# Patient Record
Sex: MALE | Race: WHITE | NOT HISPANIC OR LATINO | Employment: OTHER | URBAN - METROPOLITAN AREA
[De-identification: names, ages, dates, MRNs, and addresses within clinical notes are randomized per-mention and may not be internally consistent; named-entity substitution may affect disease eponyms.]

---

## 2017-01-01 ENCOUNTER — ALLSCRIPTS OFFICE VISIT (OUTPATIENT)
Dept: OTHER | Facility: OTHER | Age: 57
End: 2017-01-01

## 2017-01-01 ENCOUNTER — APPOINTMENT (INPATIENT)
Dept: NON INVASIVE DIAGNOSTICS | Facility: HOSPITAL | Age: 57
DRG: 246 | End: 2017-01-01
Payer: MEDICARE

## 2017-01-01 ENCOUNTER — ANESTHESIA EVENT (OUTPATIENT)
Dept: PERIOP | Facility: HOSPITAL | Age: 57
End: 2017-01-01
Payer: MEDICARE

## 2017-01-01 ENCOUNTER — HOSPITAL ENCOUNTER (OUTPATIENT)
Dept: SLEEP CENTER | Facility: CLINIC | Age: 57
Discharge: HOME | End: 2017-06-22
Payer: MEDICARE

## 2017-01-01 ENCOUNTER — HOSPITAL ENCOUNTER (OUTPATIENT)
Dept: NON INVASIVE DIAGNOSTICS | Facility: HOSPITAL | Age: 57
Discharge: HOME | End: 2017-01-20
Payer: MEDICARE

## 2017-01-01 ENCOUNTER — GENERIC CONVERSION - ENCOUNTER (OUTPATIENT)
Dept: OTHER | Facility: OTHER | Age: 57
End: 2017-01-01

## 2017-01-01 ENCOUNTER — APPOINTMENT (OUTPATIENT)
Dept: WOUND CARE | Facility: HOSPITAL | Age: 57
End: 2017-01-01
Payer: MEDICARE

## 2017-01-01 ENCOUNTER — HOSPITAL ENCOUNTER (OUTPATIENT)
Dept: NON INVASIVE DIAGNOSTICS | Facility: HOSPITAL | Age: 57
Discharge: HOME | End: 2017-03-07
Attending: INTERNAL MEDICINE | Admitting: INTERNAL MEDICINE
Payer: MEDICARE

## 2017-01-01 ENCOUNTER — ANESTHESIA (OUTPATIENT)
Dept: PERIOP | Facility: HOSPITAL | Age: 57
End: 2017-01-01
Payer: MEDICARE

## 2017-01-01 ENCOUNTER — HOSPITAL ENCOUNTER (EMERGENCY)
Facility: HOSPITAL | Age: 57
Discharge: HOME | End: 2017-03-15
Attending: EMERGENCY MEDICINE | Admitting: EMERGENCY MEDICINE
Payer: MEDICARE

## 2017-01-01 ENCOUNTER — APPOINTMENT (EMERGENCY)
Dept: RADIOLOGY | Facility: HOSPITAL | Age: 57
DRG: 246 | End: 2017-01-01
Payer: MEDICARE

## 2017-01-01 ENCOUNTER — APPOINTMENT (INPATIENT)
Dept: RADIOLOGY | Facility: HOSPITAL | Age: 57
DRG: 283 | End: 2017-01-01
Attending: INTERNAL MEDICINE
Payer: MEDICARE

## 2017-01-01 ENCOUNTER — APPOINTMENT (INPATIENT)
Dept: DIALYSIS | Facility: HOSPITAL | Age: 57
DRG: 283 | End: 2017-01-01
Attending: INTERNAL MEDICINE
Payer: MEDICARE

## 2017-01-01 ENCOUNTER — HOSPITAL ENCOUNTER (INPATIENT)
Facility: HOSPITAL | Age: 57
LOS: 8 days | DRG: 283 | End: 2017-08-04
Attending: EMERGENCY MEDICINE | Admitting: FAMILY MEDICINE
Payer: MEDICARE

## 2017-01-01 ENCOUNTER — APPOINTMENT (EMERGENCY)
Dept: RADIOLOGY | Facility: HOSPITAL | Age: 57
DRG: 283 | End: 2017-01-01
Payer: MEDICARE

## 2017-01-01 ENCOUNTER — HOSPITAL ENCOUNTER (OUTPATIENT)
Dept: NON INVASIVE DIAGNOSTICS | Facility: CLINIC | Age: 57
Discharge: HOME | End: 2017-02-14
Payer: MEDICARE

## 2017-01-01 ENCOUNTER — APPOINTMENT (INPATIENT)
Dept: RADIOLOGY | Facility: HOSPITAL | Age: 57
DRG: 283 | End: 2017-01-01
Payer: MEDICARE

## 2017-01-01 ENCOUNTER — HOSPITAL ENCOUNTER (OUTPATIENT)
Facility: HOSPITAL | Age: 57
Setting detail: OUTPATIENT SURGERY
Discharge: HOME | End: 2017-01-17
Attending: SPECIALIST | Admitting: SPECIALIST
Payer: MEDICARE

## 2017-01-01 ENCOUNTER — APPOINTMENT (INPATIENT)
Dept: RADIOLOGY | Facility: HOSPITAL | Age: 57
DRG: 246 | End: 2017-01-01
Payer: MEDICARE

## 2017-01-01 ENCOUNTER — APPOINTMENT (OUTPATIENT)
Dept: NON INVASIVE DIAGNOSTICS | Facility: HOSPITAL | Age: 57
End: 2017-01-01
Payer: MEDICARE

## 2017-01-01 ENCOUNTER — APPOINTMENT (INPATIENT)
Dept: RADIOLOGY | Facility: HOSPITAL | Age: 57
DRG: 553 | End: 2017-01-01
Payer: MEDICARE

## 2017-01-01 ENCOUNTER — HOSPITAL ENCOUNTER (OUTPATIENT)
Dept: RADIOLOGY | Facility: HOSPITAL | Age: 57
Discharge: HOME | End: 2017-03-15
Attending: INTERNAL MEDICINE
Payer: MEDICARE

## 2017-01-01 ENCOUNTER — HOSPITAL ENCOUNTER (OUTPATIENT)
Dept: RADIOLOGY | Facility: HOSPITAL | Age: 57
Discharge: HOME | End: 2017-01-20
Payer: MEDICARE

## 2017-01-01 ENCOUNTER — APPOINTMENT (INPATIENT)
Dept: NON INVASIVE DIAGNOSTICS | Facility: HOSPITAL | Age: 57
DRG: 283 | End: 2017-01-01
Payer: MEDICARE

## 2017-01-01 ENCOUNTER — HOSPITAL ENCOUNTER (INPATIENT)
Facility: HOSPITAL | Age: 57
LOS: 3 days | Discharge: HOME | DRG: 553 | End: 2017-05-10
Attending: EMERGENCY MEDICINE | Admitting: HOSPITALIST
Payer: MEDICARE

## 2017-01-01 ENCOUNTER — APPOINTMENT (INPATIENT)
Dept: NON INVASIVE DIAGNOSTICS | Facility: HOSPITAL | Age: 57
DRG: 553 | End: 2017-01-01
Payer: MEDICARE

## 2017-01-01 ENCOUNTER — APPOINTMENT (INPATIENT)
Dept: DIALYSIS | Facility: HOSPITAL | Age: 57
DRG: 283 | End: 2017-01-01
Payer: MEDICARE

## 2017-01-01 ENCOUNTER — HOSPITAL ENCOUNTER (INPATIENT)
Facility: HOSPITAL | Age: 57
LOS: 8 days | Discharge: HOME | DRG: 246 | End: 2017-07-07
Attending: EMERGENCY MEDICINE | Admitting: INTERNAL MEDICINE
Payer: MEDICARE

## 2017-01-01 ENCOUNTER — TRANSCRIBE ORDERS (OUTPATIENT)
Dept: ADMINISTRATIVE | Facility: HOSPITAL | Age: 57
End: 2017-01-01

## 2017-01-01 ENCOUNTER — APPOINTMENT (EMERGENCY)
Dept: RADIOLOGY | Facility: HOSPITAL | Age: 57
DRG: 553 | End: 2017-01-01
Payer: MEDICARE

## 2017-01-01 ENCOUNTER — TELEPHONE (OUTPATIENT)
Dept: INPATIENT UNIT | Facility: HOSPITAL | Age: 57
End: 2017-01-01

## 2017-01-01 VITALS
WEIGHT: 315 LBS | DIASTOLIC BLOOD PRESSURE: 72 MMHG | SYSTOLIC BLOOD PRESSURE: 113 MMHG | HEIGHT: 73 IN | BODY MASS INDEX: 41.75 KG/M2 | OXYGEN SATURATION: 79 % | TEMPERATURE: 97.6 F | RESPIRATION RATE: 8 BRPM

## 2017-01-01 VITALS
TEMPERATURE: 97.3 F | DIASTOLIC BLOOD PRESSURE: 70 MMHG | OXYGEN SATURATION: 98 % | SYSTOLIC BLOOD PRESSURE: 114 MMHG | HEIGHT: 73 IN | BODY MASS INDEX: 41.75 KG/M2 | HEART RATE: 62 BPM | WEIGHT: 315 LBS | RESPIRATION RATE: 18 BRPM

## 2017-01-01 VITALS
HEART RATE: 68 BPM | SYSTOLIC BLOOD PRESSURE: 150 MMHG | OXYGEN SATURATION: 95 % | HEIGHT: 73 IN | TEMPERATURE: 98.5 F | BODY MASS INDEX: 41.75 KG/M2 | RESPIRATION RATE: 18 BRPM | WEIGHT: 315 LBS | DIASTOLIC BLOOD PRESSURE: 69 MMHG

## 2017-01-01 VITALS
HEART RATE: 53 BPM | OXYGEN SATURATION: 100 % | SYSTOLIC BLOOD PRESSURE: 155 MMHG | HEIGHT: 73 IN | WEIGHT: 315 LBS | DIASTOLIC BLOOD PRESSURE: 72 MMHG | TEMPERATURE: 96.1 F | BODY MASS INDEX: 41.75 KG/M2 | RESPIRATION RATE: 16 BRPM

## 2017-01-01 VITALS
SYSTOLIC BLOOD PRESSURE: 136 MMHG | RESPIRATION RATE: 19 BRPM | DIASTOLIC BLOOD PRESSURE: 78 MMHG | TEMPERATURE: 97.9 F | HEIGHT: 73 IN | HEART RATE: 52 BPM | WEIGHT: 315 LBS | OXYGEN SATURATION: 93 % | BODY MASS INDEX: 41.75 KG/M2

## 2017-01-01 VITALS
DIASTOLIC BLOOD PRESSURE: 49 MMHG | OXYGEN SATURATION: 95 % | HEIGHT: 73 IN | HEART RATE: 60 BPM | SYSTOLIC BLOOD PRESSURE: 111 MMHG | RESPIRATION RATE: 18 BRPM | TEMPERATURE: 97.8 F | BODY MASS INDEX: 41.75 KG/M2 | WEIGHT: 315 LBS

## 2017-01-01 DIAGNOSIS — K59.04 CHRONIC IDIOPATHIC CONSTIPATION: ICD-10-CM

## 2017-01-01 DIAGNOSIS — Z99.2 ESRD ON HEMODIALYSIS (HCC): ICD-10-CM

## 2017-01-01 DIAGNOSIS — L97.509 CHRONIC FOOT ULCER (HCC): ICD-10-CM

## 2017-01-01 DIAGNOSIS — Z99.2 ESRD ON DIALYSIS (HCC): ICD-10-CM

## 2017-01-01 DIAGNOSIS — I50.40 COMBINED SYSTOLIC AND DIASTOLIC HEART FAILURE (HCC): Chronic | ICD-10-CM

## 2017-01-01 DIAGNOSIS — K56.600 PARTIAL SMALL BOWEL OBSTRUCTION (HCC): ICD-10-CM

## 2017-01-01 DIAGNOSIS — R42 LIGHTHEADEDNESS: ICD-10-CM

## 2017-01-01 DIAGNOSIS — R55 POSTURAL DIZZINESS WITH NEAR SYNCOPE: ICD-10-CM

## 2017-01-01 DIAGNOSIS — I25.10 CAD (CORONARY ARTERY DISEASE): ICD-10-CM

## 2017-01-01 DIAGNOSIS — N18.6 END-STAGE RENAL DISEASE ON PERITONEAL DIALYSIS (HCC): Chronic | ICD-10-CM

## 2017-01-01 DIAGNOSIS — N18.6 END STAGE RENAL DISEASE (HCC): ICD-10-CM

## 2017-01-01 DIAGNOSIS — R79.82 CRP ELEVATED: ICD-10-CM

## 2017-01-01 DIAGNOSIS — Z95.820 STATUS POST ANGIOPLASTY WITH STENT: ICD-10-CM

## 2017-01-01 DIAGNOSIS — R13.10 DYSPHAGIA: ICD-10-CM

## 2017-01-01 DIAGNOSIS — I42.9 CARDIOMYOPATHY (HCC): ICD-10-CM

## 2017-01-01 DIAGNOSIS — E03.9 HYPOTHYROIDISM: ICD-10-CM

## 2017-01-01 DIAGNOSIS — I77.0 AV FISTULA (HCC): ICD-10-CM

## 2017-01-01 DIAGNOSIS — R55 SYNCOPE: ICD-10-CM

## 2017-01-01 DIAGNOSIS — R20.2 PARESTHESIAS: ICD-10-CM

## 2017-01-01 DIAGNOSIS — N18.6 END STAGE RENAL DISEASE (HCC): Primary | ICD-10-CM

## 2017-01-01 DIAGNOSIS — Z99.2 ESRD ON PERITONEAL DIALYSIS (HCC): Primary | Chronic | ICD-10-CM

## 2017-01-01 DIAGNOSIS — N18.6 ESRD ON DIALYSIS (HCC): ICD-10-CM

## 2017-01-01 DIAGNOSIS — N18.6 ESRD (END STAGE RENAL DISEASE) ON DIALYSIS (HCC): Primary | Chronic | ICD-10-CM

## 2017-01-01 DIAGNOSIS — K85.90 PANCREATITIS: Primary | ICD-10-CM

## 2017-01-01 DIAGNOSIS — R10.13 EPIGASTRIC PAIN: ICD-10-CM

## 2017-01-01 DIAGNOSIS — I95.1 ORTHOSTATIC HYPOTENSION: Primary | ICD-10-CM

## 2017-01-01 DIAGNOSIS — N18.6 ESRD ON PERITONEAL DIALYSIS (HCC): Primary | Chronic | ICD-10-CM

## 2017-01-01 DIAGNOSIS — K59.00 CONSTIPATION, UNSPECIFIED CONSTIPATION TYPE: ICD-10-CM

## 2017-01-01 DIAGNOSIS — E87.6 HYPOKALEMIA: ICD-10-CM

## 2017-01-01 DIAGNOSIS — R77.8 ELEVATED TROPONIN: ICD-10-CM

## 2017-01-01 DIAGNOSIS — R42 POSTURAL DIZZINESS WITH NEAR SYNCOPE: ICD-10-CM

## 2017-01-01 DIAGNOSIS — Z76.82 KIDNEY TRANSPLANT CANDIDATE: ICD-10-CM

## 2017-01-01 DIAGNOSIS — Z99.2 END-STAGE RENAL DISEASE ON PERITONEAL DIALYSIS (HCC): Chronic | ICD-10-CM

## 2017-01-01 DIAGNOSIS — M25.50 POLYARTHRALGIA: Primary | ICD-10-CM

## 2017-01-01 DIAGNOSIS — N18.6 ESRD ON HEMODIALYSIS (HCC): ICD-10-CM

## 2017-01-01 DIAGNOSIS — E87.1 HYPONATREMIA: ICD-10-CM

## 2017-01-01 DIAGNOSIS — I10 HTN (HYPERTENSION): ICD-10-CM

## 2017-01-01 DIAGNOSIS — I50.42 CHRONIC COMBINED SYSTOLIC AND DIASTOLIC HEART FAILURE (HCC): ICD-10-CM

## 2017-01-01 DIAGNOSIS — I25.5 ISCHEMIC CARDIOMYOPATHY: ICD-10-CM

## 2017-01-01 DIAGNOSIS — I95.9 HYPOTENSION: ICD-10-CM

## 2017-01-01 DIAGNOSIS — Z99.2 ESRD (END STAGE RENAL DISEASE) ON DIALYSIS (HCC): Primary | Chronic | ICD-10-CM

## 2017-01-01 DIAGNOSIS — R07.9 CHEST PAIN: ICD-10-CM

## 2017-01-01 DIAGNOSIS — I50.32 CHRONIC DIASTOLIC HEART FAILURE (HCC): ICD-10-CM

## 2017-01-01 DIAGNOSIS — I95.1 ORTHOSTATIC HYPOTENSION: ICD-10-CM

## 2017-01-01 DIAGNOSIS — R70.0 ESR RAISED: ICD-10-CM

## 2017-01-01 DIAGNOSIS — Z99.2 ESRD (END STAGE RENAL DISEASE) ON DIALYSIS (HCC): Chronic | ICD-10-CM

## 2017-01-01 DIAGNOSIS — I21.4 NSTEMI (NON-ST ELEVATED MYOCARDIAL INFARCTION) (HCC): Primary | ICD-10-CM

## 2017-01-01 DIAGNOSIS — I25.10 ATHEROSCLEROTIC HEART DISEASE OF NATIVE CORONARY ARTERY WITHOUT ANGINA PECTORIS: ICD-10-CM

## 2017-01-01 DIAGNOSIS — I77.0 A-V FISTULA (HCC): ICD-10-CM

## 2017-01-01 DIAGNOSIS — N18.6 ESRD (END STAGE RENAL DISEASE) ON DIALYSIS (HCC): Chronic | ICD-10-CM

## 2017-01-01 DIAGNOSIS — R00.1 BRADYCARDIA: ICD-10-CM

## 2017-01-01 DIAGNOSIS — R79.89 ELEVATED TSH: ICD-10-CM

## 2017-01-01 DIAGNOSIS — Z46.59 ENCOUNTER FOR NASOGASTRIC (NG) TUBE PLACEMENT: ICD-10-CM

## 2017-01-01 DIAGNOSIS — L89.609: ICD-10-CM

## 2017-01-01 DIAGNOSIS — L02.415 CUTANEOUS ABSCESS OF RIGHT LOWER EXTREMITY: ICD-10-CM

## 2017-01-01 DIAGNOSIS — K59.09 OTHER CONSTIPATION: ICD-10-CM

## 2017-01-01 LAB
A/G RATIO (HISTORICAL): 1.3 (ref 1.2–2.2)
A/G RATIO (HISTORICAL): 2.7 (ref 1.2–2.2)
ABO GROUP BLD: NORMAL
ALBUMIN SERPL BCP-MCNC: 2.1 G/DL (ref 3.5–5)
ALBUMIN SERPL BCP-MCNC: 2.1 G/DL (ref 3.5–5)
ALBUMIN SERPL BCP-MCNC: 2.2 G/DL (ref 3.5–5)
ALBUMIN SERPL BCP-MCNC: 2.7 G/DL (ref 3.5–5)
ALBUMIN SERPL BCP-MCNC: 2.7 G/DL (ref 3.5–5)
ALBUMIN SERPL BCP-MCNC: 3.3 G/DL (ref 3.5–5)
ALBUMIN SERPL BCP-MCNC: 3.5 G/DL (ref 3.5–5.5)
ALBUMIN SERPL BCP-MCNC: 4.1 G/DL (ref 3.5–5.5)
ALP SERPL-CCNC: 118 IU/L (ref 39–117)
ALP SERPL-CCNC: 127 U/L (ref 46–116)
ALP SERPL-CCNC: 129 U/L (ref 46–116)
ALP SERPL-CCNC: 145 U/L (ref 46–116)
ALP SERPL-CCNC: 157 U/L (ref 46–116)
ALP SERPL-CCNC: 165 IU/L (ref 39–117)
ALP SERPL-CCNC: 173 U/L (ref 46–116)
ALP SERPL-CCNC: 86 U/L (ref 46–116)
ALT SERPL W P-5'-P-CCNC: 10 U/L (ref 12–78)
ALT SERPL W P-5'-P-CCNC: 10 U/L (ref 12–78)
ALT SERPL W P-5'-P-CCNC: 12 U/L (ref 12–78)
ALT SERPL W P-5'-P-CCNC: 13 U/L (ref 12–78)
ALT SERPL W P-5'-P-CCNC: 18 IU/L (ref 0–44)
ALT SERPL W P-5'-P-CCNC: 26 IU/L (ref 0–44)
ALT SERPL W P-5'-P-CCNC: 41 U/L (ref 12–78)
ALT SERPL W P-5'-P-CCNC: 45 U/L (ref 12–78)
AMMONIA PLAS-SCNC: 23 UMOL/L (ref 11–35)
ANION GAP SERPL CALCULATED.3IONS-SCNC: 10 MMOL/L (ref 4–13)
ANION GAP SERPL CALCULATED.3IONS-SCNC: 10 MMOL/L (ref 4–13)
ANION GAP SERPL CALCULATED.3IONS-SCNC: 11 MMOL/L (ref 4–13)
ANION GAP SERPL CALCULATED.3IONS-SCNC: 13 MMOL/L (ref 4–13)
ANION GAP SERPL CALCULATED.3IONS-SCNC: 14 MMOL/L (ref 4–13)
ANION GAP SERPL CALCULATED.3IONS-SCNC: 15 MMOL/L (ref 4–13)
ANION GAP SERPL CALCULATED.3IONS-SCNC: 16 MMOL/L (ref 4–13)
ANION GAP SERPL CALCULATED.3IONS-SCNC: 17 MMOL/L (ref 4–13)
ANION GAP SERPL CALCULATED.3IONS-SCNC: 18 MMOL/L (ref 4–13)
ANION GAP SERPL CALCULATED.3IONS-SCNC: 26 MMOL/L (ref 4–13)
ANION GAP SERPL CALCULATED.3IONS-SCNC: 5 MMOL/L (ref 4–13)
ANION GAP SERPL CALCULATED.3IONS-SCNC: 6 MMOL/L (ref 4–13)
ANION GAP SERPL CALCULATED.3IONS-SCNC: 6 MMOL/L (ref 4–13)
ANION GAP SERPL CALCULATED.3IONS-SCNC: 7 MMOL/L (ref 4–13)
ANION GAP SERPL CALCULATED.3IONS-SCNC: 8 MMOL/L (ref 4–13)
ANION GAP SERPL CALCULATED.3IONS-SCNC: 9 MMOL/L (ref 4–13)
ANION GAP SERPL CALCULATED.3IONS-SCNC: 9 MMOL/L (ref 4–13)
ANISOCYTOSIS BLD QL SMEAR: PRESENT
ANTINUCLEAR ANTIBODIES, IFA (HISTORICAL): NEGATIVE
APTT PPP: 110 SECONDS (ref 23–35)
APTT PPP: 27 SECONDS (ref 23–35)
APTT PPP: 28 SECONDS (ref 23–35)
APTT PPP: 32 SECONDS (ref 23–35)
APTT PPP: 32 SECONDS (ref 23–35)
APTT PPP: 33 SECONDS (ref 23–35)
APTT PPP: 35 SECONDS (ref 23–35)
APTT PPP: 39 SECONDS (ref 23–35)
APTT PPP: 41 SECONDS (ref 23–35)
APTT PPP: 41 SECONDS (ref 23–35)
APTT PPP: 45 SECONDS (ref 23–35)
APTT PPP: 45 SECONDS (ref 23–35)
APTT PPP: 58 SECONDS (ref 23–35)
APTT PPP: 82 SECONDS (ref 23–35)
AST SERPL W P-5'-P-CCNC: 10 IU/L (ref 0–40)
AST SERPL W P-5'-P-CCNC: 108 U/L (ref 5–45)
AST SERPL W P-5'-P-CCNC: 12 U/L (ref 5–45)
AST SERPL W P-5'-P-CCNC: 19 U/L (ref 5–45)
AST SERPL W P-5'-P-CCNC: 21 U/L (ref 5–45)
AST SERPL W P-5'-P-CCNC: 24 U/L (ref 5–45)
AST SERPL W P-5'-P-CCNC: 26 IU/L (ref 0–40)
AST SERPL W P-5'-P-CCNC: 38 U/L (ref 5–45)
ATRIAL RATE: 220 BPM
ATRIAL RATE: 241 BPM
ATRIAL RATE: 38 BPM
ATRIAL RATE: 40 BPM
ATRIAL RATE: 54 BPM
ATRIAL RATE: 54 BPM
ATRIAL RATE: 59 BPM
ATRIAL RATE: 64 BPM
ATRIAL RATE: 71 BPM
ATRIAL RATE: 71 BPM
ATRIAL RATE: 73 BPM
ATRIAL RATE: 76 BPM
B BURGDOR IGG SER IA-ACNC: 0.09
B BURGDOR IGM SER IA-ACNC: 0.19
BACTERIA SPEC ANAEROBE CULT: NORMAL
BACTERIA SPEC BFLD CULT: NO GROWTH
BACTERIA WND AEROBE CULT: NORMAL
BASE EX.OXY STD BLDV CALC-SCNC: 66.8 % (ref 60–80)
BASE EX.OXY STD BLDV CALC-SCNC: 68.1 % (ref 60–80)
BASE EX.OXY STD BLDV CALC-SCNC: 70 % (ref 60–80)
BASE EXCESS BLDA CALC-SCNC: 1 MMOL/L (ref -2–3)
BASE EXCESS BLDA CALC-SCNC: 18 MMOL/L (ref -2–3)
BASE EXCESS BLDV CALC-SCNC: -1.8 MMOL/L
BASE EXCESS BLDV CALC-SCNC: -3.6 MMOL/L
BASE EXCESS BLDV CALC-SCNC: 0.5 MMOL/L
BASOPHILS # BLD AUTO: 0 %
BASOPHILS # BLD AUTO: 0 THOUSANDS/ΜL (ref 0–0.1)
BASOPHILS # BLD AUTO: 0 X10E3/UL (ref 0–0.2)
BASOPHILS # BLD AUTO: 0.03 THOUSANDS/ΜL (ref 0–0.1)
BASOPHILS # BLD AUTO: 0.04 THOUSANDS/ΜL (ref 0–0.1)
BASOPHILS # BLD AUTO: 0.05 THOUSANDS/ΜL (ref 0–0.1)
BASOPHILS # BLD AUTO: 0.05 THOUSANDS/ΜL (ref 0–0.1)
BASOPHILS # BLD AUTO: 0.07 THOUSANDS/ΜL (ref 0–0.1)
BASOPHILS # BLD AUTO: 0.08 THOUSANDS/ΜL (ref 0–0.1)
BASOPHILS # BLD AUTO: 0.1 THOUSANDS/ΜL (ref 0–0.1)
BASOPHILS # BLD AUTO: 0.1 X10E3/UL (ref 0–0.2)
BASOPHILS # BLD AUTO: 1 %
BASOPHILS # BLD MANUAL: 0 THOUSAND/UL (ref 0–0.1)
BASOPHILS # BLD MANUAL: 0 THOUSAND/UL (ref 0–0.1)
BASOPHILS NFR BLD AUTO: 0 % (ref 0–1)
BASOPHILS NFR BLD AUTO: 1 % (ref 0–1)
BASOPHILS NFR BLD AUTO: 2 % (ref 0–1)
BASOPHILS NFR MAR MANUAL: 0 % (ref 0–1)
BASOPHILS NFR MAR MANUAL: 0 % (ref 0–1)
BILIRUB SERPL-MCNC: 0.2 MG/DL (ref 0–1.2)
BILIRUB SERPL-MCNC: 0.3 MG/DL (ref 0–1.2)
BILIRUB SERPL-MCNC: 0.4 MG/DL (ref 0.2–1)
BILIRUB SERPL-MCNC: 0.57 MG/DL (ref 0.2–1)
BILIRUB SERPL-MCNC: 0.57 MG/DL (ref 0.2–1)
BILIRUB SERPL-MCNC: 0.58 MG/DL (ref 0.2–1)
BILIRUB SERPL-MCNC: 0.71 MG/DL (ref 0.2–1)
BILIRUB SERPL-MCNC: 0.88 MG/DL (ref 0.2–1)
BLD GP AB SCN SERPL QL: NEGATIVE
BUN SERPL-MCNC: 100 MG/DL (ref 5–25)
BUN SERPL-MCNC: 101 MG/DL (ref 6–24)
BUN SERPL-MCNC: 103 MG/DL (ref 5–25)
BUN SERPL-MCNC: 112 MG/DL (ref 5–25)
BUN SERPL-MCNC: 115 MG/DL (ref 5–25)
BUN SERPL-MCNC: 116 MG/DL (ref 5–25)
BUN SERPL-MCNC: 20 MG/DL (ref 5–25)
BUN SERPL-MCNC: 21 MG/DL (ref 5–25)
BUN SERPL-MCNC: 25 MG/DL (ref 5–25)
BUN SERPL-MCNC: 26 MG/DL (ref 5–25)
BUN SERPL-MCNC: 27 MG/DL (ref 5–25)
BUN SERPL-MCNC: 27 MG/DL (ref 5–25)
BUN SERPL-MCNC: 31 MG/DL (ref 5–25)
BUN SERPL-MCNC: 32 MG/DL (ref 5–25)
BUN SERPL-MCNC: 33 MG/DL (ref 5–25)
BUN SERPL-MCNC: 33 MG/DL (ref 5–25)
BUN SERPL-MCNC: 35 MG/DL (ref 5–25)
BUN SERPL-MCNC: 36 MG/DL (ref 5–25)
BUN SERPL-MCNC: 37 MG/DL (ref 5–25)
BUN SERPL-MCNC: 41 MG/DL (ref 5–25)
BUN SERPL-MCNC: 42 MG/DL (ref 5–25)
BUN SERPL-MCNC: 46 MG/DL (ref 5–25)
BUN SERPL-MCNC: 52 MG/DL (ref 5–25)
BUN SERPL-MCNC: 57 MG/DL (ref 5–25)
BUN SERPL-MCNC: 61 MG/DL (ref 5–25)
BUN SERPL-MCNC: 70 MG/DL (ref 5–25)
BUN SERPL-MCNC: 70 MG/DL (ref 6–24)
BUN SERPL-MCNC: 77 MG/DL (ref 5–25)
BUN SERPL-MCNC: 79 MG/DL (ref 5–25)
BUN SERPL-MCNC: 80 MG/DL (ref 5–25)
BUN SERPL-MCNC: 81 MG/DL (ref 5–25)
BUN SERPL-MCNC: 82 MG/DL (ref 5–25)
BUN/CREA RATIO (HISTORICAL): 6 (ref 9–20)
BUN/CREA RATIO (HISTORICAL): 8 (ref 9–20)
BURR CELLS BLD QL SMEAR: PRESENT
BURR CELLS BLD QL SMEAR: PRESENT
C DIFF TOX GENS STL QL NAA+PROBE: NORMAL
CA-I BLD-SCNC: 0.82 MMOL/L (ref 1.12–1.32)
CA-I BLD-SCNC: 0.85 MMOL/L (ref 1.12–1.32)
CA-I BLD-SCNC: 0.89 MMOL/L (ref 1.12–1.32)
CA-I BLD-SCNC: 0.91 MMOL/L (ref 1.12–1.32)
CA-I BLD-SCNC: 1.01 MMOL/L (ref 1.12–1.32)
CA-I BLD-SCNC: 1.09 MMOL/L (ref 1.12–1.32)
CA-I BLD-SCNC: 1.1 MMOL/L (ref 1.12–1.32)
CA-I BLD-SCNC: 1.12 MMOL/L (ref 1.12–1.32)
CA-I BLD-SCNC: 1.14 MMOL/L (ref 1.12–1.32)
CA-I BLD-SCNC: 1.15 MMOL/L (ref 1.12–1.32)
CA-I BLD-SCNC: 1.15 MMOL/L (ref 1.12–1.32)
CA-I BLD-SCNC: 1.17 MMOL/L (ref 1.12–1.32)
CA-I BLD-SCNC: 1.18 MMOL/L (ref 1.12–1.32)
CA-I BLD-SCNC: 1.21 MMOL/L (ref 1.12–1.32)
CA-I BLD-SCNC: 1.22 MMOL/L (ref 1.12–1.32)
CA-I BLD-SCNC: 1.54 MMOL/L (ref 1.12–1.32)
CALCIUM SERPL-MCNC: 6.2 MG/DL (ref 8.3–10.1)
CALCIUM SERPL-MCNC: 6.3 MG/DL (ref 8.3–10.1)
CALCIUM SERPL-MCNC: 6.4 MG/DL (ref 8.3–10.1)
CALCIUM SERPL-MCNC: 6.8 MG/DL (ref 8.3–10.1)
CALCIUM SERPL-MCNC: 6.8 MG/DL (ref 8.3–10.1)
CALCIUM SERPL-MCNC: 7 MG/DL (ref 8.3–10.1)
CALCIUM SERPL-MCNC: 7 MG/DL (ref 8.3–10.1)
CALCIUM SERPL-MCNC: 7.1 MG/DL (ref 8.3–10.1)
CALCIUM SERPL-MCNC: 7.2 MG/DL (ref 8.3–10.1)
CALCIUM SERPL-MCNC: 7.2 MG/DL (ref 8.3–10.1)
CALCIUM SERPL-MCNC: 7.4 MG/DL (ref 8.3–10.1)
CALCIUM SERPL-MCNC: 7.4 MG/DL (ref 8.3–10.1)
CALCIUM SERPL-MCNC: 7.6 MG/DL (ref 8.3–10.1)
CALCIUM SERPL-MCNC: 7.7 MG/DL (ref 8.3–10.1)
CALCIUM SERPL-MCNC: 7.7 MG/DL (ref 8.7–10.2)
CALCIUM SERPL-MCNC: 7.8 MG/DL (ref 8.3–10.1)
CALCIUM SERPL-MCNC: 7.8 MG/DL (ref 8.7–10.2)
CALCIUM SERPL-MCNC: 7.9 MG/DL (ref 8.3–10.1)
CALCIUM SERPL-MCNC: 7.9 MG/DL (ref 8.3–10.1)
CALCIUM SERPL-MCNC: 8 MG/DL (ref 8.3–10.1)
CALCIUM SERPL-MCNC: 8 MG/DL (ref 8.3–10.1)
CALCIUM SERPL-MCNC: 8.1 MG/DL (ref 8.3–10.1)
CALCIUM SERPL-MCNC: 8.1 MG/DL (ref 8.3–10.1)
CALCIUM SERPL-MCNC: 8.5 MG/DL (ref 8.3–10.1)
CALCIUM SERPL-MCNC: 8.7 MG/DL (ref 8.3–10.1)
CALCIUM SERPL-MCNC: 8.8 MG/DL (ref 8.3–10.1)
CALCIUM SERPL-MCNC: 8.8 MG/DL (ref 8.3–10.1)
CALCIUM SERPL-MCNC: 8.9 MG/DL (ref 8.3–10.1)
CALCIUM SERPL-MCNC: 9.1 MG/DL (ref 8.3–10.1)
CALCIUM SERPL-MCNC: 9.2 MG/DL (ref 8.3–10.1)
CHEST PAIN STATEMENT: NORMAL
CHLORIDE SERPL-SCNC: 100 MMOL/L (ref 100–108)
CHLORIDE SERPL-SCNC: 102 MMOL/L (ref 100–108)
CHLORIDE SERPL-SCNC: 103 MMOL/L (ref 100–108)
CHLORIDE SERPL-SCNC: 104 MMOL/L (ref 100–108)
CHLORIDE SERPL-SCNC: 105 MMOL/L (ref 100–108)
CHLORIDE SERPL-SCNC: 106 MMOL/L (ref 100–108)
CHLORIDE SERPL-SCNC: 87 MMOL/L (ref 100–108)
CHLORIDE SERPL-SCNC: 89 MMOL/L (ref 100–108)
CHLORIDE SERPL-SCNC: 89 MMOL/L (ref 100–108)
CHLORIDE SERPL-SCNC: 90 MMOL/L (ref 100–108)
CHLORIDE SERPL-SCNC: 90 MMOL/L (ref 96–106)
CHLORIDE SERPL-SCNC: 91 MMOL/L (ref 100–108)
CHLORIDE SERPL-SCNC: 92 MMOL/L (ref 100–108)
CHLORIDE SERPL-SCNC: 92 MMOL/L (ref 100–108)
CHLORIDE SERPL-SCNC: 94 MMOL/L (ref 100–108)
CHLORIDE SERPL-SCNC: 94 MMOL/L (ref 100–108)
CHLORIDE SERPL-SCNC: 94 MMOL/L (ref 96–106)
CHLORIDE SERPL-SCNC: 95 MMOL/L (ref 100–108)
CHLORIDE SERPL-SCNC: 96 MMOL/L (ref 100–108)
CHLORIDE SERPL-SCNC: 97 MMOL/L (ref 100–108)
CHLORIDE SERPL-SCNC: 98 MMOL/L (ref 100–108)
CHOLEST SERPL-MCNC: 61 MG/DL (ref 100–199)
CHOLEST SERPL-MCNC: 81 MG/DL (ref 50–200)
CHOLEST SERPL-MCNC: 96 MG/DL (ref 100–199)
CHOLEST/HDLC SERPL: 1.9 RATIO UNITS (ref 0–5)
CHOLEST/HDLC SERPL: 1.9 RATIO UNITS (ref 0–5)
CK SERPL-CCNC: 132 U/L (ref 39–308)
CK SERPL-CCNC: 74 U/L (ref 39–308)
CO2 SERPL-SCNC: 12 MMOL/L (ref 21–32)
CO2 SERPL-SCNC: 17 MMOL/L (ref 21–32)
CO2 SERPL-SCNC: 21 MMOL/L (ref 18–29)
CO2 SERPL-SCNC: 21 MMOL/L (ref 18–29)
CO2 SERPL-SCNC: 21 MMOL/L (ref 21–32)
CO2 SERPL-SCNC: 22 MMOL/L (ref 21–32)
CO2 SERPL-SCNC: 23 MMOL/L (ref 21–32)
CO2 SERPL-SCNC: 24 MMOL/L (ref 21–32)
CO2 SERPL-SCNC: 25 MMOL/L (ref 21–32)
CO2 SERPL-SCNC: 26 MMOL/L (ref 21–32)
CO2 SERPL-SCNC: 27 MMOL/L (ref 21–32)
CO2 SERPL-SCNC: 28 MMOL/L (ref 21–32)
CORTIS AM PEAK SERPL-MCNC: 17.7 UG/DL (ref 4.2–22.4)
CORTIS SERPL-MCNC: 22.3 UG/DL
CORTIS SERPL-MCNC: 31.5 UG/DL
CREAT SERPL-MCNC: 10 MG/DL (ref 0.6–1.3)
CREAT SERPL-MCNC: 10.9 MG/DL (ref 0.6–1.3)
CREAT SERPL-MCNC: 11.2 MG/DL (ref 0.6–1.3)
CREAT SERPL-MCNC: 11.3 MG/DL (ref 0.6–1.3)
CREAT SERPL-MCNC: 12.39 MG/DL (ref 0.76–1.27)
CREAT SERPL-MCNC: 12.64 MG/DL (ref 0.76–1.27)
CREAT SERPL-MCNC: 13.6 MG/DL (ref 0.6–1.3)
CREAT SERPL-MCNC: 14 MG/DL (ref 0.6–1.3)
CREAT SERPL-MCNC: 14.2 MG/DL (ref 0.6–1.3)
CREAT SERPL-MCNC: 14.3 MG/DL (ref 0.6–1.3)
CREAT SERPL-MCNC: 14.5 MG/DL (ref 0.6–1.3)
CREAT SERPL-MCNC: 14.7 MG/DL (ref 0.6–1.3)
CREAT SERPL-MCNC: 14.7 MG/DL (ref 0.6–1.3)
CREAT SERPL-MCNC: 14.8 MG/DL (ref 0.6–1.3)
CREAT SERPL-MCNC: 15 MG/DL (ref 0.6–1.3)
CREAT SERPL-MCNC: 16.52 MG/DL (ref 0.6–1.3)
CREAT SERPL-MCNC: 3.65 MG/DL (ref 0.6–1.3)
CREAT SERPL-MCNC: 3.8 MG/DL (ref 0.6–1.3)
CREAT SERPL-MCNC: 4.1 MG/DL (ref 0.6–1.3)
CREAT SERPL-MCNC: 4.38 MG/DL (ref 0.6–1.3)
CREAT SERPL-MCNC: 4.6 MG/DL (ref 0.6–1.3)
CREAT SERPL-MCNC: 5.04 MG/DL (ref 0.6–1.3)
CREAT SERPL-MCNC: 5.13 MG/DL (ref 0.6–1.3)
CREAT SERPL-MCNC: 5.24 MG/DL (ref 0.6–1.3)
CREAT SERPL-MCNC: 5.67 MG/DL (ref 0.6–1.3)
CREAT SERPL-MCNC: 6.04 MG/DL (ref 0.6–1.3)
CREAT SERPL-MCNC: 7.15 MG/DL (ref 0.6–1.3)
CREAT SERPL-MCNC: 7.64 MG/DL (ref 0.6–1.3)
CREAT SERPL-MCNC: 8.23 MG/DL (ref 0.6–1.3)
CREAT SERPL-MCNC: 8.77 MG/DL (ref 0.6–1.3)
CREAT SERPL-MCNC: 8.78 MG/DL (ref 0.6–1.3)
CREAT SERPL-MCNC: 9.04 MG/DL (ref 0.6–1.3)
CREAT SERPL-MCNC: 9.46 MG/DL (ref 0.6–1.3)
CREAT SERPL-MCNC: 9.92 MG/DL (ref 0.6–1.3)
CRP SERPL QL: 64.2 MG/L
CRYOGLOB RF SER-ACNC: ABNORMAL [IU]/ML
DEPRECATED RDW RBC AUTO: 15.5 % (ref 12.3–15.4)
DEPRECATED RDW RBC AUTO: 15.6 % (ref 12.3–15.4)
EGFR AFRICAN AMERICAN (HISTORICAL): 5 ML/MIN/1.73
EGFR AFRICAN AMERICAN (HISTORICAL): 5 ML/MIN/1.73
EGFR-AMERICAN CALC (HISTORICAL): 4 ML/MIN/1.73
EGFR-AMERICAN CALC (HISTORICAL): 4 ML/MIN/1.73
EOSINOPHIL # BLD AUTO: 0.04 THOUSAND/ΜL (ref 0–0.61)
EOSINOPHIL # BLD AUTO: 0.1 THOUSAND/ΜL (ref 0–0.61)
EOSINOPHIL # BLD AUTO: 0.1 THOUSAND/ΜL (ref 0–0.61)
EOSINOPHIL # BLD AUTO: 0.12 THOUSAND/ΜL (ref 0–0.61)
EOSINOPHIL # BLD AUTO: 0.12 THOUSAND/ΜL (ref 0–0.61)
EOSINOPHIL # BLD AUTO: 0.14 THOUSAND/ΜL (ref 0–0.61)
EOSINOPHIL # BLD AUTO: 0.19 THOUSAND/ΜL (ref 0–0.61)
EOSINOPHIL # BLD AUTO: 0.2 X10E3/UL (ref 0–0.4)
EOSINOPHIL # BLD AUTO: 0.2 X10E3/UL (ref 0–0.4)
EOSINOPHIL # BLD AUTO: 0.23 THOUSAND/ΜL (ref 0–0.61)
EOSINOPHIL # BLD AUTO: 0.3 THOUSAND/ΜL (ref 0–0.61)
EOSINOPHIL # BLD AUTO: 0.3 THOUSAND/ΜL (ref 0–0.61)
EOSINOPHIL # BLD AUTO: 0.42 THOUSAND/ΜL (ref 0–0.61)
EOSINOPHIL # BLD AUTO: 0.46 THOUSAND/ΜL (ref 0–0.61)
EOSINOPHIL # BLD AUTO: 2 %
EOSINOPHIL # BLD AUTO: 3 %
EOSINOPHIL # BLD MANUAL: 0 THOUSAND/UL (ref 0–0.4)
EOSINOPHIL # BLD MANUAL: 0 THOUSAND/UL (ref 0–0.4)
EOSINOPHIL NFR BLD AUTO: 0 % (ref 0–6)
EOSINOPHIL NFR BLD AUTO: 1 % (ref 0–6)
EOSINOPHIL NFR BLD AUTO: 2 % (ref 0–6)
EOSINOPHIL NFR BLD AUTO: 3 % (ref 0–6)
EOSINOPHIL NFR BLD AUTO: 4 % (ref 0–6)
EOSINOPHIL NFR BLD AUTO: 4 % (ref 0–6)
EOSINOPHIL NFR BLD AUTO: 5 % (ref 0–6)
EOSINOPHIL NFR BLD MANUAL: 0 % (ref 0–6)
EOSINOPHIL NFR BLD MANUAL: 0 % (ref 0–6)
ERYTHROCYTE SEDIMENTATION RATE (HISTORICAL): 22 MM/HR (ref 0–30)
ERYTHROCYTE [DISTWIDTH] IN BLOOD BY AUTOMATED COUNT: 15 % (ref 11.6–15.1)
ERYTHROCYTE [DISTWIDTH] IN BLOOD BY AUTOMATED COUNT: 15 % (ref 11.6–15.1)
ERYTHROCYTE [DISTWIDTH] IN BLOOD BY AUTOMATED COUNT: 15.1 % (ref 11.6–15.1)
ERYTHROCYTE [DISTWIDTH] IN BLOOD BY AUTOMATED COUNT: 15.1 % (ref 11.6–15.1)
ERYTHROCYTE [DISTWIDTH] IN BLOOD BY AUTOMATED COUNT: 15.2 % (ref 11.6–15.1)
ERYTHROCYTE [DISTWIDTH] IN BLOOD BY AUTOMATED COUNT: 15.3 % (ref 11.6–15.1)
ERYTHROCYTE [DISTWIDTH] IN BLOOD BY AUTOMATED COUNT: 15.5 % (ref 11.6–15.1)
ERYTHROCYTE [DISTWIDTH] IN BLOOD BY AUTOMATED COUNT: 15.8 % (ref 11.6–15.1)
ERYTHROCYTE [DISTWIDTH] IN BLOOD BY AUTOMATED COUNT: 15.9 % (ref 11.6–15.1)
ERYTHROCYTE [DISTWIDTH] IN BLOOD BY AUTOMATED COUNT: 16 % (ref 11.6–15.1)
ERYTHROCYTE [DISTWIDTH] IN BLOOD BY AUTOMATED COUNT: 16.1 % (ref 11.6–15.1)
ERYTHROCYTE [DISTWIDTH] IN BLOOD BY AUTOMATED COUNT: 16.1 % (ref 11.6–15.1)
ERYTHROCYTE [DISTWIDTH] IN BLOOD BY AUTOMATED COUNT: 16.2 % (ref 11.6–15.1)
ERYTHROCYTE [DISTWIDTH] IN BLOOD BY AUTOMATED COUNT: 16.7 % (ref 11.6–15.1)
ERYTHROCYTE [DISTWIDTH] IN BLOOD BY AUTOMATED COUNT: 16.8 % (ref 11.6–15.1)
ERYTHROCYTE [DISTWIDTH] IN BLOOD BY AUTOMATED COUNT: 16.9 % (ref 11.6–15.1)
ERYTHROCYTE [DISTWIDTH] IN BLOOD BY AUTOMATED COUNT: 17 % (ref 11.6–15.1)
ERYTHROCYTE [DISTWIDTH] IN BLOOD BY AUTOMATED COUNT: 17.1 % (ref 11.6–15.1)
ERYTHROCYTE [DISTWIDTH] IN BLOOD BY AUTOMATED COUNT: 19.9 % (ref 11.6–15.1)
ERYTHROCYTE [DISTWIDTH] IN BLOOD BY AUTOMATED COUNT: 20 % (ref 11.6–15.1)
ERYTHROCYTE [DISTWIDTH] IN BLOOD BY AUTOMATED COUNT: 21.6 % (ref 11.6–15.1)
ERYTHROCYTE [SEDIMENTATION RATE] IN BLOOD: 66 MM/HOUR (ref 0–10)
EST. AVERAGE GLUCOSE BLD GHB EST-MCNC: 120 MG/DL
EST. AVERAGE GLUCOSE BLD GHB EST-MCNC: 131 MG/DL
GFR SERPL CREATININE-BSD FRML MDRD: 10.5 ML/MIN/1.73SQ M
GFR SERPL CREATININE-BSD FRML MDRD: 11.5 ML/MIN/1.73SQ M
GFR SERPL CREATININE-BSD FRML MDRD: 11.8 ML/MIN/1.73SQ M
GFR SERPL CREATININE-BSD FRML MDRD: 12 ML/MIN/1.73SQ M
GFR SERPL CREATININE-BSD FRML MDRD: 13.3 ML/MIN/1.73SQ M
GFR SERPL CREATININE-BSD FRML MDRD: 14.1 ML/MIN/1.73SQ M
GFR SERPL CREATININE-BSD FRML MDRD: 15.2 ML/MIN/1.73SQ M
GFR SERPL CREATININE-BSD FRML MDRD: 16.6 ML/MIN/1.73SQ M
GFR SERPL CREATININE-BSD FRML MDRD: 17.4 ML/MIN/1.73SQ M
GFR SERPL CREATININE-BSD FRML MDRD: 3 ML/MIN/1.73SQ M
GFR SERPL CREATININE-BSD FRML MDRD: 3.4 ML/MIN/1.73SQ M
GFR SERPL CREATININE-BSD FRML MDRD: 3.5 ML/MIN/1.73SQ M
GFR SERPL CREATININE-BSD FRML MDRD: 3.6 ML/MIN/1.73SQ M
GFR SERPL CREATININE-BSD FRML MDRD: 3.7 ML/MIN/1.73SQ M
GFR SERPL CREATININE-BSD FRML MDRD: 3.8 ML/MIN/1.73SQ M
GFR SERPL CREATININE-BSD FRML MDRD: 4 ML/MIN/1.73SQ M
GFR SERPL CREATININE-BSD FRML MDRD: 5 ML/MIN/1.73SQ M
GFR SERPL CREATININE-BSD FRML MDRD: 5.8 ML/MIN/1.73SQ M
GFR SERPL CREATININE-BSD FRML MDRD: 6 ML/MIN/1.73SQ M
GFR SERPL CREATININE-BSD FRML MDRD: 7 ML/MIN/1.73SQ M
GFR SERPL CREATININE-BSD FRML MDRD: 7.4 ML/MIN/1.73SQ M
GFR SERPL CREATININE-BSD FRML MDRD: 8 ML/MIN/1.73SQ M
GFR SERPL CREATININE-BSD FRML MDRD: 9.7 ML/MIN/1.73SQ M
GLUCOSE SERPL-MCNC: 100 MG/DL (ref 65–140)
GLUCOSE SERPL-MCNC: 101 MG/DL (ref 65–140)
GLUCOSE SERPL-MCNC: 101 MG/DL (ref 65–140)
GLUCOSE SERPL-MCNC: 103 MG/DL (ref 65–140)
GLUCOSE SERPL-MCNC: 105 MG/DL (ref 65–140)
GLUCOSE SERPL-MCNC: 105 MG/DL (ref 65–140)
GLUCOSE SERPL-MCNC: 106 MG/DL (ref 65–140)
GLUCOSE SERPL-MCNC: 107 MG/DL (ref 65–140)
GLUCOSE SERPL-MCNC: 107 MG/DL (ref 65–140)
GLUCOSE SERPL-MCNC: 108 MG/DL (ref 65–140)
GLUCOSE SERPL-MCNC: 109 MG/DL (ref 65–140)
GLUCOSE SERPL-MCNC: 110 MG/DL (ref 65–140)
GLUCOSE SERPL-MCNC: 111 MG/DL (ref 65–140)
GLUCOSE SERPL-MCNC: 112 MG/DL (ref 65–140)
GLUCOSE SERPL-MCNC: 112 MG/DL (ref 65–140)
GLUCOSE SERPL-MCNC: 113 MG/DL (ref 65–140)
GLUCOSE SERPL-MCNC: 114 MG/DL (ref 65–140)
GLUCOSE SERPL-MCNC: 116 MG/DL (ref 65–140)
GLUCOSE SERPL-MCNC: 116 MG/DL (ref 65–140)
GLUCOSE SERPL-MCNC: 117 MG/DL (ref 65–140)
GLUCOSE SERPL-MCNC: 118 MG/DL (ref 65–140)
GLUCOSE SERPL-MCNC: 119 MG/DL (ref 65–140)
GLUCOSE SERPL-MCNC: 120 MG/DL (ref 65–140)
GLUCOSE SERPL-MCNC: 120 MG/DL (ref 65–140)
GLUCOSE SERPL-MCNC: 122 MG/DL (ref 65–140)
GLUCOSE SERPL-MCNC: 125 MG/DL (ref 65–140)
GLUCOSE SERPL-MCNC: 126 MG/DL (ref 65–140)
GLUCOSE SERPL-MCNC: 127 MG/DL (ref 65–140)
GLUCOSE SERPL-MCNC: 128 MG/DL (ref 65–140)
GLUCOSE SERPL-MCNC: 129 MG/DL (ref 65–140)
GLUCOSE SERPL-MCNC: 131 MG/DL (ref 65–140)
GLUCOSE SERPL-MCNC: 131 MG/DL (ref 65–99)
GLUCOSE SERPL-MCNC: 133 MG/DL (ref 65–140)
GLUCOSE SERPL-MCNC: 133 MG/DL (ref 65–140)
GLUCOSE SERPL-MCNC: 136 MG/DL (ref 65–140)
GLUCOSE SERPL-MCNC: 137 MG/DL (ref 65–140)
GLUCOSE SERPL-MCNC: 137 MG/DL (ref 65–140)
GLUCOSE SERPL-MCNC: 141 MG/DL (ref 65–140)
GLUCOSE SERPL-MCNC: 142 MG/DL (ref 65–140)
GLUCOSE SERPL-MCNC: 143 MG/DL (ref 65–140)
GLUCOSE SERPL-MCNC: 143 MG/DL (ref 65–140)
GLUCOSE SERPL-MCNC: 144 MG/DL (ref 65–140)
GLUCOSE SERPL-MCNC: 145 MG/DL (ref 65–140)
GLUCOSE SERPL-MCNC: 149 MG/DL (ref 65–140)
GLUCOSE SERPL-MCNC: 151 MG/DL (ref 65–140)
GLUCOSE SERPL-MCNC: 152 MG/DL (ref 65–140)
GLUCOSE SERPL-MCNC: 154 MG/DL (ref 65–140)
GLUCOSE SERPL-MCNC: 155 MG/DL (ref 65–140)
GLUCOSE SERPL-MCNC: 158 MG/DL (ref 65–140)
GLUCOSE SERPL-MCNC: 158 MG/DL (ref 65–140)
GLUCOSE SERPL-MCNC: 160 MG/DL (ref 65–140)
GLUCOSE SERPL-MCNC: 161 MG/DL (ref 65–140)
GLUCOSE SERPL-MCNC: 162 MG/DL (ref 65–140)
GLUCOSE SERPL-MCNC: 162 MG/DL (ref 65–140)
GLUCOSE SERPL-MCNC: 164 MG/DL (ref 65–140)
GLUCOSE SERPL-MCNC: 167 MG/DL (ref 65–140)
GLUCOSE SERPL-MCNC: 171 MG/DL (ref 65–140)
GLUCOSE SERPL-MCNC: 171 MG/DL (ref 65–140)
GLUCOSE SERPL-MCNC: 174 MG/DL (ref 65–140)
GLUCOSE SERPL-MCNC: 179 MG/DL (ref 65–140)
GLUCOSE SERPL-MCNC: 182 MG/DL (ref 65–140)
GLUCOSE SERPL-MCNC: 192 MG/DL (ref 65–140)
GLUCOSE SERPL-MCNC: 72 MG/DL (ref 65–140)
GLUCOSE SERPL-MCNC: 73 MG/DL (ref 65–140)
GLUCOSE SERPL-MCNC: 77 MG/DL (ref 65–140)
GLUCOSE SERPL-MCNC: 77 MG/DL (ref 65–99)
GLUCOSE SERPL-MCNC: 80 MG/DL (ref 65–140)
GLUCOSE SERPL-MCNC: 81 MG/DL (ref 65–140)
GLUCOSE SERPL-MCNC: 82 MG/DL (ref 65–140)
GLUCOSE SERPL-MCNC: 84 MG/DL (ref 65–140)
GLUCOSE SERPL-MCNC: 84 MG/DL (ref 65–140)
GLUCOSE SERPL-MCNC: 85 MG/DL (ref 65–140)
GLUCOSE SERPL-MCNC: 85 MG/DL (ref 65–140)
GLUCOSE SERPL-MCNC: 87 MG/DL (ref 65–140)
GLUCOSE SERPL-MCNC: 88 MG/DL (ref 65–140)
GLUCOSE SERPL-MCNC: 88 MG/DL (ref 65–140)
GLUCOSE SERPL-MCNC: 89 MG/DL (ref 65–140)
GLUCOSE SERPL-MCNC: 89 MG/DL (ref 65–140)
GLUCOSE SERPL-MCNC: 90 MG/DL (ref 65–140)
GLUCOSE SERPL-MCNC: 92 MG/DL (ref 65–140)
GLUCOSE SERPL-MCNC: 94 MG/DL (ref 65–140)
GLUCOSE SERPL-MCNC: 95 MG/DL (ref 65–140)
GLUCOSE SERPL-MCNC: 96 MG/DL (ref 65–140)
GLUCOSE SERPL-MCNC: 97 MG/DL (ref 65–140)
GLUCOSE SERPL-MCNC: 99 MG/DL (ref 65–140)
GRAM STN SPEC: NORMAL
HBA1C MFR BLD HPLC: 6 % (ref 4.8–5.6)
HBA1C MFR BLD HPLC: 6.1 % (ref 4.8–5.6)
HBA1C MFR BLD: 5.8 % (ref 4.2–6.3)
HBA1C MFR BLD: 6.2 % (ref 4.2–6.3)
HCO3 BLDA-SCNC: 28.7 MMOL/L (ref 24–30)
HCO3 BLDA-SCNC: 39.6 MMOL/L (ref 22–28)
HCO3 BLDV-SCNC: 22 MMOL/L (ref 24–30)
HCO3 BLDV-SCNC: 24.4 MMOL/L (ref 24–30)
HCO3 BLDV-SCNC: 27.2 MMOL/L (ref 24–30)
HCT VFR BLD AUTO: 29.5 % (ref 36.5–49.3)
HCT VFR BLD AUTO: 30 % (ref 36.5–49.3)
HCT VFR BLD AUTO: 30.3 % (ref 36.5–49.3)
HCT VFR BLD AUTO: 31.1 % (ref 36.5–49.3)
HCT VFR BLD AUTO: 31.3 % (ref 36.5–49.3)
HCT VFR BLD AUTO: 31.3 % (ref 36.5–49.3)
HCT VFR BLD AUTO: 31.9 % (ref 37.5–51)
HCT VFR BLD AUTO: 35 % (ref 36.5–49.3)
HCT VFR BLD AUTO: 35.1 % (ref 36.5–49.3)
HCT VFR BLD AUTO: 35.2 % (ref 36.5–49.3)
HCT VFR BLD AUTO: 35.3 % (ref 36.5–49.3)
HCT VFR BLD AUTO: 35.8 % (ref 36.5–49.3)
HCT VFR BLD AUTO: 36.5 % (ref 36.5–49.3)
HCT VFR BLD AUTO: 36.8 % (ref 37.5–51)
HCT VFR BLD AUTO: 37.1 % (ref 36.5–49.3)
HCT VFR BLD AUTO: 37.1 % (ref 36.5–49.3)
HCT VFR BLD AUTO: 37.2 % (ref 36.5–49.3)
HCT VFR BLD AUTO: 37.3 % (ref 36.5–49.3)
HCT VFR BLD AUTO: 37.6 % (ref 36.5–49.3)
HCT VFR BLD AUTO: 37.9 % (ref 42–52)
HCT VFR BLD AUTO: 38.2 % (ref 36.5–49.3)
HCT VFR BLD AUTO: 38.9 % (ref 36.5–49.3)
HCT VFR BLD AUTO: 39.2 % (ref 36.5–49.3)
HCT VFR BLD AUTO: 40.5 % (ref 42–52)
HCT VFR BLD AUTO: 40.6 % (ref 36.5–49.3)
HCT VFR BLD CALC: 33 % (ref 36.5–49.3)
HCT VFR BLD CALC: 34 % (ref 36.5–49.3)
HDLC SERPL-MCNC: 32 MG/DL
HDLC SERPL-MCNC: 42 MG/DL (ref 40–60)
HDLC SERPL-MCNC: 50 MG/DL
HGB BLD-MCNC: 10.3 G/DL (ref 12.6–17.7)
HGB BLD-MCNC: 10.9 G/DL (ref 12–17)
HGB BLD-MCNC: 11 G/DL (ref 12–17)
HGB BLD-MCNC: 11 G/DL (ref 12–17)
HGB BLD-MCNC: 11.1 G/DL (ref 12–17)
HGB BLD-MCNC: 11.4 G/DL (ref 12–17)
HGB BLD-MCNC: 11.5 G/DL (ref 12–17)
HGB BLD-MCNC: 11.6 G/DL (ref 12–17)
HGB BLD-MCNC: 11.9 G/DL (ref 12–17)
HGB BLD-MCNC: 12.2 G/DL (ref 12–17)
HGB BLD-MCNC: 12.3 G/DL (ref 12–17)
HGB BLD-MCNC: 12.4 G/DL (ref 12–17)
HGB BLD-MCNC: 12.5 G/DL (ref 14–18)
HGB BLD-MCNC: 12.6 G/DL (ref 12.6–17.7)
HGB BLD-MCNC: 12.6 G/DL (ref 14–18)
HGB BLD-MCNC: 9.1 G/DL (ref 12–17)
HGB BLD-MCNC: 9.1 G/DL (ref 12–17)
HGB BLD-MCNC: 9.3 G/DL (ref 12–17)
HGB BLD-MCNC: 9.8 G/DL (ref 12–17)
HGB BLD-MCNC: 9.9 G/DL (ref 12–17)
HGB BLD-MCNC: 9.9 G/DL (ref 12–17)
HGB BLDA-MCNC: 11.2 G/DL (ref 12–17)
HGB BLDA-MCNC: 11.6 G/DL (ref 12–17)
IMM.GRANULOCYTES (CD4/8) (HISTORICAL): 0 X10E3/UL (ref 0–0.1)
IMM.GRANULOCYTES (CD4/8) (HISTORICAL): 0.1 X10E3/UL (ref 0–0.1)
IMM.GRANULOCYTES (CD4/8) (HISTORICAL): 1 %
IMM.GRANULOCYTES (CD4/8) (HISTORICAL): 1 %
INR PPP: 1.12 (ref 0.86–1.16)
INR PPP: 1.13 (ref 0.86–1.16)
INR PPP: 1.13 (ref 0.86–1.16)
INR PPP: 1.16 (ref 0.86–1.16)
INR PPP: 1.19 (ref 0.86–1.16)
INTERPRETATION (HISTORICAL): NORMAL
KCT BLD-ACNC: 207 SEC (ref 89–137)
KCT BLD-ACNC: 258 SEC (ref 89–137)
LACTATE SERPL-SCNC: 1.2 MMOL/L (ref 0.5–2)
LACTATE SERPL-SCNC: 1.4 MMOL/L (ref 0.5–2)
LACTATE SERPL-SCNC: 1.7 MMOL/L (ref 0.5–2)
LACTATE SERPL-SCNC: 1.8 MMOL/L (ref 0.5–2)
LACTATE SERPL-SCNC: 2.3 MMOL/L (ref 0.5–2)
LDLC SERPL CALC-MCNC: 13 MG/DL (ref 0–99)
LDLC SERPL CALC-MCNC: 22 MG/DL (ref 0–100)
LDLC SERPL CALC-MCNC: 35 MG/DL (ref 0–99)
LIPASE SERPL-CCNC: 237 U/L (ref 73–393)
LIPASE SERPL-CCNC: 85 U/L (ref 73–393)
LYME IGG/IGM AB (HISTORICAL): <0.91 ISR (ref 0–0.9)
LYME IGM (HISTORICAL): <0.8 INDEX (ref 0–0.79)
LYMPHOCYTES # BLD AUTO: 0.19 THOUSAND/UL (ref 0.6–4.47)
LYMPHOCYTES # BLD AUTO: 0.54 THOUSANDS/ΜL (ref 0.6–4.47)
LYMPHOCYTES # BLD AUTO: 0.57 THOUSANDS/ΜL (ref 0.6–4.47)
LYMPHOCYTES # BLD AUTO: 0.59 THOUSANDS/ΜL (ref 0.6–4.47)
LYMPHOCYTES # BLD AUTO: 0.63 THOUSANDS/ΜL (ref 0.6–4.47)
LYMPHOCYTES # BLD AUTO: 0.65 THOUSANDS/ΜL (ref 0.6–4.47)
LYMPHOCYTES # BLD AUTO: 0.69 THOUSANDS/ΜL (ref 0.6–4.47)
LYMPHOCYTES # BLD AUTO: 0.7 X10E3/UL (ref 0.7–3.1)
LYMPHOCYTES # BLD AUTO: 0.77 THOUSANDS/ΜL (ref 0.6–4.47)
LYMPHOCYTES # BLD AUTO: 0.8 THOUSANDS/ΜL (ref 0.6–4.47)
LYMPHOCYTES # BLD AUTO: 0.81 THOUSANDS/ΜL (ref 0.6–4.47)
LYMPHOCYTES # BLD AUTO: 0.9 X10E3/UL (ref 0.7–3.1)
LYMPHOCYTES # BLD AUTO: 1 THOUSANDS/ΜL (ref 0.6–4.47)
LYMPHOCYTES # BLD AUTO: 1.05 THOUSAND/UL (ref 0.6–4.47)
LYMPHOCYTES # BLD AUTO: 1.22 THOUSANDS/ΜL (ref 0.6–4.47)
LYMPHOCYTES # BLD AUTO: 1.27 THOUSANDS/ΜL (ref 0.6–4.47)
LYMPHOCYTES # BLD AUTO: 1.32 THOUSANDS/ΜL (ref 0.6–4.47)
LYMPHOCYTES # BLD AUTO: 1.68 THOUSANDS/ΜL (ref 0.6–4.47)
LYMPHOCYTES # BLD AUTO: 10 %
LYMPHOCYTES # BLD AUTO: 2 % (ref 14–44)
LYMPHOCYTES # BLD AUTO: 4 % (ref 14–44)
LYMPHOCYTES # BLD AUTO: 8 %
LYMPHOCYTES NFR BLD AUTO: 10 % (ref 14–44)
LYMPHOCYTES NFR BLD AUTO: 11 % (ref 14–44)
LYMPHOCYTES NFR BLD AUTO: 11 % (ref 14–44)
LYMPHOCYTES NFR BLD AUTO: 12 % (ref 14–44)
LYMPHOCYTES NFR BLD AUTO: 13 % (ref 14–44)
LYMPHOCYTES NFR BLD AUTO: 13 % (ref 14–44)
LYMPHOCYTES NFR BLD AUTO: 14 % (ref 14–44)
LYMPHOCYTES NFR BLD AUTO: 14 % (ref 14–44)
LYMPHOCYTES NFR BLD AUTO: 3 % (ref 14–44)
LYMPHOCYTES NFR BLD AUTO: 38 %
LYMPHOCYTES NFR BLD AUTO: 4 % (ref 14–44)
LYMPHOCYTES NFR BLD AUTO: 5 % (ref 14–44)
LYMPHOCYTES NFR BLD AUTO: 7 % (ref 14–44)
MACROCYTES BLD QL AUTO: PRESENT
MAGNESIUM SERPL-MCNC: 1.8 MG/DL (ref 1.6–2.6)
MAGNESIUM SERPL-MCNC: 1.8 MG/DL (ref 1.6–2.6)
MAGNESIUM SERPL-MCNC: 1.9 MG/DL (ref 1.6–2.6)
MAGNESIUM SERPL-MCNC: 1.9 MG/DL (ref 1.6–2.6)
MAGNESIUM SERPL-MCNC: 2 MG/DL (ref 1.6–2.6)
MAGNESIUM SERPL-MCNC: 2.1 MG/DL (ref 1.6–2.6)
MAGNESIUM SERPL-MCNC: 2.2 MG/DL (ref 1.6–2.6)
MAGNESIUM SERPL-MCNC: 2.3 MG/DL (ref 1.6–2.6)
MAGNESIUM SERPL-MCNC: 2.3 MG/DL (ref 1.6–2.6)
MAX DIASTOLIC BP: 72 MMHG
MAX HEART RATE: 93 BPM
MAX PREDICTED HEART RATE: 166 BPM
MAX. SYSTOLIC BP: 122 MMHG
MCH RBC QN AUTO: 28 PG (ref 27–31)
MCH RBC QN AUTO: 31 PG (ref 26.8–34.3)
MCH RBC QN AUTO: 31 PG (ref 26.8–34.3)
MCH RBC QN AUTO: 31.1 PG (ref 26.8–34.3)
MCH RBC QN AUTO: 31.1 PG (ref 26.8–34.3)
MCH RBC QN AUTO: 31.2 PG (ref 26.8–34.3)
MCH RBC QN AUTO: 31.5 PG (ref 26.8–34.3)
MCH RBC QN AUTO: 31.7 PG (ref 26.8–34.3)
MCH RBC QN AUTO: 31.8 PG (ref 26.8–34.3)
MCH RBC QN AUTO: 32.7 PG (ref 26.8–34.3)
MCH RBC QN AUTO: 32.8 PG (ref 26.6–33)
MCH RBC QN AUTO: 32.9 PG (ref 26.8–34.3)
MCH RBC QN AUTO: 33 PG (ref 26.8–34.3)
MCH RBC QN AUTO: 33.1 PG (ref 26.8–34.3)
MCH RBC QN AUTO: 33.1 PG (ref 26.8–34.3)
MCH RBC QN AUTO: 33.2 PG (ref 26.8–34.3)
MCH RBC QN AUTO: 33.2 PG (ref 26.8–34.3)
MCH RBC QN AUTO: 33.4 PG (ref 26.8–34.3)
MCH RBC QN AUTO: 33.5 PG (ref 27–31)
MCH RBC QN AUTO: 33.8 PG (ref 26.6–33)
MCH RBC QN AUTO: 33.8 PG (ref 26.8–34.3)
MCH RBC QN AUTO: 33.9 PG (ref 26.8–34.3)
MCHC RBC AUTO-ENTMCNC: 30 G/DL (ref 31.4–37.4)
MCHC RBC AUTO-ENTMCNC: 30.5 G/DL (ref 31.4–37.4)
MCHC RBC AUTO-ENTMCNC: 30.6 G/DL (ref 31.4–37.4)
MCHC RBC AUTO-ENTMCNC: 30.8 G/DL (ref 31.4–37.4)
MCHC RBC AUTO-ENTMCNC: 30.9 G/DL (ref 31.4–37.4)
MCHC RBC AUTO-ENTMCNC: 30.9 G/DL (ref 31.4–37.4)
MCHC RBC AUTO-ENTMCNC: 31 G/DL (ref 31.4–37.4)
MCHC RBC AUTO-ENTMCNC: 31.1 G/DL (ref 31.4–37.4)
MCHC RBC AUTO-ENTMCNC: 31.2 G/DL (ref 31.4–37.4)
MCHC RBC AUTO-ENTMCNC: 31.3 G/DL (ref 31.4–37.4)
MCHC RBC AUTO-ENTMCNC: 31.3 G/DL (ref 31.4–37.4)
MCHC RBC AUTO-ENTMCNC: 31.4 G/DL (ref 31.4–37.4)
MCHC RBC AUTO-ENTMCNC: 31.4 G/DL (ref 31.4–37.4)
MCHC RBC AUTO-ENTMCNC: 31.5 G/DL (ref 31.4–37.4)
MCHC RBC AUTO-ENTMCNC: 31.6 G/DL (ref 31.4–37.4)
MCHC RBC AUTO-ENTMCNC: 31.9 G/DL (ref 31.4–37.4)
MCHC RBC AUTO-ENTMCNC: 32.3 G/DL (ref 31.5–35.7)
MCHC RBC AUTO-ENTMCNC: 32.6 G/DL (ref 31.4–37.4)
MCHC RBC AUTO-ENTMCNC: 32.9 G/DL (ref 31.4–37.4)
MCHC RBC AUTO-ENTMCNC: 33.1 G/DL (ref 31.4–37.4)
MCHC RBC AUTO-ENTMCNC: 34.2 G/DL (ref 31.5–35.7)
MCV RBC AUTO: 100 FL (ref 82–98)
MCV RBC AUTO: 101 FL (ref 82–98)
MCV RBC AUTO: 102 FL (ref 79–97)
MCV RBC AUTO: 104 FL (ref 82–98)
MCV RBC AUTO: 105 FL (ref 82–98)
MCV RBC AUTO: 106 FL (ref 82–98)
MCV RBC AUTO: 107 FL (ref 82–98)
MCV RBC AUTO: 108 FL (ref 82–98)
MCV RBC AUTO: 108 FL (ref 82–98)
MCV RBC AUTO: 109 FL (ref 82–98)
MCV RBC AUTO: 109 FL (ref 82–98)
MCV RBC AUTO: 91 FL (ref 82–98)
MCV RBC AUTO: 96 FL (ref 82–98)
MCV RBC AUTO: 98 FL (ref 82–98)
MCV RBC AUTO: 98 FL (ref 82–98)
MCV RBC AUTO: 99 FL (ref 79–97)
METAMYELOCYTES NFR BLD MANUAL: 2 % (ref 0–1)
MONOCYTES # BLD AUTO: 0 THOUSAND/UL (ref 0–1.22)
MONOCYTES # BLD AUTO: 0.45 THOUSAND/ΜL (ref 0.17–1.22)
MONOCYTES # BLD AUTO: 0.46 THOUSAND/ΜL (ref 0.17–1.22)
MONOCYTES # BLD AUTO: 0.51 THOUSAND/ΜL (ref 0.17–1.22)
MONOCYTES # BLD AUTO: 0.54 THOUSAND/ΜL (ref 0.17–1.22)
MONOCYTES # BLD AUTO: 0.61 THOUSAND/ΜL (ref 0.17–1.22)
MONOCYTES # BLD AUTO: 0.62 THOUSAND/ΜL (ref 0.17–1.22)
MONOCYTES # BLD AUTO: 0.65 THOUSAND/ΜL (ref 0.17–1.22)
MONOCYTES # BLD AUTO: 0.67 THOUSAND/ΜL (ref 0.17–1.22)
MONOCYTES # BLD AUTO: 0.7 THOUSAND/ΜL (ref 0.17–1.22)
MONOCYTES # BLD AUTO: 0.8 THOUSAND/ΜL (ref 0.17–1.22)
MONOCYTES # BLD AUTO: 0.8 X10E3/UL (ref 0.1–0.9)
MONOCYTES # BLD AUTO: 0.87 THOUSAND/ΜL (ref 0.17–1.22)
MONOCYTES # BLD AUTO: 1.1 X10E3/UL (ref 0.1–0.9)
MONOCYTES # BLD AUTO: 1.41 THOUSAND/ΜL (ref 0.17–1.22)
MONOCYTES # BLD AUTO: 1.56 THOUSAND/ΜL (ref 0.17–1.22)
MONOCYTES # BLD AUTO: 1.58 THOUSAND/UL (ref 0–1.22)
MONOCYTES # BLD AUTO: 1.64 THOUSAND/ΜL (ref 0.17–1.22)
MONOCYTES (HISTORICAL): 13 %
MONOCYTES (HISTORICAL): 9 %
MONOCYTES NFR BLD AUTO: 10 % (ref 4–12)
MONOCYTES NFR BLD AUTO: 10 % (ref 4–12)
MONOCYTES NFR BLD AUTO: 11 % (ref 4–12)
MONOCYTES NFR BLD AUTO: 11 % (ref 4–12)
MONOCYTES NFR BLD AUTO: 13 % (ref 4–12)
MONOCYTES NFR BLD AUTO: 14 %
MONOCYTES NFR BLD AUTO: 5 % (ref 4–12)
MONOCYTES NFR BLD AUTO: 5 % (ref 4–12)
MONOCYTES NFR BLD AUTO: 6 % (ref 4–12)
MONOCYTES NFR BLD AUTO: 8 % (ref 4–12)
MONOCYTES NFR BLD AUTO: 9 % (ref 4–12)
MONOCYTES NFR BLD: 0 % (ref 4–12)
MONOCYTES NFR BLD: 6 % (ref 4–12)
NEUTROPHILS # BLD AUTO: 13.97 THOUSANDS/ΜL (ref 1.85–7.62)
NEUTROPHILS # BLD AUTO: 15.64 THOUSANDS/ΜL (ref 1.85–7.62)
NEUTROPHILS # BLD AUTO: 20.66 THOUSANDS/ΜL (ref 1.85–7.62)
NEUTROPHILS # BLD AUTO: 3.46 THOUSANDS/ΜL (ref 1.85–7.62)
NEUTROPHILS # BLD AUTO: 3.64 THOUSANDS/ΜL (ref 1.85–7.62)
NEUTROPHILS # BLD AUTO: 4.63 THOUSANDS/ΜL (ref 1.85–7.62)
NEUTROPHILS # BLD AUTO: 5.03 THOUSANDS/ΜL (ref 1.85–7.62)
NEUTROPHILS # BLD AUTO: 5.5 THOUSANDS/ΜL (ref 1.85–7.62)
NEUTROPHILS # BLD AUTO: 5.67 THOUSANDS/ΜL (ref 1.85–7.62)
NEUTROPHILS # BLD AUTO: 5.7 THOUSANDS/ΜL (ref 1.85–7.62)
NEUTROPHILS # BLD AUTO: 6.6 X10E3/UL (ref 1.4–7)
NEUTROPHILS # BLD AUTO: 6.8 X10E3/UL (ref 1.4–7)
NEUTROPHILS # BLD AUTO: 7.29 THOUSANDS/ΜL (ref 1.85–7.62)
NEUTROPHILS # BLD AUTO: 73 %
NEUTROPHILS # BLD AUTO: 79 %
NEUTROPHILS # BLD AUTO: 8.61 THOUSANDS/ΜL (ref 1.85–7.62)
NEUTROPHILS # BLD AUTO: 8.74 THOUSANDS/ΜL (ref 1.85–7.62)
NEUTROPHILS # BLD AUTO: 9.06 THOUSANDS/ΜL (ref 1.85–7.62)
NEUTROPHILS # BLD MANUAL: 23.16 THOUSAND/UL (ref 1.85–7.62)
NEUTROPHILS # BLD MANUAL: 9.4 THOUSAND/UL (ref 1.85–7.62)
NEUTS SEG NFR BLD AUTO: 48 %
NEUTS SEG NFR BLD AUTO: 70 % (ref 43–75)
NEUTS SEG NFR BLD AUTO: 72 % (ref 43–75)
NEUTS SEG NFR BLD AUTO: 73 % (ref 43–75)
NEUTS SEG NFR BLD AUTO: 74 % (ref 43–75)
NEUTS SEG NFR BLD AUTO: 74 % (ref 43–75)
NEUTS SEG NFR BLD AUTO: 77 % (ref 43–75)
NEUTS SEG NFR BLD AUTO: 77 % (ref 43–75)
NEUTS SEG NFR BLD AUTO: 78 % (ref 43–75)
NEUTS SEG NFR BLD AUTO: 79 % (ref 43–75)
NEUTS SEG NFR BLD AUTO: 80 % (ref 43–75)
NEUTS SEG NFR BLD AUTO: 83 % (ref 43–75)
NEUTS SEG NFR BLD AUTO: 86 % (ref 43–75)
NEUTS SEG NFR BLD AUTO: 86 % (ref 43–75)
NEUTS SEG NFR BLD AUTO: 88 % (ref 43–75)
NEUTS SEG NFR BLD AUTO: 89 % (ref 43–75)
NEUTS SEG NFR BLD AUTO: 97 % (ref 43–75)
NRBC BLD AUTO-RTO: 0 /100 WBCS
NT-PROBNP SERPL-MCNC: ABNORMAL PG/ML
O2 CT BLDV-SCNC: 11 ML/DL
O2 CT BLDV-SCNC: 11.3 ML/DL
O2 CT BLDV-SCNC: 11.7 ML/DL
P AXIS: -85 DEGREES
P AXIS: -89 DEGREES
P AXIS: 0 DEGREES
P AXIS: 0 DEGREES
P AXIS: 264 DEGREES
P AXIS: 40 DEGREES
P AXIS: 53 DEGREES
P AXIS: 59 DEGREES
P AXIS: 61 DEGREES
P AXIS: 64 DEGREES
P AXIS: 65 DEGREES
P AXIS: 68 DEGREES
PCO2 BLD: 31 MMOL/L (ref 21–32)
PCO2 BLD: 39.8 MM HG (ref 36–44)
PCO2 BLD: 41 MMOL/L (ref 21–32)
PCO2 BLD: 72.6 MM HG (ref 42–50)
PCO2 BLDV: 41.9 MM HG (ref 42–50)
PCO2 BLDV: 47.4 MM HG (ref 42–50)
PCO2 BLDV: 52.9 MM HG (ref 42–50)
PDF IMAGE (HISTORICAL): NORMAL
PDF IMAGE (HISTORICAL): NORMAL
PH BLD: 7.21 [PH] (ref 7.3–7.4)
PH BLD: 7.61 [PH] (ref 7.35–7.45)
PH BLDV: 7.33 [PH] (ref 7.3–7.4)
PH BLDV: 7.33 [PH] (ref 7.3–7.4)
PH BLDV: 7.34 [PH] (ref 7.3–7.4)
PHOSPHATE SERPL-MCNC: 2.3 MG/DL (ref 2.7–4.5)
PHOSPHATE SERPL-MCNC: 2.5 MG/DL (ref 2.7–4.5)
PHOSPHATE SERPL-MCNC: 2.6 MG/DL (ref 2.7–4.5)
PHOSPHATE SERPL-MCNC: 2.8 MG/DL (ref 2.7–4.5)
PHOSPHATE SERPL-MCNC: 3 MG/DL (ref 2.7–4.5)
PHOSPHATE SERPL-MCNC: 3.2 MG/DL (ref 2.7–4.5)
PHOSPHATE SERPL-MCNC: 3.3 MG/DL (ref 2.7–4.5)
PHOSPHATE SERPL-MCNC: 3.4 MG/DL (ref 2.7–4.5)
PHOSPHATE SERPL-MCNC: 3.4 MG/DL (ref 2.7–4.5)
PHOSPHATE SERPL-MCNC: 3.6 MG/DL (ref 2.7–4.5)
PHOSPHATE SERPL-MCNC: 3.7 MG/DL (ref 2.7–4.5)
PHOSPHATE SERPL-MCNC: 3.7 MG/DL (ref 2.7–4.5)
PHOSPHATE SERPL-MCNC: 4.6 MG/DL (ref 2.7–4.5)
PHOSPHATE SERPL-MCNC: 4.7 MG/DL (ref 2.7–4.5)
PHOSPHATE SERPL-MCNC: 4.8 MG/DL (ref 2.7–4.5)
PHOSPHATE SERPL-MCNC: 4.9 MG/DL (ref 2.7–4.5)
PHOSPHATE SERPL-MCNC: 5.7 MG/DL (ref 2.7–4.5)
PHOSPHATE SERPL-MCNC: 7.7 MG/DL (ref 2.7–4.5)
PHOSPHATE SERPL-MCNC: 8.5 MG/DL (ref 2.7–4.5)
PHOSPHATE SERPL-MCNC: 8.8 MG/DL (ref 2.7–4.5)
PHOSPHATE SERPL-MCNC: 9 MG/DL (ref 2.7–4.5)
PLATELET # BLD AUTO: 105 THOUSANDS/UL (ref 149–390)
PLATELET # BLD AUTO: 116 THOUSANDS/UL (ref 149–390)
PLATELET # BLD AUTO: 121 THOUSANDS/UL (ref 149–390)
PLATELET # BLD AUTO: 124 THOUSANDS/UL (ref 130–400)
PLATELET # BLD AUTO: 124 THOUSANDS/UL (ref 149–390)
PLATELET # BLD AUTO: 136 THOUSANDS/UL (ref 149–390)
PLATELET # BLD AUTO: 139 THOUSANDS/UL (ref 149–390)
PLATELET # BLD AUTO: 139 THOUSANDS/UL (ref 149–390)
PLATELET # BLD AUTO: 140 THOUSANDS/UL (ref 130–400)
PLATELET # BLD AUTO: 140 THOUSANDS/UL (ref 149–390)
PLATELET # BLD AUTO: 141 THOUSANDS/UL (ref 149–390)
PLATELET # BLD AUTO: 143 THOUSANDS/UL (ref 149–390)
PLATELET # BLD AUTO: 143 THOUSANDS/UL (ref 149–390)
PLATELET # BLD AUTO: 144 THOUSANDS/UL (ref 149–390)
PLATELET # BLD AUTO: 144 THOUSANDS/UL (ref 149–390)
PLATELET # BLD AUTO: 144 X10E3/UL (ref 150–379)
PLATELET # BLD AUTO: 150 THOUSANDS/UL (ref 149–390)
PLATELET # BLD AUTO: 153 X10E3/UL (ref 150–379)
PLATELET # BLD AUTO: 156 THOUSANDS/UL (ref 149–390)
PLATELET # BLD AUTO: 166 THOUSANDS/UL (ref 149–390)
PLATELET # BLD AUTO: 173 THOUSANDS/UL (ref 149–390)
PLATELET # BLD AUTO: 192 THOUSANDS/UL (ref 149–390)
PLATELET # BLD AUTO: 76 THOUSANDS/UL (ref 149–390)
PLATELET # BLD AUTO: 81 THOUSANDS/UL (ref 149–390)
PLATELET # BLD AUTO: 90 THOUSANDS/UL (ref 149–390)
PLATELET # BLD AUTO: 90 THOUSANDS/UL (ref 149–390)
PLATELET BLD QL SMEAR: ABNORMAL
PLATELET BLD QL SMEAR: ADEQUATE
PLATELET BLD QL SMEAR: ADEQUATE
PMV BLD AUTO: 10.4 FL (ref 8.9–12.7)
PMV BLD AUTO: 10.4 FL (ref 8.9–12.7)
PMV BLD AUTO: 10.6 FL (ref 8.9–12.7)
PMV BLD AUTO: 10.7 FL (ref 8.9–12.7)
PMV BLD AUTO: 10.8 FL (ref 8.9–12.7)
PMV BLD AUTO: 10.8 FL (ref 8.9–12.7)
PMV BLD AUTO: 10.9 FL (ref 8.9–12.7)
PMV BLD AUTO: 11.1 FL (ref 8.9–12.7)
PMV BLD AUTO: 11.1 FL (ref 8.9–12.7)
PMV BLD AUTO: 11.2 FL (ref 8.9–12.7)
PMV BLD AUTO: 11.3 FL (ref 8.9–12.7)
PMV BLD AUTO: 11.5 FL (ref 8.9–12.7)
PMV BLD AUTO: 8 FL (ref 8.9–12.7)
PMV BLD AUTO: 8.7 FL (ref 8.9–12.7)
PMV BLD AUTO: 9.2 FL (ref 8.9–12.7)
PMV BLD AUTO: 9.3 FL (ref 8.9–12.7)
PMV BLD AUTO: 9.4 FL (ref 8.9–12.7)
PMV BLD AUTO: 9.5 FL (ref 8.9–12.7)
PMV BLD AUTO: 9.6 FL (ref 8.9–12.7)
PMV BLD AUTO: 9.7 FL (ref 8.9–12.7)
PMV BLD AUTO: 9.8 FL (ref 8.9–12.7)
PMV BLD AUTO: 9.8 FL (ref 8.9–12.7)
PO2 BLD: 117 MM HG (ref 75–129)
PO2 BLD: 78 MM HG (ref 35–45)
PO2 BLDV: 38.4 MM HG (ref 35–45)
PO2 BLDV: 39.8 MM HG (ref 35–45)
PO2 BLDV: 40.1 MM HG (ref 35–45)
POIKILOCYTOSIS BLD QL SMEAR: PRESENT
POLYCHROMASIA BLD QL SMEAR: PRESENT
POLYCHROMASIA BLD QL SMEAR: PRESENT
POTASSIUM BLD-SCNC: 3.6 MMOL/L (ref 3.5–5.3)
POTASSIUM BLD-SCNC: 4.4 MMOL/L (ref 3.5–5.3)
POTASSIUM SERPL-SCNC: 3.2 MMOL/L (ref 3.5–5.3)
POTASSIUM SERPL-SCNC: 3.3 MMOL/L (ref 3.5–5.3)
POTASSIUM SERPL-SCNC: 3.5 MMOL/L (ref 3.5–5.3)
POTASSIUM SERPL-SCNC: 3.7 MMOL/L (ref 3.5–5.3)
POTASSIUM SERPL-SCNC: 3.8 MMOL/L (ref 3.5–5.3)
POTASSIUM SERPL-SCNC: 3.9 MMOL/L (ref 3.5–5.3)
POTASSIUM SERPL-SCNC: 4 MMOL/L (ref 3.5–5.3)
POTASSIUM SERPL-SCNC: 4 MMOL/L (ref 3.5–5.3)
POTASSIUM SERPL-SCNC: 4.3 MMOL/L (ref 3.5–5.3)
POTASSIUM SERPL-SCNC: 4.4 MMOL/L (ref 3.5–5.3)
POTASSIUM SERPL-SCNC: 4.5 MMOL/L (ref 3.5–5.2)
POTASSIUM SERPL-SCNC: 4.5 MMOL/L (ref 3.5–5.3)
POTASSIUM SERPL-SCNC: 4.6 MMOL/L (ref 3.5–5.2)
POTASSIUM SERPL-SCNC: 4.6 MMOL/L (ref 3.5–5.3)
POTASSIUM SERPL-SCNC: 4.7 MMOL/L (ref 3.5–5.3)
POTASSIUM SERPL-SCNC: 4.7 MMOL/L (ref 3.5–5.3)
POTASSIUM SERPL-SCNC: 4.8 MMOL/L (ref 3.5–5.3)
POTASSIUM SERPL-SCNC: 5 MMOL/L (ref 3.5–5.3)
POTASSIUM SERPL-SCNC: 5.3 MMOL/L (ref 3.5–5.3)
POTASSIUM SERPL-SCNC: 5.5 MMOL/L (ref 3.5–5.3)
POTASSIUM SERPL-SCNC: 5.6 MMOL/L (ref 3.5–5.3)
POTASSIUM SERPL-SCNC: 5.6 MMOL/L (ref 3.5–5.3)
POTASSIUM SERPL-SCNC: 5.8 MMOL/L (ref 3.5–5.3)
POTASSIUM SERPL-SCNC: 6 MMOL/L (ref 3.5–5.3)
POTASSIUM SERPL-SCNC: 6 MMOL/L (ref 3.5–5.3)
PR INTERVAL: 138 MS
PR INTERVAL: 150 MS
PR INTERVAL: 170 MS
PR INTERVAL: 171 MS
PR INTERVAL: 175 MS
PR INTERVAL: 176 MS
PR INTERVAL: 176 MS
PR INTERVAL: 184 MS
PR INTERVAL: 190 MS
PR INTERVAL: 194 MS
PR INTERVAL: 230 MS
PROMYELOCYTES NFR BLD MANUAL: 1 % (ref 0–0)
PROT SERPL-MCNC: 4.8 G/DL (ref 6.4–8.2)
PROT SERPL-MCNC: 5.3 G/DL (ref 6.4–8.2)
PROT SERPL-MCNC: 5.4 G/DL (ref 6.4–8.2)
PROT SERPL-MCNC: 6.1 G/DL (ref 6.4–8.2)
PROT SERPL-MCNC: 6.6 G/DL (ref 6.4–8.2)
PROT SERPL-MCNC: 6.8 G/DL (ref 6.4–8.2)
PROTHROMBIN TIME: 14.4 SECONDS (ref 12.1–14.4)
PROTHROMBIN TIME: 14.5 SECONDS (ref 12.1–14.4)
PROTHROMBIN TIME: 14.6 SECONDS (ref 12–14.3)
PROTHROMBIN TIME: 14.9 SECONDS (ref 12.1–14.4)
PROTHROMBIN TIME: 15.2 SECONDS (ref 12.1–14.4)
PROTOCOL NAME: NORMAL
QRS AXIS: -3 DEGREES
QRS AXIS: -37 DEGREES
QRS AXIS: -4 DEGREES
QRS AXIS: -8 DEGREES
QRS AXIS: 0 DEGREES
QRS AXIS: 103 DEGREES
QRS AXIS: 123 DEGREES
QRS AXIS: 204 DEGREES
QRS AXIS: 260 DEGREES
QRS AXIS: 55 DEGREES
QRS AXIS: 70 DEGREES
QRS AXIS: 84 DEGREES
QRS AXIS: 89 DEGREES
QRS AXIS: 94 DEGREES
QRSD INTERVAL: 100 MS
QRSD INTERVAL: 102 MS
QRSD INTERVAL: 104 MS
QRSD INTERVAL: 110 MS
QRSD INTERVAL: 114 MS
QRSD INTERVAL: 114 MS
QRSD INTERVAL: 120 MS
QRSD INTERVAL: 130 MS
QRSD INTERVAL: 84 MS
QRSD INTERVAL: 96 MS
QRSD INTERVAL: 96 MS
QRSD INTERVAL: 98 MS
QT INTERVAL: 400 MS
QT INTERVAL: 408 MS
QT INTERVAL: 410 MS
QT INTERVAL: 422 MS
QT INTERVAL: 425 MS
QT INTERVAL: 448 MS
QT INTERVAL: 448 MS
QT INTERVAL: 452 MS
QT INTERVAL: 508 MS
QT INTERVAL: 554 MS
QT INTERVAL: 578 MS
QT INTERVAL: 580 MS
QT INTERVAL: 582 MS
QT INTERVAL: 584 MS
QTC INTERVAL: 404 MS
QTC INTERVAL: 416 MS
QTC INTERVAL: 438 MS
QTC INTERVAL: 450 MS
QTC INTERVAL: 458 MS
QTC INTERVAL: 458 MS
QTC INTERVAL: 462 MS
QTC INTERVAL: 471 MS
QTC INTERVAL: 472 MS
QTC INTERVAL: 475 MS
QTC INTERVAL: 481 MS
QTC INTERVAL: 486 MS
QTC INTERVAL: 493 MS
QTC INTERVAL: 525 MS
RA LATEX TURBID (HISTORICAL): <10 IU/ML (ref 0–13.9)
RBC # BLD AUTO: 2.77 MILLION/UL (ref 3.88–5.62)
RBC # BLD AUTO: 2.78 MILLION/UL (ref 3.88–5.62)
RBC # BLD AUTO: 2.8 MILLION/UL (ref 3.88–5.62)
RBC # BLD AUTO: 2.97 MILLION/UL (ref 3.88–5.62)
RBC # BLD AUTO: 2.99 MILLION/UL (ref 3.88–5.62)
RBC # BLD AUTO: 3.01 MILLION/UL (ref 3.88–5.62)
RBC # BLD AUTO: 3.34 MILLION/UL (ref 3.88–5.62)
RBC # BLD AUTO: 3.47 MILLION/UL (ref 3.88–5.62)
RBC # BLD AUTO: 3.47 MILLION/UL (ref 3.88–5.62)
RBC # BLD AUTO: 3.49 MILLION/UL (ref 3.88–5.62)
RBC # BLD AUTO: 3.52 MILLION/UL (ref 3.88–5.62)
RBC # BLD AUTO: 3.57 MILLION/UL (ref 3.88–5.62)
RBC # BLD AUTO: 3.58 MILLION/UL (ref 3.88–5.62)
RBC # BLD AUTO: 3.6 MILLION/UL (ref 3.88–5.62)
RBC # BLD AUTO: 3.61 MILLION/UL (ref 3.88–5.62)
RBC # BLD AUTO: 3.65 MILLION/UL (ref 3.88–5.62)
RBC # BLD AUTO: 3.68 MILLION/UL (ref 3.88–5.62)
RBC # BLD AUTO: 3.73 MILLION/UL (ref 3.88–5.62)
RBC # BLD AUTO: 3.74 MILLION/UL (ref 3.88–5.62)
RBC # BLD AUTO: 3.75 MILLION/UL (ref 4.7–6.1)
RBC # BLD AUTO: 3.83 MILLION/UL (ref 3.88–5.62)
RBC # BLD AUTO: 3.85 MILLION/UL (ref 3.88–5.62)
RBC # BLD AUTO: 4.46 MILLION/UL (ref 4.7–6.1)
RBC (HISTORICAL): 3.14 X10E6/UL (ref 4.14–5.8)
RBC (HISTORICAL): 3.73 X10E6/UL (ref 4.14–5.8)
RBC MORPH BLD: PRESENT
RBC MORPH BLD: PRESENT
REASON FOR TERMINATION: NORMAL
RH BLD: POSITIVE
RHEUMATOID FACT SER QL LA: POSITIVE
RYE IGE QN: NEGATIVE
SAO2 % BLD FROM PO2: 91 % (ref 95–98)
SAO2 % BLD FROM PO2: 99 % (ref 95–98)
SITE: NORMAL
SODIUM BLD-SCNC: 138 MMOL/L (ref 136–145)
SODIUM BLD-SCNC: 147 MMOL/L (ref 136–145)
SODIUM SERPL-SCNC: 124 MMOL/L (ref 136–145)
SODIUM SERPL-SCNC: 127 MMOL/L (ref 136–145)
SODIUM SERPL-SCNC: 128 MMOL/L (ref 136–145)
SODIUM SERPL-SCNC: 130 MMOL/L (ref 136–145)
SODIUM SERPL-SCNC: 131 MMOL/L (ref 136–145)
SODIUM SERPL-SCNC: 131 MMOL/L (ref 136–145)
SODIUM SERPL-SCNC: 132 MMOL/L (ref 136–145)
SODIUM SERPL-SCNC: 132 MMOL/L (ref 136–145)
SODIUM SERPL-SCNC: 133 MMOL/L (ref 136–145)
SODIUM SERPL-SCNC: 133 MMOL/L (ref 136–145)
SODIUM SERPL-SCNC: 134 MMOL/L (ref 136–145)
SODIUM SERPL-SCNC: 135 MMOL/L (ref 136–145)
SODIUM SERPL-SCNC: 135 MMOL/L (ref 136–145)
SODIUM SERPL-SCNC: 136 MMOL/L (ref 136–145)
SODIUM SERPL-SCNC: 136 MMOL/L (ref 136–145)
SODIUM SERPL-SCNC: 137 MMOL/L (ref 136–145)
SODIUM SERPL-SCNC: 138 MMOL/L (ref 136–145)
SODIUM SERPL-SCNC: 139 MMOL/L (ref 134–144)
SODIUM SERPL-SCNC: 139 MMOL/L (ref 136–145)
SODIUM SERPL-SCNC: 139 MMOL/L (ref 136–145)
SODIUM SERPL-SCNC: 140 MMOL/L (ref 136–145)
SODIUM SERPL-SCNC: 144 MMOL/L (ref 134–144)
SPECIMEN EXPIRATION DATE: NORMAL
SPECIMEN SOURCE: ABNORMAL
SPECIMEN SOURCE: NORMAL
T WAVE AXIS: 206 DEGREES
T WAVE AXIS: 207 DEGREES
T WAVE AXIS: 219 DEGREES
T WAVE AXIS: 224 DEGREES
T WAVE AXIS: 228 DEGREES
T WAVE AXIS: 230 DEGREES
T WAVE AXIS: 233 DEGREES
T WAVE AXIS: 243 DEGREES
T WAVE AXIS: 245 DEGREES
T WAVE AXIS: 246 DEGREES
T WAVE AXIS: 254 DEGREES
T WAVE AXIS: 261 DEGREES
T WAVE AXIS: 46 DEGREES
T WAVE AXIS: 92 DEGREES
T3FREE SERPL-MCNC: 0.5 PG/ML (ref 2.3–4.2)
T4 FREE SERPL-MCNC: 0.79 NG/DL (ref 0.76–1.46)
T4 FREE SERPL-MCNC: 0.94 NG/DL (ref 0.76–1.46)
TARGET HR FORMULA: NORMAL
TEST INDICATION: NORMAL
TIME IN EXERCISE PHASE: 180 S
TOT. GLOBULIN, SERUM (HISTORICAL): 1.5 G/DL (ref 1.5–4.5)
TOT. GLOBULIN, SERUM (HISTORICAL): 2.6 G/DL (ref 1.5–4.5)
TOTAL CELLS COUNTED SPEC: 21
TOTAL PROTEIN (HISTORICAL): 5.6 G/DL (ref 6–8.5)
TOTAL PROTEIN (HISTORICAL): 6.1 G/DL (ref 6–8.5)
TRIGL SERPL-MCNC: 54 MG/DL (ref 0–149)
TRIGL SERPL-MCNC: 80 MG/DL (ref 0–149)
TRIGL SERPL-MCNC: 86 MG/DL
TROPONIN I BLD-MCNC: 0.05 NG/ML (ref 0–0.08)
TROPONIN I BLD-MCNC: 9.21 NG/ML (ref 0–0.08)
TROPONIN I BLD-MCNC: 9.34 NG/ML (ref 0–0.08)
TROPONIN I SERPL-MCNC: 0.04 NG/ML
TROPONIN I SERPL-MCNC: 0.72 NG/ML
TROPONIN I SERPL-MCNC: 0.94 NG/ML
TROPONIN I SERPL-MCNC: 1.04 NG/ML
TROPONIN I SERPL-MCNC: 1.32 NG/ML
TROPONIN I SERPL-MCNC: 11 NG/ML
TROPONIN I SERPL-MCNC: 12 NG/ML
TROPONIN I SERPL-MCNC: 12.5 NG/ML
TSH SERPL DL<=0.05 MIU/L-ACNC: 10.6 UIU/ML (ref 0.36–3.74)
TSH SERPL DL<=0.05 MIU/L-ACNC: 2.97 UIU/ML (ref 0.45–4.5)
TSH SERPL DL<=0.05 MIU/L-ACNC: 5.65 UIU/ML (ref 0.36–3.74)
URATE SERPL-MCNC: 3.6 MG/DL (ref 4.2–8)
URIC ACID (HISTORICAL): 4.7 MG/DL (ref 3.7–8.6)
VENTRICULAR RATE: 38 BPM
VENTRICULAR RATE: 40 BPM
VENTRICULAR RATE: 54 BPM
VENTRICULAR RATE: 54 BPM
VENTRICULAR RATE: 59 BPM
VENTRICULAR RATE: 62 BPM
VENTRICULAR RATE: 62 BPM
VENTRICULAR RATE: 64 BPM
VENTRICULAR RATE: 71 BPM
VENTRICULAR RATE: 71 BPM
VENTRICULAR RATE: 73 BPM
VENTRICULAR RATE: 76 BPM
VLDLC SERPL CALC-MCNC: 11 MG/DL (ref 5–40)
VLDLC SERPL CALC-MCNC: 16 MG/DL (ref 5–40)
WBC # BLD AUTO: 10.56 THOUSAND/UL (ref 4.31–10.16)
WBC # BLD AUTO: 11.06 THOUSAND/UL (ref 4.31–10.16)
WBC # BLD AUTO: 11.16 THOUSAND/UL (ref 4.31–10.16)
WBC # BLD AUTO: 11.7 THOUSAND/UL (ref 4.31–10.16)
WBC # BLD AUTO: 11.9 THOUSAND/UL (ref 4.31–10.16)
WBC # BLD AUTO: 16.28 THOUSAND/UL (ref 4.31–10.16)
WBC # BLD AUTO: 18.14 THOUSAND/UL (ref 4.31–10.16)
WBC # BLD AUTO: 23.49 THOUSAND/UL (ref 4.31–10.16)
WBC # BLD AUTO: 26.32 THOUSAND/UL (ref 4.31–10.16)
WBC # BLD AUTO: 27.25 THOUSAND/UL (ref 4.31–10.16)
WBC # BLD AUTO: 4.94 THOUSAND/UL (ref 4.31–10.16)
WBC # BLD AUTO: 4.99 THOUSAND/UL (ref 4.31–10.16)
WBC # BLD AUTO: 5.33 THOUSAND/UL (ref 4.31–10.16)
WBC # BLD AUTO: 5.92 THOUSAND/UL (ref 4.31–10.16)
WBC # BLD AUTO: 6.85 THOUSAND/UL (ref 4.31–10.16)
WBC # BLD AUTO: 7.18 THOUSAND/UL (ref 4.31–10.16)
WBC # BLD AUTO: 7.5 THOUSAND/UL (ref 4.8–10.8)
WBC # BLD AUTO: 7.7 THOUSAND/UL (ref 4.8–10.8)
WBC # BLD AUTO: 7.84 THOUSAND/UL (ref 4.31–10.16)
WBC # BLD AUTO: 8.5 X10E3/UL (ref 3.4–10.8)
WBC # BLD AUTO: 9 X10E3/UL (ref 3.4–10.8)
WBC # BLD AUTO: 9.23 THOUSAND/UL (ref 4.31–10.16)
WBC # BLD AUTO: 9.45 THOUSAND/UL (ref 4.31–10.16)
WBC # BLD AUTO: 9.65 THOUSAND/UL (ref 4.31–10.16)
WBC # BLD AUTO: 9.69 THOUSAND/UL (ref 4.31–10.16)
WBC # FLD MANUAL: 2 /UL

## 2017-01-01 PROCEDURE — 85730 THROMBOPLASTIN TIME PARTIAL: CPT | Performed by: EMERGENCY MEDICINE

## 2017-01-01 PROCEDURE — 3E1M39Z IRRIGATION OF PERITONEAL CAVITY USING DIALYSATE, PERCUTANEOUS APPROACH: ICD-10-PCS | Performed by: INTERNAL MEDICINE

## 2017-01-01 PROCEDURE — 84100 ASSAY OF PHOSPHORUS: CPT | Performed by: INTERNAL MEDICINE

## 2017-01-01 PROCEDURE — 82948 REAGENT STRIP/BLOOD GLUCOSE: CPT

## 2017-01-01 PROCEDURE — 84481 FREE ASSAY (FT-3): CPT | Performed by: PHYSICIAN ASSISTANT

## 2017-01-01 PROCEDURE — 78452 HT MUSCLE IMAGE SPECT MULT: CPT

## 2017-01-01 PROCEDURE — 93458 L HRT ARTERY/VENTRICLE ANGIO: CPT | Performed by: INTERNAL MEDICINE

## 2017-01-01 PROCEDURE — 90945 DIALYSIS ONE EVALUATION: CPT

## 2017-01-01 PROCEDURE — 36620 INSERTION CATHETER ARTERY: CPT

## 2017-01-01 PROCEDURE — A9502 TC99M TETROFOSMIN: HCPCS

## 2017-01-01 PROCEDURE — C1894 INTRO/SHEATH, NON-LASER: HCPCS | Performed by: INTERNAL MEDICINE

## 2017-01-01 PROCEDURE — 99152 MOD SED SAME PHYS/QHP 5/>YRS: CPT | Performed by: INTERNAL MEDICINE

## 2017-01-01 PROCEDURE — 83735 ASSAY OF MAGNESIUM: CPT | Performed by: HOSPITALIST

## 2017-01-01 PROCEDURE — 36415 COLL VENOUS BLD VENIPUNCTURE: CPT | Performed by: HOSPITALIST

## 2017-01-01 PROCEDURE — 71010 HB CHEST X-RAY 1 VIEW FRONTAL (PORTABLE): CPT

## 2017-01-01 PROCEDURE — G8978 MOBILITY CURRENT STATUS: HCPCS

## 2017-01-01 PROCEDURE — 83036 HEMOGLOBIN GLYCOSYLATED A1C: CPT | Performed by: HOSPITALIST

## 2017-01-01 PROCEDURE — 80048 BASIC METABOLIC PNL TOTAL CA: CPT | Performed by: HOSPITALIST

## 2017-01-01 PROCEDURE — 86850 RBC ANTIBODY SCREEN: CPT

## 2017-01-01 PROCEDURE — 85610 PROTHROMBIN TIME: CPT | Performed by: INTERNAL MEDICINE

## 2017-01-01 PROCEDURE — 96365 THER/PROPH/DIAG IV INF INIT: CPT

## 2017-01-01 PROCEDURE — 85025 COMPLETE CBC W/AUTO DIFF WBC: CPT | Performed by: SPECIALIST

## 2017-01-01 PROCEDURE — 82140 ASSAY OF AMMONIA: CPT | Performed by: FAMILY MEDICINE

## 2017-01-01 PROCEDURE — C1769 GUIDE WIRE: HCPCS | Performed by: INTERNAL MEDICINE

## 2017-01-01 PROCEDURE — C1874 STENT, COATED/COV W/DEL SYS: HCPCS

## 2017-01-01 PROCEDURE — 84439 ASSAY OF FREE THYROXINE: CPT | Performed by: FAMILY MEDICINE

## 2017-01-01 PROCEDURE — 84100 ASSAY OF PHOSPHORUS: CPT | Performed by: FAMILY MEDICINE

## 2017-01-01 PROCEDURE — 93005 ELECTROCARDIOGRAM TRACING: CPT

## 2017-01-01 PROCEDURE — 97166 OT EVAL MOD COMPLEX 45 MIN: CPT

## 2017-01-01 PROCEDURE — 84100 ASSAY OF PHOSPHORUS: CPT | Performed by: NURSE PRACTITIONER

## 2017-01-01 PROCEDURE — 84484 ASSAY OF TROPONIN QUANT: CPT | Performed by: HOSPITALIST

## 2017-01-01 PROCEDURE — 82805 BLOOD GASES W/O2 SATURATION: CPT | Performed by: NURSE PRACTITIONER

## 2017-01-01 PROCEDURE — C1751 CATH, INF, PER/CENT/MIDLINE: HCPCS

## 2017-01-01 PROCEDURE — 83735 ASSAY OF MAGNESIUM: CPT | Performed by: INTERNAL MEDICINE

## 2017-01-01 PROCEDURE — 83605 ASSAY OF LACTIC ACID: CPT | Performed by: NURSE PRACTITIONER

## 2017-01-01 PROCEDURE — G8979 MOBILITY GOAL STATUS: HCPCS

## 2017-01-01 PROCEDURE — 80053 COMPREHEN METABOLIC PANEL: CPT | Performed by: EMERGENCY MEDICINE

## 2017-01-01 PROCEDURE — 02H633Z INSERTION OF INFUSION DEVICE INTO RIGHT ATRIUM, PERCUTANEOUS APPROACH: ICD-10-PCS | Performed by: RADIOLOGY

## 2017-01-01 PROCEDURE — 83735 ASSAY OF MAGNESIUM: CPT | Performed by: PHYSICIAN ASSISTANT

## 2017-01-01 PROCEDURE — 85652 RBC SED RATE AUTOMATED: CPT | Performed by: EMERGENCY MEDICINE

## 2017-01-01 PROCEDURE — 93005 ELECTROCARDIOGRAM TRACING: CPT | Performed by: EMERGENCY MEDICINE

## 2017-01-01 PROCEDURE — B2111ZZ FLUOROSCOPY OF MULTIPLE CORONARY ARTERIES USING LOW OSMOLAR CONTRAST: ICD-10-PCS | Performed by: INTERNAL MEDICINE

## 2017-01-01 PROCEDURE — 80048 BASIC METABOLIC PNL TOTAL CA: CPT | Performed by: INTERNAL MEDICINE

## 2017-01-01 PROCEDURE — 82533 TOTAL CORTISOL: CPT | Performed by: INTERNAL MEDICINE

## 2017-01-01 PROCEDURE — 97116 GAIT TRAINING THERAPY: CPT

## 2017-01-01 PROCEDURE — 05HM33Z INSERTION OF INFUSION DEVICE INTO RIGHT INTERNAL JUGULAR VEIN, PERCUTANEOUS APPROACH: ICD-10-PCS | Performed by: PHYSICIAN ASSISTANT

## 2017-01-01 PROCEDURE — 85730 THROMBOPLASTIN TIME PARTIAL: CPT | Performed by: HOSPITALIST

## 2017-01-01 PROCEDURE — G0365 VESSEL MAPPING HEMO ACCESS: HCPCS

## 2017-01-01 PROCEDURE — 85027 COMPLETE CBC AUTOMATED: CPT | Performed by: FAMILY MEDICINE

## 2017-01-01 PROCEDURE — 4A023N7 MEASUREMENT OF CARDIAC SAMPLING AND PRESSURE, LEFT HEART, PERCUTANEOUS APPROACH: ICD-10-PCS | Performed by: INTERNAL MEDICINE

## 2017-01-01 PROCEDURE — 36415 COLL VENOUS BLD VENIPUNCTURE: CPT | Performed by: EMERGENCY MEDICINE

## 2017-01-01 PROCEDURE — 84484 ASSAY OF TROPONIN QUANT: CPT

## 2017-01-01 PROCEDURE — 85025 COMPLETE CBC W/AUTO DIFF WBC: CPT | Performed by: EMERGENCY MEDICINE

## 2017-01-01 PROCEDURE — 84484 ASSAY OF TROPONIN QUANT: CPT | Performed by: PHYSICIAN ASSISTANT

## 2017-01-01 PROCEDURE — C1725 CATH, TRANSLUMIN NON-LASER: HCPCS | Performed by: INTERNAL MEDICINE

## 2017-01-01 PROCEDURE — 71020 HB CHEST X-RAY 2VW FRONTAL&LATL: CPT

## 2017-01-01 PROCEDURE — 36415 COLL VENOUS BLD VENIPUNCTURE: CPT

## 2017-01-01 PROCEDURE — 84550 ASSAY OF BLOOD/URIC ACID: CPT | Performed by: EMERGENCY MEDICINE

## 2017-01-01 PROCEDURE — 84100 ASSAY OF PHOSPHORUS: CPT | Performed by: PHYSICIAN ASSISTANT

## 2017-01-01 PROCEDURE — 86140 C-REACTIVE PROTEIN: CPT | Performed by: EMERGENCY MEDICINE

## 2017-01-01 PROCEDURE — 83735 ASSAY OF MAGNESIUM: CPT | Performed by: FAMILY MEDICINE

## 2017-01-01 PROCEDURE — 80053 COMPREHEN METABOLIC PANEL: CPT | Performed by: FAMILY MEDICINE

## 2017-01-01 PROCEDURE — 92950 HEART/LUNG RESUSCITATION CPR: CPT

## 2017-01-01 PROCEDURE — 80051 ELECTROLYTE PANEL: CPT | Performed by: SPECIALIST

## 2017-01-01 PROCEDURE — 80048 BASIC METABOLIC PNL TOTAL CA: CPT | Performed by: EMERGENCY MEDICINE

## 2017-01-01 PROCEDURE — 93005 ELECTROCARDIOGRAM TRACING: CPT | Performed by: HOSPITALIST

## 2017-01-01 PROCEDURE — 027135Z DILATION OF CORONARY ARTERY, TWO ARTERIES WITH TWO DRUG-ELUTING INTRALUMINAL DEVICES, PERCUTANEOUS APPROACH: ICD-10-PCS | Performed by: INTERNAL MEDICINE

## 2017-01-01 PROCEDURE — 89051 BODY FLUID CELL COUNT: CPT | Performed by: INTERNAL MEDICINE

## 2017-01-01 PROCEDURE — 93017 CV STRESS TEST TRACING ONLY: CPT

## 2017-01-01 PROCEDURE — 92610 EVALUATE SWALLOWING FUNCTION: CPT

## 2017-01-01 PROCEDURE — 85049 AUTOMATED PLATELET COUNT: CPT | Performed by: HOSPITALIST

## 2017-01-01 PROCEDURE — 85610 PROTHROMBIN TIME: CPT | Performed by: PHYSICIAN ASSISTANT

## 2017-01-01 PROCEDURE — 85027 COMPLETE CBC AUTOMATED: CPT | Performed by: NURSE PRACTITIONER

## 2017-01-01 PROCEDURE — 77001 FLUOROGUIDE FOR VEIN DEVICE: CPT

## 2017-01-01 PROCEDURE — 93005 ELECTROCARDIOGRAM TRACING: CPT | Performed by: PHYSICIAN ASSISTANT

## 2017-01-01 PROCEDURE — C1752 CATH,HEMODIALYSIS,SHORT-TERM: HCPCS

## 2017-01-01 PROCEDURE — 85014 HEMATOCRIT: CPT

## 2017-01-01 PROCEDURE — 87205 SMEAR GRAM STAIN: CPT | Performed by: INTERNAL MEDICINE

## 2017-01-01 PROCEDURE — 82805 BLOOD GASES W/O2 SATURATION: CPT | Performed by: INTERNAL MEDICINE

## 2017-01-01 PROCEDURE — 84295 ASSAY OF SERUM SODIUM: CPT

## 2017-01-01 PROCEDURE — C1887 CATHETER, GUIDING: HCPCS | Performed by: INTERNAL MEDICINE

## 2017-01-01 PROCEDURE — 87205 SMEAR GRAM STAIN: CPT | Performed by: SPECIALIST

## 2017-01-01 PROCEDURE — 84100 ASSAY OF PHOSPHORUS: CPT | Performed by: EMERGENCY MEDICINE

## 2017-01-01 PROCEDURE — 86920 COMPATIBILITY TEST SPIN: CPT

## 2017-01-01 PROCEDURE — 85347 COAGULATION TIME ACTIVATED: CPT

## 2017-01-01 PROCEDURE — 82550 ASSAY OF CK (CPK): CPT | Performed by: PHYSICIAN ASSISTANT

## 2017-01-01 PROCEDURE — 84132 ASSAY OF SERUM POTASSIUM: CPT | Performed by: INTERNAL MEDICINE

## 2017-01-01 PROCEDURE — 74000 HB X-RAY EXAM OF ABDOMEN (SINGLE ANTEROPOSTERIOR VIEW): CPT

## 2017-01-01 PROCEDURE — 83605 ASSAY OF LACTIC ACID: CPT | Performed by: FAMILY MEDICINE

## 2017-01-01 PROCEDURE — 5A12012 PERFORMANCE OF CARDIAC OUTPUT, SINGLE, MANUAL: ICD-10-PCS | Performed by: EMERGENCY MEDICINE

## 2017-01-01 PROCEDURE — G8987 SELF CARE CURRENT STATUS: HCPCS

## 2017-01-01 PROCEDURE — 82947 ASSAY GLUCOSE BLOOD QUANT: CPT

## 2017-01-01 PROCEDURE — 82330 ASSAY OF CALCIUM: CPT | Performed by: INTERNAL MEDICINE

## 2017-01-01 PROCEDURE — C1760 CLOSURE DEV, VASC: HCPCS | Performed by: INTERNAL MEDICINE

## 2017-01-01 PROCEDURE — 99213 OFFICE O/P EST LOW 20 MIN: CPT | Performed by: PODIATRIST

## 2017-01-01 PROCEDURE — 84443 ASSAY THYROID STIM HORMONE: CPT | Performed by: EMERGENCY MEDICINE

## 2017-01-01 PROCEDURE — 82803 BLOOD GASES ANY COMBINATION: CPT

## 2017-01-01 PROCEDURE — 99153 MOD SED SAME PHYS/QHP EA: CPT | Performed by: INTERNAL MEDICINE

## 2017-01-01 PROCEDURE — 99285 EMERGENCY DEPT VISIT HI MDM: CPT

## 2017-01-01 PROCEDURE — 70450 CT HEAD/BRAIN W/O DYE: CPT

## 2017-01-01 PROCEDURE — 87493 C DIFF AMPLIFIED PROBE: CPT | Performed by: NURSE PRACTITIONER

## 2017-01-01 PROCEDURE — 93306 TTE W/DOPPLER COMPLETE: CPT

## 2017-01-01 PROCEDURE — 85610 PROTHROMBIN TIME: CPT | Performed by: EMERGENCY MEDICINE

## 2017-01-01 PROCEDURE — 85610 PROTHROMBIN TIME: CPT | Performed by: FAMILY MEDICINE

## 2017-01-01 PROCEDURE — 84132 ASSAY OF SERUM POTASSIUM: CPT

## 2017-01-01 PROCEDURE — 87040 BLOOD CULTURE FOR BACTERIA: CPT | Performed by: FAMILY MEDICINE

## 2017-01-01 PROCEDURE — 85025 COMPLETE CBC W/AUTO DIFF WBC: CPT | Performed by: PHYSICIAN ASSISTANT

## 2017-01-01 PROCEDURE — 93970 EXTREMITY STUDY: CPT

## 2017-01-01 PROCEDURE — 93308 TTE F-UP OR LMTD: CPT

## 2017-01-01 PROCEDURE — 83605 ASSAY OF LACTIC ACID: CPT | Performed by: INTERNAL MEDICINE

## 2017-01-01 PROCEDURE — 93923 UPR/LXTR ART STDY 3+ LVLS: CPT

## 2017-01-01 PROCEDURE — 85025 COMPLETE CBC W/AUTO DIFF WBC: CPT | Performed by: NURSE PRACTITIONER

## 2017-01-01 PROCEDURE — 86038 ANTINUCLEAR ANTIBODIES: CPT | Performed by: HOSPITALIST

## 2017-01-01 PROCEDURE — C9600 PERC DRUG-EL COR STENT SING: HCPCS | Performed by: INTERNAL MEDICINE

## 2017-01-01 PROCEDURE — 84484 ASSAY OF TROPONIN QUANT: CPT | Performed by: EMERGENCY MEDICINE

## 2017-01-01 PROCEDURE — 02HV33Z INSERTION OF INFUSION DEVICE INTO SUPERIOR VENA CAVA, PERCUTANEOUS APPROACH: ICD-10-PCS | Performed by: ANESTHESIOLOGY

## 2017-01-01 PROCEDURE — C1894 INTRO/SHEATH, NON-LASER: HCPCS

## 2017-01-01 PROCEDURE — 86923 COMPATIBILITY TEST ELECTRIC: CPT

## 2017-01-01 PROCEDURE — 02HV33Z INSERTION OF INFUSION DEVICE INTO SUPERIOR VENA CAVA, PERCUTANEOUS APPROACH: ICD-10-PCS | Performed by: PHYSICIAN ASSISTANT

## 2017-01-01 PROCEDURE — 83735 ASSAY OF MAGNESIUM: CPT | Performed by: EMERGENCY MEDICINE

## 2017-01-01 PROCEDURE — 80048 BASIC METABOLIC PNL TOTAL CA: CPT | Performed by: PHYSICIAN ASSISTANT

## 2017-01-01 PROCEDURE — 87070 CULTURE OTHR SPECIMN AEROBIC: CPT | Performed by: SPECIALIST

## 2017-01-01 PROCEDURE — 85025 COMPLETE CBC W/AUTO DIFF WBC: CPT | Performed by: INTERNAL MEDICINE

## 2017-01-01 PROCEDURE — 85027 COMPLETE CBC AUTOMATED: CPT | Performed by: INTERNAL MEDICINE

## 2017-01-01 PROCEDURE — 97163 PT EVAL HIGH COMPLEX 45 MIN: CPT

## 2017-01-01 PROCEDURE — 5A1D60Z PERFORMANCE OF URINARY FILTRATION, MULTIPLE: ICD-10-PCS | Performed by: INTERNAL MEDICINE

## 2017-01-01 PROCEDURE — 82550 ASSAY OF CK (CPK): CPT | Performed by: NURSE PRACTITIONER

## 2017-01-01 PROCEDURE — 93925 LOWER EXTREMITY STUDY: CPT

## 2017-01-01 PROCEDURE — 85025 COMPLETE CBC W/AUTO DIFF WBC: CPT | Performed by: FAMILY MEDICINE

## 2017-01-01 PROCEDURE — 80048 BASIC METABOLIC PNL TOTAL CA: CPT | Performed by: FAMILY MEDICINE

## 2017-01-01 PROCEDURE — 3E04317 INTRODUCTION OF OTHER THROMBOLYTIC INTO CENTRAL VEIN, PERCUTANEOUS APPROACH: ICD-10-PCS | Performed by: EMERGENCY MEDICINE

## 2017-01-01 PROCEDURE — 87185 SC STD ENZYME DETCJ PER NZM: CPT | Performed by: SPECIALIST

## 2017-01-01 PROCEDURE — 86900 BLOOD TYPING SEROLOGIC ABO: CPT

## 2017-01-01 PROCEDURE — 85027 COMPLETE CBC AUTOMATED: CPT | Performed by: HOSPITALIST

## 2017-01-01 PROCEDURE — 97167 OT EVAL HIGH COMPLEX 60 MIN: CPT

## 2017-01-01 PROCEDURE — 82330 ASSAY OF CALCIUM: CPT

## 2017-01-01 PROCEDURE — 84100 ASSAY OF PHOSPHORUS: CPT | Performed by: HOSPITALIST

## 2017-01-01 PROCEDURE — 85730 THROMBOPLASTIN TIME PARTIAL: CPT | Performed by: INTERNAL MEDICINE

## 2017-01-01 PROCEDURE — 82533 TOTAL CORTISOL: CPT | Performed by: FAMILY MEDICINE

## 2017-01-01 PROCEDURE — 99284 EMERGENCY DEPT VISIT MOD MDM: CPT

## 2017-01-01 PROCEDURE — 83605 ASSAY OF LACTIC ACID: CPT | Performed by: EMERGENCY MEDICINE

## 2017-01-01 PROCEDURE — 87075 CULTR BACTERIA EXCEPT BLOOD: CPT | Performed by: SPECIALIST

## 2017-01-01 PROCEDURE — 76937 US GUIDE VASCULAR ACCESS: CPT

## 2017-01-01 PROCEDURE — 87070 CULTURE OTHR SPECIMN AEROBIC: CPT | Performed by: INTERNAL MEDICINE

## 2017-01-01 PROCEDURE — 86430 RHEUMATOID FACTOR TEST QUAL: CPT | Performed by: HOSPITALIST

## 2017-01-01 PROCEDURE — 93990 DOPPLER FLOW TESTING: CPT

## 2017-01-01 PROCEDURE — G8988 SELF CARE GOAL STATUS: HCPCS

## 2017-01-01 PROCEDURE — 83690 ASSAY OF LIPASE: CPT | Performed by: EMERGENCY MEDICINE

## 2017-01-01 PROCEDURE — 85027 COMPLETE CBC AUTOMATED: CPT | Performed by: PHYSICIAN ASSISTANT

## 2017-01-01 PROCEDURE — 84132 ASSAY OF SERUM POTASSIUM: CPT | Performed by: EMERGENCY MEDICINE

## 2017-01-01 PROCEDURE — 74177 CT ABD & PELVIS W/CONTRAST: CPT

## 2017-01-01 PROCEDURE — 83880 ASSAY OF NATRIURETIC PEPTIDE: CPT | Performed by: EMERGENCY MEDICINE

## 2017-01-01 PROCEDURE — 87077 CULTURE AEROBIC IDENTIFY: CPT | Performed by: SPECIALIST

## 2017-01-01 PROCEDURE — 84443 ASSAY THYROID STIM HORMONE: CPT | Performed by: FAMILY MEDICINE

## 2017-01-01 PROCEDURE — 97112 NEUROMUSCULAR REEDUCATION: CPT

## 2017-01-01 PROCEDURE — 36556 INSERT NON-TUNNEL CV CATH: CPT

## 2017-01-01 PROCEDURE — 80048 BASIC METABOLIC PNL TOTAL CA: CPT | Performed by: NURSE PRACTITIONER

## 2017-01-01 PROCEDURE — 87186 SC STD MICRODIL/AGAR DIL: CPT | Performed by: SPECIALIST

## 2017-01-01 PROCEDURE — 85007 BL SMEAR W/DIFF WBC COUNT: CPT | Performed by: FAMILY MEDICINE

## 2017-01-01 PROCEDURE — 96375 TX/PRO/DX INJ NEW DRUG ADDON: CPT

## 2017-01-01 PROCEDURE — C8929 TTE W OR WO FOL WCON,DOPPLER: HCPCS

## 2017-01-01 PROCEDURE — 86618 LYME DISEASE ANTIBODY: CPT | Performed by: EMERGENCY MEDICINE

## 2017-01-01 PROCEDURE — 97597 DBRDMT OPN WND 1ST 20 CM/<: CPT | Performed by: PODIATRIST

## 2017-01-01 PROCEDURE — 84439 ASSAY OF FREE THYROXINE: CPT | Performed by: HOSPITALIST

## 2017-01-01 PROCEDURE — 96374 THER/PROPH/DIAG INJ IV PUSH: CPT

## 2017-01-01 PROCEDURE — 82330 ASSAY OF CALCIUM: CPT | Performed by: NURSE PRACTITIONER

## 2017-01-01 PROCEDURE — 86431 RHEUMATOID FACTOR QUANT: CPT | Performed by: HOSPITALIST

## 2017-01-01 PROCEDURE — 85007 BL SMEAR W/DIFF WBC COUNT: CPT | Performed by: PHYSICIAN ASSISTANT

## 2017-01-01 PROCEDURE — 85730 THROMBOPLASTIN TIME PARTIAL: CPT | Performed by: PHYSICIAN ASSISTANT

## 2017-01-01 PROCEDURE — 86901 BLOOD TYPING SEROLOGIC RH(D): CPT

## 2017-01-01 PROCEDURE — 74176 CT ABD & PELVIS W/O CONTRAST: CPT

## 2017-01-01 PROCEDURE — 85610 PROTHROMBIN TIME: CPT | Performed by: NURSE PRACTITIONER

## 2017-01-01 PROCEDURE — 83605 ASSAY OF LACTIC ACID: CPT | Performed by: PHYSICIAN ASSISTANT

## 2017-01-01 PROCEDURE — 85730 THROMBOPLASTIN TIME PARTIAL: CPT | Performed by: FAMILY MEDICINE

## 2017-01-01 PROCEDURE — 80061 LIPID PANEL: CPT | Performed by: INTERNAL MEDICINE

## 2017-01-01 PROCEDURE — 93005 ELECTROCARDIOGRAM TRACING: CPT | Performed by: STUDENT IN AN ORGANIZED HEALTH CARE EDUCATION/TRAINING PROGRAM

## 2017-01-01 PROCEDURE — C9113 INJ PANTOPRAZOLE SODIUM, VIA: HCPCS | Performed by: FAMILY MEDICINE

## 2017-01-01 PROCEDURE — P9021 RED BLOOD CELLS UNIT: HCPCS

## 2017-01-01 PROCEDURE — 36569 INSJ PICC 5 YR+ W/O IMAGING: CPT

## 2017-01-01 PROCEDURE — 71010 HB CHEST X-RAY 1 VIEW FRONTAL: CPT

## 2017-01-01 PROCEDURE — 94762 N-INVAS EAR/PLS OXIMTRY CONT: CPT

## 2017-01-01 RX ORDER — NITROGLYCERIN 0.4 MG/1
0.4 TABLET SUBLINGUAL
Qty: 90 TABLET | Refills: 0 | Status: SHIPPED | OUTPATIENT
Start: 2017-01-01 | End: 2017-01-01 | Stop reason: HOSPADM

## 2017-01-01 RX ORDER — ONDANSETRON 2 MG/ML
4 INJECTION INTRAMUSCULAR; INTRAVENOUS EVERY 6 HOURS PRN
Status: DISCONTINUED | OUTPATIENT
Start: 2017-01-01 | End: 2017-01-01 | Stop reason: HOSPADM

## 2017-01-01 RX ORDER — SODIUM CHLORIDE 9 MG/ML
60 INJECTION, SOLUTION INTRAVENOUS CONTINUOUS
Status: DISCONTINUED | OUTPATIENT
Start: 2017-01-01 | End: 2017-01-01

## 2017-01-01 RX ORDER — SENNOSIDES 8.6 MG
1 TABLET ORAL DAILY
Status: DISCONTINUED | OUTPATIENT
Start: 2017-01-01 | End: 2017-01-01 | Stop reason: HOSPADM

## 2017-01-01 RX ORDER — MIDODRINE HYDROCHLORIDE 5 MG/1
10 TABLET ORAL
Status: DISCONTINUED | OUTPATIENT
Start: 2017-01-01 | End: 2017-01-01

## 2017-01-01 RX ORDER — ALBUMIN, HUMAN INJ 5% 5 %
12.5 SOLUTION INTRAVENOUS ONCE
Status: COMPLETED | OUTPATIENT
Start: 2017-01-01 | End: 2017-01-01

## 2017-01-01 RX ORDER — SEVELAMER HYDROCHLORIDE 800 MG/1
800 TABLET, FILM COATED ORAL
Status: DISCONTINUED | OUTPATIENT
Start: 2017-01-01 | End: 2017-01-01 | Stop reason: HOSPADM

## 2017-01-01 RX ORDER — MAGNESIUM SULFATE HEPTAHYDRATE 40 MG/ML
2 INJECTION, SOLUTION INTRAVENOUS ONCE
Status: COMPLETED | OUTPATIENT
Start: 2017-01-01 | End: 2017-01-01

## 2017-01-01 RX ORDER — GABAPENTIN 100 MG/1
100 CAPSULE ORAL 3 TIMES DAILY
Status: DISCONTINUED | OUTPATIENT
Start: 2017-01-01 | End: 2017-01-01 | Stop reason: HOSPADM

## 2017-01-01 RX ORDER — ACETAMINOPHEN 325 MG/1
975 TABLET ORAL EVERY 8 HOURS SCHEDULED
Status: DISCONTINUED | OUTPATIENT
Start: 2017-01-01 | End: 2017-01-01 | Stop reason: HOSPADM

## 2017-01-01 RX ORDER — ASPIRIN 81 MG/1
243 TABLET, CHEWABLE ORAL ONCE
Status: COMPLETED | OUTPATIENT
Start: 2017-01-01 | End: 2017-01-01

## 2017-01-01 RX ORDER — VERAPAMIL HCL 2.5 MG/ML
AMPUL (ML) INTRAVENOUS CODE/TRAUMA/SEDATION MEDICATION
Status: COMPLETED | OUTPATIENT
Start: 2017-01-01 | End: 2017-01-01

## 2017-01-01 RX ORDER — ACETAMINOPHEN 325 MG/1
650 TABLET ORAL EVERY 8 HOURS PRN
Status: DISCONTINUED | OUTPATIENT
Start: 2017-01-01 | End: 2017-01-01 | Stop reason: HOSPADM

## 2017-01-01 RX ORDER — PRAVASTATIN SODIUM 80 MG/1
80 TABLET ORAL
Status: DISCONTINUED | OUTPATIENT
Start: 2017-01-01 | End: 2017-01-01 | Stop reason: HOSPADM

## 2017-01-01 RX ORDER — PANTOPRAZOLE SODIUM 20 MG/1
20 TABLET, DELAYED RELEASE ORAL
Status: DISCONTINUED | OUTPATIENT
Start: 2017-01-01 | End: 2017-01-01 | Stop reason: HOSPADM

## 2017-01-01 RX ORDER — GENTAMICIN SULFATE 1 MG/G
OINTMENT TOPICAL 3 TIMES DAILY
Status: DISCONTINUED | OUTPATIENT
Start: 2017-01-01 | End: 2017-01-01

## 2017-01-01 RX ORDER — VALSARTAN 160 MG/1
160 TABLET ORAL
COMMUNITY
End: 2017-01-01 | Stop reason: HOSPADM

## 2017-01-01 RX ORDER — CLOPIDOGREL BISULFATE 75 MG/1
75 TABLET ORAL DAILY
Status: DISCONTINUED | OUTPATIENT
Start: 2017-01-01 | End: 2017-01-01

## 2017-01-01 RX ORDER — METHYLPREDNISOLONE 4 MG/1
4 TABLET ORAL DAILY
Status: DISCONTINUED | OUTPATIENT
Start: 2017-01-01 | End: 2017-01-01 | Stop reason: HOSPADM

## 2017-01-01 RX ORDER — ATROPINE SULFATE 0.1 MG/ML
INJECTION, SOLUTION ENDOTRACHEAL; INTRAMUSCULAR; INTRAVENOUS; SUBCUTANEOUS CODE/TRAUMA/SEDATION MEDICATION
Status: COMPLETED | OUTPATIENT
Start: 2017-01-01 | End: 2017-01-01

## 2017-01-01 RX ORDER — HYDROMORPHONE HYDROCHLORIDE 2 MG/1
2 TABLET ORAL EVERY 4 HOURS PRN
Qty: 20 TABLET | Refills: 0 | Status: SHIPPED | OUTPATIENT
Start: 2017-01-01 | End: 2017-01-01

## 2017-01-01 RX ORDER — ALBUTEROL SULFATE 90 UG/1
2 AEROSOL, METERED RESPIRATORY (INHALATION) EVERY 6 HOURS PRN
COMMUNITY
End: 2017-01-01 | Stop reason: HOSPADM

## 2017-01-01 RX ORDER — ONDANSETRON 2 MG/ML
4 INJECTION INTRAMUSCULAR; INTRAVENOUS EVERY 8 HOURS PRN
Status: DISCONTINUED | OUTPATIENT
Start: 2017-01-01 | End: 2017-01-01 | Stop reason: HOSPADM

## 2017-01-01 RX ORDER — VALSARTAN 160 MG/1
160 TABLET ORAL
Status: DISCONTINUED | OUTPATIENT
Start: 2017-01-01 | End: 2017-01-01

## 2017-01-01 RX ORDER — CLOPIDOGREL BISULFATE 75 MG/1
75 TABLET ORAL DAILY
Qty: 30 TABLET | Refills: 11 | Status: SHIPPED | OUTPATIENT
Start: 2017-01-01 | End: 2017-01-01 | Stop reason: HOSPADM

## 2017-01-01 RX ORDER — FENTANYL CITRATE 50 UG/ML
INJECTION, SOLUTION INTRAMUSCULAR; INTRAVENOUS CODE/TRAUMA/SEDATION MEDICATION
Status: COMPLETED | OUTPATIENT
Start: 2017-01-01 | End: 2017-01-01

## 2017-01-01 RX ORDER — SEVELAMER CARBONATE 800 MG/1
800 TABLET, FILM COATED ORAL
COMMUNITY
End: 2017-01-01 | Stop reason: HOSPADM

## 2017-01-01 RX ORDER — PANTOPRAZOLE SODIUM 40 MG/1
40 TABLET, DELAYED RELEASE ORAL
Status: DISCONTINUED | OUTPATIENT
Start: 2017-01-01 | End: 2017-01-01 | Stop reason: HOSPADM

## 2017-01-01 RX ORDER — CALCIUM CARBONATE 500(1250)
2 TABLET ORAL 2 TIMES DAILY
Status: DISCONTINUED | OUTPATIENT
Start: 2017-01-01 | End: 2017-01-01

## 2017-01-01 RX ORDER — INSULIN GLARGINE 100 [IU]/ML
12 INJECTION, SOLUTION SUBCUTANEOUS EVERY MORNING
Status: DISCONTINUED | OUTPATIENT
Start: 2017-01-01 | End: 2017-01-01 | Stop reason: HOSPADM

## 2017-01-01 RX ORDER — HEPARIN SODIUM 1000 [USP'U]/ML
4000 INJECTION, SOLUTION INTRAVENOUS; SUBCUTANEOUS ONCE
Status: COMPLETED | OUTPATIENT
Start: 2017-01-01 | End: 2017-01-01

## 2017-01-01 RX ORDER — POTASSIUM CHLORIDE 20 MEQ/1
40 TABLET, EXTENDED RELEASE ORAL ONCE
Status: COMPLETED | OUTPATIENT
Start: 2017-01-01 | End: 2017-01-01

## 2017-01-01 RX ORDER — OXYCODONE HYDROCHLORIDE AND ACETAMINOPHEN 5; 325 MG/1; MG/1
2 TABLET ORAL ONCE
Status: COMPLETED | OUTPATIENT
Start: 2017-01-01 | End: 2017-01-01

## 2017-01-01 RX ORDER — LIDOCAINE HYDROCHLORIDE 10 MG/ML
INJECTION, SOLUTION EPIDURAL; INFILTRATION; INTRACAUDAL; PERINEURAL
Status: COMPLETED
Start: 2017-01-01 | End: 2017-01-01

## 2017-01-01 RX ORDER — POLYETHYLENE GLYCOL 3350 17 G/17G
17 POWDER, FOR SOLUTION ORAL DAILY
Status: DISCONTINUED | OUTPATIENT
Start: 2017-01-01 | End: 2017-01-01

## 2017-01-01 RX ORDER — ACETAMINOPHEN 325 MG/1
650 TABLET ORAL EVERY 6 HOURS PRN
Status: DISCONTINUED | OUTPATIENT
Start: 2017-01-01 | End: 2017-01-01

## 2017-01-01 RX ORDER — ASPIRIN 81 MG/1
81 TABLET, CHEWABLE ORAL DAILY
Status: DISCONTINUED | OUTPATIENT
Start: 2017-01-01 | End: 2017-01-01

## 2017-01-01 RX ORDER — SODIUM CHLORIDE 9 MG/ML
50 INJECTION, SOLUTION INTRAVENOUS CONTINUOUS
Status: DISCONTINUED | OUTPATIENT
Start: 2017-01-01 | End: 2017-01-01

## 2017-01-01 RX ORDER — FEBUXOSTAT 40 MG/1
40 TABLET, FILM COATED ORAL DAILY
Qty: 30 TABLET | Refills: 0 | Status: SHIPPED | OUTPATIENT
Start: 2017-01-01

## 2017-01-01 RX ORDER — LANTHANUM CARBONATE 500 MG/1
1000 TABLET, CHEWABLE ORAL
Status: DISCONTINUED | OUTPATIENT
Start: 2017-01-01 | End: 2017-01-01 | Stop reason: HOSPADM

## 2017-01-01 RX ORDER — NITROGLYCERIN 0.4 MG/1
0.4 TABLET SUBLINGUAL
Qty: 90 TABLET | Refills: 0 | Status: CANCELLED | OUTPATIENT
Start: 2017-01-01 | End: 2017-01-01

## 2017-01-01 RX ORDER — LEVOTHYROXINE SODIUM 0.1 MG/1
100 TABLET ORAL ONCE
Status: COMPLETED | OUTPATIENT
Start: 2017-01-01 | End: 2017-01-01

## 2017-01-01 RX ORDER — FENTANYL CITRATE 50 UG/ML
25 INJECTION, SOLUTION INTRAMUSCULAR; INTRAVENOUS EVERY 2 HOUR PRN
Status: DISCONTINUED | OUTPATIENT
Start: 2017-01-01 | End: 2017-01-01 | Stop reason: HOSPADM

## 2017-01-01 RX ORDER — LEVOTHYROXINE SODIUM 0.05 MG/1
50 TABLET ORAL
Status: DISCONTINUED | OUTPATIENT
Start: 2017-01-01 | End: 2017-01-01 | Stop reason: HOSPADM

## 2017-01-01 RX ORDER — ACETAMINOPHEN 325 MG/1
TABLET ORAL
Status: DISPENSED
Start: 2017-01-01 | End: 2017-01-01

## 2017-01-01 RX ORDER — ALBUMIN (HUMAN) 12.5 G/50ML
12.5 SOLUTION INTRAVENOUS ONCE
Status: COMPLETED | OUTPATIENT
Start: 2017-01-01 | End: 2017-01-01

## 2017-01-01 RX ORDER — AMMONIUM LACTATE 12 G/100G
1 CREAM TOPICAL 2 TIMES DAILY
Status: DISCONTINUED | OUTPATIENT
Start: 2017-01-01 | End: 2017-01-01 | Stop reason: HOSPADM

## 2017-01-01 RX ORDER — OXYCODONE HYDROCHLORIDE AND ACETAMINOPHEN 5; 325 MG/1; MG/1
1 TABLET ORAL ONCE
Status: DISCONTINUED | OUTPATIENT
Start: 2017-01-01 | End: 2017-01-01

## 2017-01-01 RX ORDER — CLOPIDOGREL BISULFATE 75 MG/1
TABLET ORAL CODE/TRAUMA/SEDATION MEDICATION
Status: COMPLETED | OUTPATIENT
Start: 2017-01-01 | End: 2017-01-01

## 2017-01-01 RX ORDER — MIDAZOLAM HYDROCHLORIDE 1 MG/ML
INJECTION INTRAMUSCULAR; INTRAVENOUS CODE/TRAUMA/SEDATION MEDICATION
Status: COMPLETED | OUTPATIENT
Start: 2017-01-01 | End: 2017-01-01

## 2017-01-01 RX ORDER — LIDOCAINE HYDROCHLORIDE 10 MG/ML
INJECTION, SOLUTION INFILTRATION; PERINEURAL CODE/TRAUMA/SEDATION MEDICATION
Status: COMPLETED | OUTPATIENT
Start: 2017-01-01 | End: 2017-01-01

## 2017-01-01 RX ORDER — RANOLAZINE 500 MG/1
500 TABLET, EXTENDED RELEASE ORAL EVERY 12 HOURS SCHEDULED
Status: DISCONTINUED | OUTPATIENT
Start: 2017-01-01 | End: 2017-01-01 | Stop reason: HOSPADM

## 2017-01-01 RX ORDER — HEPARIN SODIUM 1000 [USP'U]/ML
4000 INJECTION, SOLUTION INTRAVENOUS; SUBCUTANEOUS AS NEEDED
Status: DISCONTINUED | OUTPATIENT
Start: 2017-01-01 | End: 2017-01-01

## 2017-01-01 RX ORDER — MIDODRINE HYDROCHLORIDE 5 MG/1
5 TABLET ORAL
Status: DISCONTINUED | OUTPATIENT
Start: 2017-01-01 | End: 2017-01-01

## 2017-01-01 RX ORDER — ZOLPIDEM TARTRATE 5 MG/1
TABLET ORAL
Status: COMPLETED
Start: 2017-01-01 | End: 2017-01-01

## 2017-01-01 RX ORDER — PROPOFOL 10 MG/ML
INJECTION, EMULSION INTRAVENOUS AS NEEDED
Status: DISCONTINUED | OUTPATIENT
Start: 2017-01-01 | End: 2017-01-01 | Stop reason: SURG

## 2017-01-01 RX ORDER — NITROGLYCERIN 0.4 MG/1
0.4 TABLET SUBLINGUAL
Qty: 90 TABLET | Refills: 0 | Status: SHIPPED | OUTPATIENT
Start: 2017-01-01

## 2017-01-01 RX ORDER — RANOLAZINE 500 MG/1
500 TABLET, EXTENDED RELEASE ORAL EVERY 12 HOURS SCHEDULED
Status: DISCONTINUED | OUTPATIENT
Start: 2017-01-01 | End: 2017-01-01

## 2017-01-01 RX ORDER — LEVOTHYROXINE SODIUM 0.05 MG/1
50 TABLET ORAL
Status: DISCONTINUED | OUTPATIENT
Start: 2017-01-01 | End: 2017-01-01

## 2017-01-01 RX ORDER — ETOMIDATE 2 MG/ML
INJECTION INTRAVENOUS CODE/TRAUMA/SEDATION MEDICATION
Status: COMPLETED | OUTPATIENT
Start: 2017-01-01 | End: 2017-01-01

## 2017-01-01 RX ORDER — FEBUXOSTAT 40 MG/1
40 TABLET, FILM COATED ORAL DAILY
Status: DISCONTINUED | OUTPATIENT
Start: 2017-01-01 | End: 2017-01-01

## 2017-01-01 RX ORDER — MIDODRINE HYDROCHLORIDE 5 MG/1
5 TABLET ORAL 2 TIMES DAILY
COMMUNITY

## 2017-01-01 RX ORDER — CALCIUM CHLORIDE 100 MG/ML
SYRINGE (ML) INTRAVENOUS CODE/TRAUMA/SEDATION MEDICATION
Status: COMPLETED | OUTPATIENT
Start: 2017-01-01 | End: 2017-01-01

## 2017-01-01 RX ORDER — METHYLPREDNISOLONE 4 MG/1
12 TABLET ORAL DAILY
Status: DISCONTINUED | OUTPATIENT
Start: 2017-01-01 | End: 2017-01-01 | Stop reason: HOSPADM

## 2017-01-01 RX ORDER — PRASUGREL 10 MG/1
10 TABLET, FILM COATED ORAL DAILY
Status: DISCONTINUED | OUTPATIENT
Start: 2017-01-01 | End: 2017-01-01 | Stop reason: HOSPADM

## 2017-01-01 RX ORDER — ALBUMIN, HUMAN INJ 5% 5 %
SOLUTION INTRAVENOUS
Status: COMPLETED
Start: 2017-01-01 | End: 2017-01-01

## 2017-01-01 RX ORDER — ALBUTEROL SULFATE 90 UG/1
2 AEROSOL, METERED RESPIRATORY (INHALATION) EVERY 6 HOURS PRN
Status: DISCONTINUED | OUTPATIENT
Start: 2017-01-01 | End: 2017-01-01 | Stop reason: HOSPADM

## 2017-01-01 RX ORDER — NITROGLYCERIN 0.4 MG/1
0.4 TABLET SUBLINGUAL
Status: DISCONTINUED | OUTPATIENT
Start: 2017-01-01 | End: 2017-01-01 | Stop reason: HOSPADM

## 2017-01-01 RX ORDER — LACTULOSE 20 G/30ML
20 SOLUTION ORAL 3 TIMES DAILY
Status: DISCONTINUED | OUTPATIENT
Start: 2017-01-01 | End: 2017-01-01

## 2017-01-01 RX ORDER — DEXTROSE MONOHYDRATE 25 G/50ML
25 INJECTION, SOLUTION INTRAVENOUS AS NEEDED
Status: DISCONTINUED | OUTPATIENT
Start: 2017-01-01 | End: 2017-01-01 | Stop reason: HOSPADM

## 2017-01-01 RX ORDER — PANTOPRAZOLE SODIUM 40 MG/1
40 TABLET, DELAYED RELEASE ORAL DAILY
COMMUNITY
End: 2017-01-01 | Stop reason: HOSPADM

## 2017-01-01 RX ORDER — ASPIRIN 81 MG/1
81 TABLET, CHEWABLE ORAL DAILY
Status: DISCONTINUED | OUTPATIENT
Start: 2017-01-01 | End: 2017-01-01 | Stop reason: HOSPADM

## 2017-01-01 RX ORDER — DOCUSATE SODIUM 100 MG/1
100 CAPSULE, LIQUID FILLED ORAL 2 TIMES DAILY
Status: DISCONTINUED | OUTPATIENT
Start: 2017-01-01 | End: 2017-01-01

## 2017-01-01 RX ORDER — MINERAL OIL 100 G/100G
1 OIL RECTAL ONCE
Status: DISCONTINUED | OUTPATIENT
Start: 2017-01-01 | End: 2017-01-01

## 2017-01-01 RX ORDER — METHYLPREDNISOLONE 4 MG/1
4 TABLET ORAL DAILY
Qty: 1 TABLET | Refills: 0 | Status: SHIPPED | OUTPATIENT
Start: 2017-01-01 | End: 2017-01-01

## 2017-01-01 RX ORDER — LEVOTHYROXINE SODIUM 0.05 MG/1
50 TABLET ORAL
Qty: 30 TABLET | Refills: 0 | Status: SHIPPED | OUTPATIENT
Start: 2017-01-01 | End: 2017-01-01 | Stop reason: HOSPADM

## 2017-01-01 RX ORDER — LORAZEPAM 2 MG/ML
0.5 INJECTION INTRAMUSCULAR ONCE
Status: COMPLETED | OUTPATIENT
Start: 2017-01-01 | End: 2017-01-01

## 2017-01-01 RX ORDER — GABAPENTIN 100 MG/1
100 CAPSULE ORAL 2 TIMES DAILY
Qty: 30 CAPSULE | Refills: 0 | Status: SHIPPED | OUTPATIENT
Start: 2017-01-01

## 2017-01-01 RX ORDER — NOREPINEPHRINE BITARTRATE 1 MG/ML
INJECTION, SOLUTION INTRAVENOUS
Status: DISPENSED
Start: 2017-01-01 | End: 2017-01-01

## 2017-01-01 RX ORDER — SEVELAMER CARBONATE 800 MG/1
400 TABLET, FILM COATED ORAL
COMMUNITY

## 2017-01-01 RX ORDER — INSULIN GLARGINE 100 [IU]/ML
12 INJECTION, SOLUTION SUBCUTANEOUS
Status: DISCONTINUED | OUTPATIENT
Start: 2017-01-01 | End: 2017-01-01 | Stop reason: HOSPADM

## 2017-01-01 RX ORDER — CLOPIDOGREL BISULFATE 75 MG/1
75 TABLET ORAL DAILY
Qty: 30 TABLET | Refills: 11 | Status: CANCELLED | OUTPATIENT
Start: 2017-01-01

## 2017-01-01 RX ORDER — CINACALCET 30 MG/1
30 TABLET, FILM COATED ORAL EVERY EVENING
Status: DISCONTINUED | OUTPATIENT
Start: 2017-01-01 | End: 2017-01-01

## 2017-01-01 RX ORDER — GENTAMICIN SULFATE 1 MG/G
OINTMENT TOPICAL DAILY
Status: DISCONTINUED | OUTPATIENT
Start: 2017-01-01 | End: 2017-01-01 | Stop reason: HOSPADM

## 2017-01-01 RX ORDER — PANTOPRAZOLE SODIUM 20 MG/1
20 TABLET, DELAYED RELEASE ORAL
Status: DISCONTINUED | OUTPATIENT
Start: 2017-01-01 | End: 2017-01-01

## 2017-01-01 RX ORDER — HEPARIN SODIUM 1000 [USP'U]/ML
INJECTION, SOLUTION INTRAVENOUS; SUBCUTANEOUS CODE/TRAUMA/SEDATION MEDICATION
Status: COMPLETED | OUTPATIENT
Start: 2017-01-01 | End: 2017-01-01

## 2017-01-01 RX ORDER — ATROPINE SULFATE 0.4 MG/ML
0.4 AMPUL (ML) INJECTION ONCE
Status: DISCONTINUED | OUTPATIENT
Start: 2017-01-01 | End: 2017-01-01 | Stop reason: HOSPADM

## 2017-01-01 RX ORDER — SEVELAMER HYDROCHLORIDE 400 MG/1
400 TABLET, FILM COATED ORAL
Status: DISCONTINUED | OUTPATIENT
Start: 2017-01-01 | End: 2017-01-01 | Stop reason: HOSPADM

## 2017-01-01 RX ORDER — DOCUSATE SODIUM 100 MG/1
100 CAPSULE, LIQUID FILLED ORAL 2 TIMES DAILY
Status: DISCONTINUED | OUTPATIENT
Start: 2017-01-01 | End: 2017-01-01 | Stop reason: HOSPADM

## 2017-01-01 RX ORDER — SEVELAMER HYDROCHLORIDE 400 MG/1
400 TABLET, FILM COATED ORAL
Status: DISCONTINUED | OUTPATIENT
Start: 2017-01-01 | End: 2017-01-01

## 2017-01-01 RX ORDER — MIDODRINE HYDROCHLORIDE 5 MG/1
2.5 TABLET ORAL
Status: DISCONTINUED | OUTPATIENT
Start: 2017-01-01 | End: 2017-01-01

## 2017-01-01 RX ORDER — FEBUXOSTAT 40 MG/1
40 TABLET, FILM COATED ORAL DAILY
Status: DISCONTINUED | OUTPATIENT
Start: 2017-01-01 | End: 2017-01-01 | Stop reason: HOSPADM

## 2017-01-01 RX ORDER — SEVELAMER CARBONATE 800 MG/1
800 TABLET, FILM COATED ORAL
Status: DISCONTINUED | OUTPATIENT
Start: 2017-01-01 | End: 2017-01-01

## 2017-01-01 RX ORDER — HEPARIN SODIUM 1000 [USP'U]/ML
2000 INJECTION, SOLUTION INTRAVENOUS; SUBCUTANEOUS AS NEEDED
Status: DISCONTINUED | OUTPATIENT
Start: 2017-01-01 | End: 2017-01-01

## 2017-01-01 RX ORDER — ATORVASTATIN CALCIUM 80 MG/1
80 TABLET, FILM COATED ORAL
Qty: 30 TABLET | Refills: 11 | Status: SHIPPED | OUTPATIENT
Start: 2017-01-01 | End: 2017-01-01

## 2017-01-01 RX ORDER — MIDAZOLAM HYDROCHLORIDE 1 MG/ML
INJECTION INTRAMUSCULAR; INTRAVENOUS AS NEEDED
Status: DISCONTINUED | OUTPATIENT
Start: 2017-01-01 | End: 2017-01-01 | Stop reason: SURG

## 2017-01-01 RX ORDER — INSULIN GLARGINE 100 [IU]/ML
6 INJECTION, SOLUTION SUBCUTANEOUS EVERY MORNING
Status: DISCONTINUED | OUTPATIENT
Start: 2017-01-01 | End: 2017-01-01

## 2017-01-01 RX ORDER — AMMONIUM LACTATE 12 G/100G
1 CREAM TOPICAL 2 TIMES DAILY
Qty: 385 G | Refills: 0 | Status: SHIPPED | OUTPATIENT
Start: 2017-01-01

## 2017-01-01 RX ORDER — ONDANSETRON 4 MG/1
4 TABLET, ORALLY DISINTEGRATING ORAL EVERY 8 HOURS PRN
Qty: 20 TABLET | Refills: 0 | Status: SHIPPED | OUTPATIENT
Start: 2017-01-01 | End: 2017-01-01

## 2017-01-01 RX ORDER — NOREPINEPHRINE BITARTRATE 1 MG/ML
INJECTION, SOLUTION INTRAVENOUS
Status: COMPLETED
Start: 2017-01-01 | End: 2017-01-01

## 2017-01-01 RX ORDER — OXYCODONE HYDROCHLORIDE AND ACETAMINOPHEN 5; 325 MG/1; MG/1
1 TABLET ORAL EVERY 4 HOURS PRN
Status: DISCONTINUED | OUTPATIENT
Start: 2017-01-01 | End: 2017-01-01

## 2017-01-01 RX ORDER — ASPIRIN 81 MG/1
324 TABLET, CHEWABLE ORAL ONCE
Status: COMPLETED | OUTPATIENT
Start: 2017-01-01 | End: 2017-01-01

## 2017-01-01 RX ORDER — SODIUM CHLORIDE, SODIUM LACTATE, POTASSIUM CHLORIDE, CALCIUM CHLORIDE 600; 310; 30; 20 MG/100ML; MG/100ML; MG/100ML; MG/100ML
INJECTION, SOLUTION INTRAVENOUS CONTINUOUS PRN
Status: DISCONTINUED | OUTPATIENT
Start: 2017-01-01 | End: 2017-01-01 | Stop reason: SURG

## 2017-01-01 RX ORDER — INSULIN GLARGINE 100 [IU]/ML
12 INJECTION, SOLUTION SUBCUTANEOUS
Status: DISCONTINUED | OUTPATIENT
Start: 2017-01-01 | End: 2017-01-01

## 2017-01-01 RX ORDER — MIDODRINE HYDROCHLORIDE 5 MG/1
5 TABLET ORAL 2 TIMES DAILY WITH MEALS
Status: DISCONTINUED | OUTPATIENT
Start: 2017-01-01 | End: 2017-01-01

## 2017-01-01 RX ORDER — EPINEPHRINE 0.1 MG/ML
SYRINGE (ML) INJECTION CODE/TRAUMA/SEDATION MEDICATION
Status: COMPLETED | OUTPATIENT
Start: 2017-01-01 | End: 2017-01-01

## 2017-01-01 RX ORDER — ASPIRIN 81 MG/1
81 TABLET, CHEWABLE ORAL DAILY
Qty: 30 TABLET | Refills: 11 | Status: SHIPPED | OUTPATIENT
Start: 2017-01-01 | End: 2017-01-01 | Stop reason: HOSPADM

## 2017-01-01 RX ORDER — ONDANSETRON 4 MG/1
4 TABLET, FILM COATED ORAL EVERY 8 HOURS PRN
COMMUNITY

## 2017-01-01 RX ORDER — LANTHANUM CARBONATE 1000 MG/1
1000 TABLET, CHEWABLE ORAL
Qty: 30 TABLET | Refills: 0 | Status: SHIPPED | OUTPATIENT
Start: 2017-01-01

## 2017-01-01 RX ORDER — ATORVASTATIN CALCIUM 80 MG/1
80 TABLET, FILM COATED ORAL
Status: DISCONTINUED | OUTPATIENT
Start: 2017-01-01 | End: 2017-01-01 | Stop reason: HOSPADM

## 2017-01-01 RX ORDER — ALBUMIN (HUMAN) 12.5 G/50ML
25 SOLUTION INTRAVENOUS ONCE
Status: COMPLETED | OUTPATIENT
Start: 2017-01-01 | End: 2017-01-01

## 2017-01-01 RX ORDER — INSULIN GLARGINE 100 [IU]/ML
6 INJECTION, SOLUTION SUBCUTANEOUS ONCE
Status: COMPLETED | OUTPATIENT
Start: 2017-01-01 | End: 2017-01-01

## 2017-01-01 RX ORDER — PRAVASTATIN SODIUM 80 MG/1
80 TABLET ORAL
Qty: 30 TABLET | Refills: 0 | Status: SHIPPED | OUTPATIENT
Start: 2017-01-01

## 2017-01-01 RX ORDER — SODIUM BICARBONATE 84 MG/ML
INJECTION, SOLUTION INTRAVENOUS CODE/TRAUMA/SEDATION MEDICATION
Status: COMPLETED | OUTPATIENT
Start: 2017-01-01 | End: 2017-01-01

## 2017-01-01 RX ORDER — ONDANSETRON 2 MG/ML
INJECTION INTRAMUSCULAR; INTRAVENOUS
Status: COMPLETED
Start: 2017-01-01 | End: 2017-01-01

## 2017-01-01 RX ORDER — VALSARTAN 80 MG/1
40 TABLET ORAL DAILY
Status: DISCONTINUED | OUTPATIENT
Start: 2017-01-01 | End: 2017-01-01 | Stop reason: HOSPADM

## 2017-01-01 RX ORDER — LANOLIN ALCOHOL/MO/W.PET/CERES
3 CREAM (GRAM) TOPICAL ONCE
Status: COMPLETED | OUTPATIENT
Start: 2017-01-01 | End: 2017-01-01

## 2017-01-01 RX ORDER — LANTHANUM CARBONATE 500 MG/1
1000 TABLET, CHEWABLE ORAL
Status: DISCONTINUED | OUTPATIENT
Start: 2017-01-01 | End: 2017-01-01

## 2017-01-01 RX ORDER — OXYMETAZOLINE HYDROCHLORIDE 0.05 G/100ML
2 SPRAY NASAL 2 TIMES DAILY PRN
Status: DISPENSED | OUTPATIENT
Start: 2017-01-01 | End: 2017-01-01

## 2017-01-01 RX ORDER — HEPARIN SODIUM 5000 [USP'U]/ML
5000 INJECTION, SOLUTION INTRAVENOUS; SUBCUTANEOUS EVERY 8 HOURS SCHEDULED
Status: DISCONTINUED | OUTPATIENT
Start: 2017-01-01 | End: 2017-01-01 | Stop reason: HOSPADM

## 2017-01-01 RX ORDER — INSULIN GLARGINE 100 [IU]/ML
12 INJECTION, SOLUTION SUBCUTANEOUS EVERY MORNING
Status: DISCONTINUED | OUTPATIENT
Start: 2017-01-01 | End: 2017-01-01

## 2017-01-01 RX ORDER — AMMONIUM LACTATE 12 G/100G
CREAM TOPICAL 2 TIMES DAILY
Status: DISCONTINUED | OUTPATIENT
Start: 2017-01-01 | End: 2017-01-01 | Stop reason: HOSPADM

## 2017-01-01 RX ORDER — CALCIUM CHLORIDE 100 MG/ML
INJECTION INTRAVENOUS; INTRAVENTRICULAR
Status: DISCONTINUED
Start: 2017-01-01 | End: 2017-01-01 | Stop reason: HOSPADM

## 2017-01-01 RX ORDER — MIDODRINE HYDROCHLORIDE 5 MG/1
5 TABLET ORAL 3 TIMES DAILY
Status: DISCONTINUED | OUTPATIENT
Start: 2017-01-01 | End: 2017-01-01

## 2017-01-01 RX ORDER — GENTAMICIN SULFATE 1 MG/G
1 OINTMENT TOPICAL DAILY
Status: DISCONTINUED | OUTPATIENT
Start: 2017-01-01 | End: 2017-01-01 | Stop reason: HOSPADM

## 2017-01-01 RX ORDER — ONDANSETRON 2 MG/ML
4 INJECTION INTRAMUSCULAR; INTRAVENOUS ONCE
Status: COMPLETED | OUTPATIENT
Start: 2017-01-01 | End: 2017-01-01

## 2017-01-01 RX ORDER — METHYLPREDNISOLONE 16 MG/1
16 TABLET ORAL DAILY
Status: COMPLETED | OUTPATIENT
Start: 2017-01-01 | End: 2017-01-01

## 2017-01-01 RX ORDER — MECLIZINE HYDROCHLORIDE 25 MG/1
25 TABLET ORAL 3 TIMES DAILY PRN
Status: DISCONTINUED | OUTPATIENT
Start: 2017-01-01 | End: 2017-01-01

## 2017-01-01 RX ORDER — CLOPIDOGREL BISULFATE 75 MG/1
75 TABLET ORAL DAILY
Status: DISCONTINUED | OUTPATIENT
Start: 2017-01-01 | End: 2017-01-01 | Stop reason: HOSPADM

## 2017-01-01 RX ORDER — ATORVASTATIN CALCIUM 80 MG/1
80 TABLET, FILM COATED ORAL
Qty: 30 TABLET | Refills: 11 | Status: CANCELLED | OUTPATIENT
Start: 2017-01-01

## 2017-01-01 RX ORDER — OMEPRAZOLE 20 MG/1
20 CAPSULE, DELAYED RELEASE ORAL DAILY
COMMUNITY
End: 2017-01-01

## 2017-01-01 RX ORDER — ACETAMINOPHEN 325 MG/1
650 TABLET ORAL EVERY 6 HOURS PRN
Status: DISCONTINUED | OUTPATIENT
Start: 2017-01-01 | End: 2017-01-01 | Stop reason: HOSPADM

## 2017-01-01 RX ORDER — MAGNESIUM HYDROXIDE/ALUMINUM HYDROXICE/SIMETHICONE 120; 1200; 1200 MG/30ML; MG/30ML; MG/30ML
15 SUSPENSION ORAL ONCE
Status: COMPLETED | OUTPATIENT
Start: 2017-01-01 | End: 2017-01-01

## 2017-01-01 RX ORDER — LORAZEPAM 2 MG/ML
INJECTION INTRAMUSCULAR
Status: COMPLETED
Start: 2017-01-01 | End: 2017-01-01

## 2017-01-01 RX ORDER — GENTAMICIN SULFATE 1 MG/G
1 OINTMENT TOPICAL 3 TIMES DAILY
Status: DISCONTINUED | OUTPATIENT
Start: 2017-01-01 | End: 2017-01-01

## 2017-01-01 RX ORDER — GENTAMICIN SULFATE 1 MG/G
OINTMENT TOPICAL 3 TIMES DAILY
COMMUNITY
End: 2017-01-01 | Stop reason: HOSPADM

## 2017-01-01 RX ORDER — ATORVASTATIN CALCIUM 80 MG/1
80 TABLET, FILM COATED ORAL
Status: DISCONTINUED | OUTPATIENT
Start: 2017-01-01 | End: 2017-01-01

## 2017-01-01 RX ORDER — METOCLOPRAMIDE HYDROCHLORIDE 5 MG/ML
10 INJECTION INTRAMUSCULAR; INTRAVENOUS EVERY 6 HOURS PRN
Status: DISCONTINUED | OUTPATIENT
Start: 2017-01-01 | End: 2017-01-01 | Stop reason: HOSPADM

## 2017-01-01 RX ORDER — OXYCODONE HYDROCHLORIDE AND ACETAMINOPHEN 5; 325 MG/1; MG/1
1 TABLET ORAL EVERY 4 HOURS PRN
Qty: 40 TABLET | Refills: 0 | Status: SHIPPED | OUTPATIENT
Start: 2017-01-01 | End: 2017-01-01

## 2017-01-01 RX ORDER — ASPIRIN 81 MG/1
81 TABLET, CHEWABLE ORAL DAILY
Qty: 30 TABLET | Refills: 11 | Status: CANCELLED | OUTPATIENT
Start: 2017-01-01

## 2017-01-01 RX ORDER — PANTOPRAZOLE SODIUM 40 MG/1
40 TABLET, DELAYED RELEASE ORAL
Qty: 30 TABLET | Refills: 0 | Status: SHIPPED | OUTPATIENT
Start: 2017-01-01

## 2017-01-01 RX ORDER — CINACALCET 30 MG/1
30 TABLET, FILM COATED ORAL EVERY EVENING
COMMUNITY

## 2017-01-01 RX ORDER — ZOLPIDEM TARTRATE 5 MG/1
5 TABLET ORAL ONCE
Status: COMPLETED | OUTPATIENT
Start: 2017-01-01 | End: 2017-01-01

## 2017-01-01 RX ORDER — FENTANYL CITRATE 50 UG/ML
INJECTION, SOLUTION INTRAMUSCULAR; INTRAVENOUS AS NEEDED
Status: DISCONTINUED | OUTPATIENT
Start: 2017-01-01 | End: 2017-01-01 | Stop reason: SURG

## 2017-01-01 RX ORDER — LIDOCAINE HYDROCHLORIDE 10 MG/ML
INJECTION, SOLUTION INFILTRATION; PERINEURAL AS NEEDED
Status: DISCONTINUED | OUTPATIENT
Start: 2017-01-01 | End: 2017-01-01 | Stop reason: HOSPADM

## 2017-01-01 RX ORDER — FEBUXOSTAT 40 MG/1
40 TABLET, FILM COATED ORAL DAILY
COMMUNITY
End: 2017-01-01 | Stop reason: HOSPADM

## 2017-01-01 RX ORDER — PANTOPRAZOLE SODIUM 40 MG/1
40 TABLET, DELAYED RELEASE ORAL
Status: DISCONTINUED | OUTPATIENT
Start: 2017-01-01 | End: 2017-01-01

## 2017-01-01 RX ORDER — POLYETHYLENE GLYCOL 3350 17 G/17G
17 POWDER, FOR SOLUTION ORAL DAILY PRN
Status: DISCONTINUED | OUTPATIENT
Start: 2017-01-01 | End: 2017-01-01

## 2017-01-01 RX ORDER — PRAVASTATIN SODIUM 80 MG/1
80 TABLET ORAL
Status: DISCONTINUED | OUTPATIENT
Start: 2017-01-01 | End: 2017-01-01

## 2017-01-01 RX ORDER — METHYLPREDNISOLONE 4 MG/1
8 TABLET ORAL DAILY
Status: DISCONTINUED | OUTPATIENT
Start: 2017-01-01 | End: 2017-01-01 | Stop reason: HOSPADM

## 2017-01-01 RX ORDER — SODIUM CHLORIDE, SODIUM LACTATE, POTASSIUM CHLORIDE, CALCIUM CHLORIDE 600; 310; 30; 20 MG/100ML; MG/100ML; MG/100ML; MG/100ML
75 INJECTION, SOLUTION INTRAVENOUS CONTINUOUS
Status: DISCONTINUED | OUTPATIENT
Start: 2017-01-01 | End: 2017-01-01 | Stop reason: HOSPADM

## 2017-01-01 RX ORDER — VECURONIUM BROMIDE 1 MG/ML
INJECTION, POWDER, LYOPHILIZED, FOR SOLUTION INTRAVENOUS CODE/TRAUMA/SEDATION MEDICATION
Status: COMPLETED | OUTPATIENT
Start: 2017-01-01 | End: 2017-01-01

## 2017-01-01 RX ORDER — NITROGLYCERIN 20 MG/100ML
INJECTION INTRAVENOUS CODE/TRAUMA/SEDATION MEDICATION
Status: COMPLETED | OUTPATIENT
Start: 2017-01-01 | End: 2017-01-01

## 2017-01-01 RX ORDER — GABAPENTIN 100 MG/1
100 CAPSULE ORAL 2 TIMES DAILY
Status: DISCONTINUED | OUTPATIENT
Start: 2017-01-01 | End: 2017-01-01

## 2017-01-01 RX ORDER — VALSARTAN 40 MG/1
40 TABLET ORAL DAILY
Qty: 30 TABLET | Refills: 0 | Status: SHIPPED | OUTPATIENT
Start: 2017-01-01

## 2017-01-01 RX ORDER — PANTOPRAZOLE SODIUM 40 MG/1
40 INJECTION, POWDER, FOR SOLUTION INTRAVENOUS
Status: DISCONTINUED | OUTPATIENT
Start: 2017-01-01 | End: 2017-01-01 | Stop reason: HOSPADM

## 2017-01-01 RX ORDER — ASPIRIN 81 MG/1
81 TABLET, CHEWABLE ORAL DAILY
Qty: 30 TABLET | Refills: 0 | Status: SHIPPED | OUTPATIENT
Start: 2017-01-01

## 2017-01-01 RX ORDER — INSULIN GLARGINE 100 [IU]/ML
12 INJECTION, SOLUTION SUBCUTANEOUS EVERY MORNING
Qty: 10 ML | Refills: 0 | Status: SHIPPED | OUTPATIENT
Start: 2017-01-01

## 2017-01-01 RX ORDER — HEPARIN SODIUM 10000 [USP'U]/100ML
3-20 INJECTION, SOLUTION INTRAVENOUS
Status: DISCONTINUED | OUTPATIENT
Start: 2017-01-01 | End: 2017-01-01

## 2017-01-01 RX ORDER — HEPARIN SODIUM 5000 [USP'U]/ML
5000 INJECTION, SOLUTION INTRAVENOUS; SUBCUTANEOUS EVERY 8 HOURS SCHEDULED
Status: DISCONTINUED | OUTPATIENT
Start: 2017-01-01 | End: 2017-01-01

## 2017-01-01 RX ORDER — SENNOSIDES 8.6 MG
1 TABLET ORAL DAILY
Qty: 120 EACH | Refills: 0 | Status: SHIPPED | OUTPATIENT
Start: 2017-01-01

## 2017-01-01 RX ORDER — LACTULOSE 20 G/30ML
30 SOLUTION ORAL 4 TIMES DAILY
Status: DISCONTINUED | OUTPATIENT
Start: 2017-01-01 | End: 2017-01-01

## 2017-01-01 RX ORDER — METHYLPREDNISOLONE 8 MG/1
8 TABLET ORAL DAILY
Qty: 2 TABLET | Refills: 0 | Status: SHIPPED | OUTPATIENT
Start: 2017-01-01 | End: 2017-01-01

## 2017-01-01 RX ORDER — POLYETHYLENE GLYCOL 3350 17 G/17G
17 POWDER, FOR SOLUTION ORAL 2 TIMES DAILY
Status: DISCONTINUED | OUTPATIENT
Start: 2017-01-01 | End: 2017-01-01

## 2017-01-01 RX ORDER — PANTOPRAZOLE SODIUM 40 MG/1
40 TABLET, DELAYED RELEASE ORAL DAILY
Status: DISCONTINUED | OUTPATIENT
Start: 2017-01-01 | End: 2017-01-01 | Stop reason: SDUPTHER

## 2017-01-01 RX ORDER — LEVOTHYROXINE SODIUM 0.05 MG/1
50 TABLET ORAL
Qty: 30 TABLET | Refills: 0 | Status: SHIPPED | OUTPATIENT
Start: 2017-01-01

## 2017-01-01 RX ORDER — VALSARTAN 160 MG/1
160 TABLET ORAL
Status: DISCONTINUED | OUTPATIENT
Start: 2017-01-01 | End: 2017-01-01 | Stop reason: HOSPADM

## 2017-01-01 RX ORDER — GENTAMICIN SULFATE 1 MG/G
1 OINTMENT TOPICAL 3 TIMES DAILY
Qty: 15 G | Refills: 0 | Status: SHIPPED | OUTPATIENT
Start: 2017-01-01

## 2017-01-01 RX ORDER — SODIUM CHLORIDE 9 MG/ML
50 INJECTION, SOLUTION INTRAVENOUS CONTINUOUS
Status: DISPENSED | OUTPATIENT
Start: 2017-01-01 | End: 2017-01-01

## 2017-01-01 RX ORDER — GABAPENTIN 100 MG/1
100 CAPSULE ORAL 2 TIMES DAILY
Status: DISCONTINUED | OUTPATIENT
Start: 2017-01-01 | End: 2017-01-01 | Stop reason: HOSPADM

## 2017-01-01 RX ORDER — GENTAMICIN SULFATE 1 MG/G
OINTMENT TOPICAL DAILY
Status: DISCONTINUED | OUTPATIENT
Start: 2017-01-01 | End: 2017-01-01 | Stop reason: SDUPTHER

## 2017-01-01 RX ORDER — GENTAMICIN SULFATE 1 MG/G
OINTMENT TOPICAL AS NEEDED
COMMUNITY
End: 2017-01-01

## 2017-01-01 RX ORDER — CEPHALEXIN 500 MG/1
500 CAPSULE ORAL 2 TIMES DAILY
Status: ON HOLD | COMMUNITY
End: 2017-01-01

## 2017-01-01 RX ORDER — SEVELAMER HYDROCHLORIDE 400 MG/1
400 TABLET, FILM COATED ORAL
Qty: 30 TABLET | Refills: 0 | Status: SHIPPED | OUTPATIENT
Start: 2017-01-01

## 2017-01-01 RX ORDER — ALBUTEROL SULFATE 90 UG/1
2 AEROSOL, METERED RESPIRATORY (INHALATION) EVERY 6 HOURS PRN
Qty: 6.7 G | Refills: 0 | Status: SHIPPED | OUTPATIENT
Start: 2017-01-01

## 2017-01-01 RX ORDER — SENNOSIDES 8.6 MG
1 TABLET ORAL DAILY
Status: DISCONTINUED | OUTPATIENT
Start: 2017-01-01 | End: 2017-01-01

## 2017-01-01 RX ORDER — ATORVASTATIN CALCIUM 40 MG/1
40 TABLET, FILM COATED ORAL
Status: DISCONTINUED | OUTPATIENT
Start: 2017-01-01 | End: 2017-01-01

## 2017-01-01 RX ORDER — CLOPIDOGREL BISULFATE 75 MG/1
600 TABLET ORAL ONCE
Status: DISCONTINUED | OUTPATIENT
Start: 2017-01-01 | End: 2017-01-01 | Stop reason: HOSPADM

## 2017-01-01 RX ORDER — RANOLAZINE 500 MG/1
500 TABLET, EXTENDED RELEASE ORAL EVERY 12 HOURS SCHEDULED
Qty: 30 TABLET | Refills: 0 | Status: SHIPPED | OUTPATIENT
Start: 2017-01-01

## 2017-01-01 RX ADMIN — EPINEPHRINE 1 MG: 0.1 INJECTION, SOLUTION ENDOTRACHEAL; INTRACARDIAC; INTRAVENOUS at 02:16

## 2017-01-01 RX ADMIN — PRAVASTATIN SODIUM 80 MG: 40 TABLET ORAL at 16:41

## 2017-01-01 RX ADMIN — ATORVASTATIN CALCIUM 40 MG: 40 TABLET, FILM COATED ORAL at 17:12

## 2017-01-01 RX ADMIN — ONDANSETRON 4 MG: 2 INJECTION INTRAMUSCULAR; INTRAVENOUS at 11:16

## 2017-01-01 RX ADMIN — DOCUSATE SODIUM 100 MG: 100 CAPSULE, LIQUID FILLED ORAL at 17:03

## 2017-01-01 RX ADMIN — HEPARIN SODIUM 5000 UNITS: 5000 INJECTION, SOLUTION INTRAVENOUS; SUBCUTANEOUS at 06:09

## 2017-01-01 RX ADMIN — GABAPENTIN 100 MG: 100 CAPSULE ORAL at 21:11

## 2017-01-01 RX ADMIN — SENNOSIDES 8.6 MG: 8.6 TABLET, FILM COATED ORAL at 08:17

## 2017-01-01 RX ADMIN — HEPARIN SODIUM 5000 UNITS: 5000 INJECTION, SOLUTION INTRAVENOUS; SUBCUTANEOUS at 05:02

## 2017-01-01 RX ADMIN — HEPARIN SODIUM 5000 UNITS: 5000 INJECTION, SOLUTION INTRAVENOUS; SUBCUTANEOUS at 21:24

## 2017-01-01 RX ADMIN — RANOLAZINE 500 MG: 500 TABLET, FILM COATED, EXTENDED RELEASE ORAL at 08:14

## 2017-01-01 RX ADMIN — GABAPENTIN 100 MG: 100 CAPSULE ORAL at 18:25

## 2017-01-01 RX ADMIN — DOCUSATE SODIUM 100 MG: 100 CAPSULE, LIQUID FILLED ORAL at 08:22

## 2017-01-01 RX ADMIN — ALBUMIN HUMAN 12.5 G: 0.05 INJECTION, SOLUTION INTRAVENOUS at 04:25

## 2017-01-01 RX ADMIN — EPINEPHRINE 1 MG: 0.1 INJECTION, SOLUTION ENDOTRACHEAL; INTRACARDIAC; INTRAVENOUS at 01:52

## 2017-01-01 RX ADMIN — GENTAMICIN SULFATE: 1 OINTMENT TOPICAL at 22:31

## 2017-01-01 RX ADMIN — Medication 1 APPLICATION: at 20:06

## 2017-01-01 RX ADMIN — TICAGRELOR 90 MG: 90 TABLET ORAL at 09:09

## 2017-01-01 RX ADMIN — POLYETHYLENE GLYCOL 3350 17 G: 17 POWDER, FOR SOLUTION ORAL at 10:17

## 2017-01-01 RX ADMIN — ASPIRIN 81 MG 81 MG: 81 TABLET ORAL at 11:52

## 2017-01-01 RX ADMIN — RANOLAZINE 500 MG: 500 TABLET, FILM COATED, EXTENDED RELEASE ORAL at 21:11

## 2017-01-01 RX ADMIN — HEPARIN SODIUM 5000 UNITS: 5000 INJECTION, SOLUTION INTRAVENOUS; SUBCUTANEOUS at 23:56

## 2017-01-01 RX ADMIN — ONDANSETRON 4 MG: 2 INJECTION INTRAMUSCULAR; INTRAVENOUS at 16:52

## 2017-01-01 RX ADMIN — LANTHANUM CARBONATE 1000 MG: 500 TABLET, CHEWABLE ORAL at 18:15

## 2017-01-01 RX ADMIN — ASPIRIN 81 MG 81 MG: 81 TABLET ORAL at 08:03

## 2017-01-01 RX ADMIN — FAMOTIDINE 20 MG: 10 INJECTION, SOLUTION INTRAVENOUS at 18:35

## 2017-01-01 RX ADMIN — POTASSIUM CHLORIDE: 2 INJECTION, SOLUTION, CONCENTRATE INTRAVENOUS at 22:41

## 2017-01-01 RX ADMIN — HEPARIN SODIUM AND DEXTROSE 26 UNITS/KG/HR: 10000; 5 INJECTION INTRAVENOUS at 00:10

## 2017-01-01 RX ADMIN — MIDODRINE HYDROCHLORIDE 5 MG: 5 TABLET ORAL at 16:52

## 2017-01-01 RX ADMIN — LEVOTHYROXINE SODIUM 50 MCG: 50 TABLET ORAL at 07:47

## 2017-01-01 RX ADMIN — VECURONIUM BROMIDE 10 MG: 1 INJECTION, POWDER, LYOPHILIZED, FOR SOLUTION INTRAVENOUS at 01:44

## 2017-01-01 RX ADMIN — HEPARIN SODIUM 5000 UNITS: 5000 INJECTION, SOLUTION INTRAVENOUS; SUBCUTANEOUS at 13:31

## 2017-01-01 RX ADMIN — DOCUSATE SODIUM 100 MG: 100 CAPSULE, LIQUID FILLED ORAL at 08:38

## 2017-01-01 RX ADMIN — RENAGEL 400 MG: 400 TABLET ORAL at 12:30

## 2017-01-01 RX ADMIN — CLOPIDOGREL BISULFATE 75 MG: 75 TABLET ORAL at 08:40

## 2017-01-01 RX ADMIN — CALCIUM GLUCONATE 3 G: 94 INJECTION, SOLUTION INTRAVENOUS at 17:13

## 2017-01-01 RX ADMIN — METHYLPREDNISOLONE 20 MG: 16 TABLET ORAL at 10:41

## 2017-01-01 RX ADMIN — Medication 5000 ML: at 02:15

## 2017-01-01 RX ADMIN — ACETAMINOPHEN 650 MG: 325 TABLET, FILM COATED ORAL at 15:08

## 2017-01-01 RX ADMIN — ATORVASTATIN CALCIUM 40 MG: 40 TABLET, FILM COATED ORAL at 16:52

## 2017-01-01 RX ADMIN — RENAGEL 400 MG: 400 TABLET ORAL at 08:37

## 2017-01-01 RX ADMIN — TICAGRELOR 90 MG: 90 TABLET ORAL at 08:03

## 2017-01-01 RX ADMIN — Medication 5000 ML: at 00:44

## 2017-01-01 RX ADMIN — HYDROMORPHONE HYDROCHLORIDE 0.5 MG: 1 INJECTION, SOLUTION INTRAMUSCULAR; INTRAVENOUS; SUBCUTANEOUS at 20:02

## 2017-01-01 RX ADMIN — RANOLAZINE 500 MG: 500 TABLET, FILM COATED, EXTENDED RELEASE ORAL at 21:53

## 2017-01-01 RX ADMIN — CALCIUM ACETATE 1334 MG: 667 CAPSULE ORAL at 17:12

## 2017-01-01 RX ADMIN — FEBUXOSTAT 40 MG: 40 TABLET, FILM COATED ORAL at 08:05

## 2017-01-01 RX ADMIN — CALCIUM GLUCONATE 1 G: 94 INJECTION, SOLUTION INTRAVENOUS at 19:03

## 2017-01-01 RX ADMIN — LEVOTHYROXINE SODIUM 50 MCG: 50 TABLET ORAL at 06:44

## 2017-01-01 RX ADMIN — REGADENOSON 0.4 MG: 0.08 INJECTION, SOLUTION INTRAVENOUS at 13:39

## 2017-01-01 RX ADMIN — SENNOSIDES 8.6 MG: 8.6 TABLET, FILM COATED ORAL at 09:01

## 2017-01-01 RX ADMIN — SODIUM BICARBONATE 100 MEQ: 84 INJECTION, SOLUTION INTRAVENOUS at 01:52

## 2017-01-01 RX ADMIN — POLYETHYLENE GLYCOL 3350 17 G: 17 POWDER, FOR SOLUTION ORAL at 08:13

## 2017-01-01 RX ADMIN — Medication 2 TABLET: at 17:17

## 2017-01-01 RX ADMIN — ASPIRIN 81 MG 81 MG: 81 TABLET ORAL at 08:13

## 2017-01-01 RX ADMIN — GENTAMICIN SULFATE: 1 OINTMENT TOPICAL at 22:52

## 2017-01-01 RX ADMIN — Medication 1 APPLICATION: at 17:13

## 2017-01-01 RX ADMIN — SODIUM CHLORIDE 1000 ML: 0.9 INJECTION, SOLUTION INTRAVENOUS at 14:30

## 2017-01-01 RX ADMIN — EPINEPHRINE 1 MG: 0.1 INJECTION, SOLUTION ENDOTRACHEAL; INTRACARDIAC; INTRAVENOUS at 01:42

## 2017-01-01 RX ADMIN — SENNOSIDES 8.6 MG: 8.6 TABLET, FILM COATED ORAL at 10:17

## 2017-01-01 RX ADMIN — HEPARIN SODIUM 5000 UNITS: 5000 INJECTION, SOLUTION INTRAVENOUS; SUBCUTANEOUS at 05:42

## 2017-01-01 RX ADMIN — INSULIN GLARGINE 12 UNITS: 100 INJECTION, SOLUTION SUBCUTANEOUS at 22:58

## 2017-01-01 RX ADMIN — Medication: at 17:51

## 2017-01-01 RX ADMIN — Medication: at 08:18

## 2017-01-01 RX ADMIN — LANTHANUM CARBONATE 1000 MG: 500 TABLET, CHEWABLE ORAL at 06:51

## 2017-01-01 RX ADMIN — ATORVASTATIN CALCIUM 40 MG: 40 TABLET, FILM COATED ORAL at 18:15

## 2017-01-01 RX ADMIN — ASPIRIN 81 MG 81 MG: 81 TABLET ORAL at 08:59

## 2017-01-01 RX ADMIN — ACETAMINOPHEN 650 MG: 325 TABLET, FILM COATED ORAL at 17:10

## 2017-01-01 RX ADMIN — MIDODRINE HYDROCHLORIDE 5 MG: 5 TABLET ORAL at 23:56

## 2017-01-01 RX ADMIN — ALUMINUM HYDROXIDE, MAGNESIUM HYDROXIDE, AND SIMETHICONE 15 ML: 200; 200; 20 SUSPENSION ORAL at 22:38

## 2017-01-01 RX ADMIN — INSULIN LISPRO 2 UNITS: 100 INJECTION, SOLUTION INTRAVENOUS; SUBCUTANEOUS at 08:39

## 2017-01-01 RX ADMIN — ONDANSETRON 4 MG: 2 INJECTION INTRAMUSCULAR; INTRAVENOUS at 18:31

## 2017-01-01 RX ADMIN — MIDODRINE HYDROCHLORIDE 5 MG: 5 TABLET ORAL at 09:08

## 2017-01-01 RX ADMIN — HEPARIN SODIUM 5000 UNITS: 5000 INJECTION, SOLUTION INTRAVENOUS; SUBCUTANEOUS at 05:56

## 2017-01-01 RX ADMIN — LIDOCAINE HYDROCHLORIDE: 10 INJECTION, SOLUTION EPIDURAL; INFILTRATION; INTRACAUDAL; PERINEURAL at 13:45

## 2017-01-01 RX ADMIN — ASPIRIN 81 MG: 81 TABLET, CHEWABLE ORAL at 09:13

## 2017-01-01 RX ADMIN — ONDANSETRON 4 MG: 2 INJECTION INTRAMUSCULAR; INTRAVENOUS at 16:06

## 2017-01-01 RX ADMIN — EPINEPHRINE 1 MG: 0.1 INJECTION, SOLUTION ENDOTRACHEAL; INTRACARDIAC; INTRAVENOUS at 01:46

## 2017-01-01 RX ADMIN — LANTHANUM CARBONATE 1000 MG: 500 TABLET, CHEWABLE ORAL at 17:08

## 2017-01-01 RX ADMIN — PRAVASTATIN SODIUM 80 MG: 40 TABLET ORAL at 16:31

## 2017-01-01 RX ADMIN — GABAPENTIN 100 MG: 100 CAPSULE ORAL at 08:25

## 2017-01-01 RX ADMIN — TICAGRELOR 90 MG: 90 TABLET ORAL at 21:33

## 2017-01-01 RX ADMIN — ASPIRIN 81 MG: 81 TABLET, CHEWABLE ORAL at 08:39

## 2017-01-01 RX ADMIN — PROPOFOL 20 MG: 10 INJECTION, EMULSION INTRAVENOUS at 13:59

## 2017-01-01 RX ADMIN — ZOLPIDEM TARTRATE 5 MG: 5 TABLET ORAL at 00:59

## 2017-01-01 RX ADMIN — PHENYLEPHRINE HYDROCHLORIDE 100 MCG: 10 INJECTION INTRAVENOUS at 15:06

## 2017-01-01 RX ADMIN — HEPARIN SODIUM 2000 UNITS: 1000 INJECTION, SOLUTION INTRAVENOUS; SUBCUTANEOUS at 01:28

## 2017-01-01 RX ADMIN — OXYCODONE HYDROCHLORIDE AND ACETAMINOPHEN 2 TABLET: 5; 325 TABLET ORAL at 21:08

## 2017-01-01 RX ADMIN — GENTAMICIN SULFATE: 1 OINTMENT TOPICAL at 21:24

## 2017-01-01 RX ADMIN — RANOLAZINE 500 MG: 500 TABLET, FILM COATED, EXTENDED RELEASE ORAL at 23:09

## 2017-01-01 RX ADMIN — GABAPENTIN 100 MG: 100 CAPSULE ORAL at 09:07

## 2017-01-01 RX ADMIN — RANOLAZINE 500 MG: 500 TABLET, FILM COATED, EXTENDED RELEASE ORAL at 08:17

## 2017-01-01 RX ADMIN — NOREPINEPHRINE BITARTRATE 1 MCG/MIN: 1 INJECTION INTRAVENOUS at 17:01

## 2017-01-01 RX ADMIN — PANTOPRAZOLE SODIUM 40 MG: 40 TABLET, DELAYED RELEASE ORAL at 05:42

## 2017-01-01 RX ADMIN — RENAGEL 800 MG: 800 TABLET ORAL at 12:37

## 2017-01-01 RX ADMIN — POTASSIUM CHLORIDE 40 MEQ: 1500 TABLET, EXTENDED RELEASE ORAL at 12:52

## 2017-01-01 RX ADMIN — SODIUM CHLORIDE 60 ML/HR: 0.9 INJECTION, SOLUTION INTRAVENOUS at 09:31

## 2017-01-01 RX ADMIN — TICAGRELOR 90 MG: 90 TABLET ORAL at 23:56

## 2017-01-01 RX ADMIN — HEPARIN SODIUM 2000 UNITS: 1000 INJECTION, SOLUTION INTRAVENOUS; SUBCUTANEOUS at 18:24

## 2017-01-01 RX ADMIN — HEPARIN SODIUM 5000 UNITS: 5000 INJECTION, SOLUTION INTRAVENOUS; SUBCUTANEOUS at 13:53

## 2017-01-01 RX ADMIN — PANTOPRAZOLE SODIUM 40 MG: 40 TABLET, DELAYED RELEASE ORAL at 06:44

## 2017-01-01 RX ADMIN — SENNOSIDES 8.6 MG: 8.6 TABLET, FILM COATED ORAL at 08:35

## 2017-01-01 RX ADMIN — FENTANYL CITRATE 50 MCG: 50 INJECTION, SOLUTION INTRAMUSCULAR; INTRAVENOUS at 09:51

## 2017-01-01 RX ADMIN — LIDOCAINE HYDROCHLORIDE 15 ML: 20 SOLUTION ORAL; TOPICAL at 22:39

## 2017-01-01 RX ADMIN — Medication 5000 ML: at 11:55

## 2017-01-01 RX ADMIN — RENAGEL 800 MG: 800 TABLET ORAL at 15:47

## 2017-01-01 RX ADMIN — METOPROLOL TARTRATE 25 MG: 25 TABLET ORAL at 09:07

## 2017-01-01 RX ADMIN — INSULIN GLARGINE 12 UNITS: 100 INJECTION, SOLUTION SUBCUTANEOUS at 23:00

## 2017-01-01 RX ADMIN — LANTHANUM CARBONATE 1000 MG: 500 TABLET, CHEWABLE ORAL at 08:17

## 2017-01-01 RX ADMIN — ACETAMINOPHEN 650 MG: 325 TABLET, FILM COATED ORAL at 08:12

## 2017-01-01 RX ADMIN — DOCUSATE SODIUM 100 MG: 100 CAPSULE, LIQUID FILLED ORAL at 17:12

## 2017-01-01 RX ADMIN — IOHEXOL 100 ML: 350 INJECTION, SOLUTION INTRAVENOUS at 13:08

## 2017-01-01 RX ADMIN — POTASSIUM CHLORIDE: 2 INJECTION, SOLUTION, CONCENTRATE INTRAVENOUS at 23:25

## 2017-01-01 RX ADMIN — RANOLAZINE 500 MG: 500 TABLET, FILM COATED, EXTENDED RELEASE ORAL at 21:01

## 2017-01-01 RX ADMIN — CLOPIDOGREL BISULFATE 75 MG: 75 TABLET ORAL at 08:38

## 2017-01-01 RX ADMIN — CALCIUM ACETATE 1334 MG: 667 CAPSULE ORAL at 08:17

## 2017-01-01 RX ADMIN — Medication 1 APPLICATION: at 11:58

## 2017-01-01 RX ADMIN — PHENYLEPHRINE HYDROCHLORIDE 100 MCG: 10 INJECTION INTRAVENOUS at 12:51

## 2017-01-01 RX ADMIN — GABAPENTIN 100 MG: 100 CAPSULE ORAL at 21:53

## 2017-01-01 RX ADMIN — LANTHANUM CARBONATE 1000 MG: 500 TABLET, CHEWABLE ORAL at 12:30

## 2017-01-01 RX ADMIN — SODIUM BICARBONATE 50 MEQ: 84 INJECTION, SOLUTION INTRAVENOUS at 02:02

## 2017-01-01 RX ADMIN — ALBUMIN HUMAN 25 G: 0.25 SOLUTION INTRAVENOUS at 15:53

## 2017-01-01 RX ADMIN — MIDODRINE HYDROCHLORIDE 5 MG: 5 TABLET ORAL at 18:15

## 2017-01-01 RX ADMIN — GENTAMICIN SULFATE: 1 OINTMENT TOPICAL at 00:00

## 2017-01-01 RX ADMIN — SENNOSIDES 8.6 MG: 8.6 TABLET, FILM COATED ORAL at 08:23

## 2017-01-01 RX ADMIN — HEPARIN SODIUM 4000 UNITS: 1000 INJECTION, SOLUTION INTRAVENOUS; SUBCUTANEOUS at 12:57

## 2017-01-01 RX ADMIN — LEVOTHYROXINE SODIUM 50 MCG: 50 TABLET ORAL at 05:23

## 2017-01-01 RX ADMIN — TICAGRELOR 90 MG: 90 TABLET ORAL at 23:09

## 2017-01-01 RX ADMIN — TICAGRELOR 90 MG: 90 TABLET ORAL at 08:39

## 2017-01-01 RX ADMIN — METRONIDAZOLE 500 MG: 500 INJECTION, SOLUTION INTRAVENOUS at 21:26

## 2017-01-01 RX ADMIN — TICAGRELOR 90 MG: 90 TABLET ORAL at 08:14

## 2017-01-01 RX ADMIN — LEVOTHYROXINE SODIUM 50 MCG: 50 TABLET ORAL at 06:10

## 2017-01-01 RX ADMIN — ALBUMIN HUMAN 25 G: 0.25 SOLUTION INTRAVENOUS at 17:31

## 2017-01-01 RX ADMIN — PANTOPRAZOLE SODIUM 40 MG: 40 INJECTION, POWDER, FOR SOLUTION INTRAVENOUS at 09:42

## 2017-01-01 RX ADMIN — LANTHANUM CARBONATE 1000 MG: 500 TABLET, CHEWABLE ORAL at 12:37

## 2017-01-01 RX ADMIN — EPINEPHRINE 1 MG: 0.1 INJECTION, SOLUTION ENDOTRACHEAL; INTRACARDIAC; INTRAVENOUS at 01:58

## 2017-01-01 RX ADMIN — RANOLAZINE 500 MG: 500 TABLET, FILM COATED, EXTENDED RELEASE ORAL at 08:44

## 2017-01-01 RX ADMIN — ALBUMIN HUMAN 12.5 G: 0.25 SOLUTION INTRAVENOUS at 23:13

## 2017-01-01 RX ADMIN — SODIUM CHLORIDE 250 ML: 0.9 INJECTION, SOLUTION INTRAVENOUS at 23:13

## 2017-01-01 RX ADMIN — GABAPENTIN 100 MG: 100 CAPSULE ORAL at 08:23

## 2017-01-01 RX ADMIN — MAGNESIUM SULFATE HEPTAHYDRATE 2 G: 40 INJECTION, SOLUTION INTRAVENOUS at 19:51

## 2017-01-01 RX ADMIN — LANTHANUM CARBONATE 1000 MG: 500 TABLET, CHEWABLE ORAL at 17:49

## 2017-01-01 RX ADMIN — LACTULOSE 20 G: 20 SOLUTION ORAL at 23:09

## 2017-01-01 RX ADMIN — LANTHANUM CARBONATE 1000 MG: 500 TABLET, CHEWABLE ORAL at 17:03

## 2017-01-01 RX ADMIN — SODIUM BICARBONATE 50 MEQ: 84 INJECTION, SOLUTION INTRAVENOUS at 01:50

## 2017-01-01 RX ADMIN — LANTHANUM CARBONATE 1000 MG: 500 TABLET, CHEWABLE ORAL at 16:43

## 2017-01-01 RX ADMIN — PANTOPRAZOLE SODIUM 20 MG: 20 TABLET, DELAYED RELEASE ORAL at 05:17

## 2017-01-01 RX ADMIN — FENTANYL CITRATE 50 MCG: 50 INJECTION, SOLUTION INTRAMUSCULAR; INTRAVENOUS at 14:46

## 2017-01-01 RX ADMIN — INSULIN GLARGINE 12 UNITS: 100 INJECTION, SOLUTION SUBCUTANEOUS at 23:09

## 2017-01-01 RX ADMIN — FEBUXOSTAT 40 MG: 40 TABLET, FILM COATED ORAL at 08:25

## 2017-01-01 RX ADMIN — INSULIN LISPRO 2 UNITS: 100 INJECTION, SOLUTION INTRAVENOUS; SUBCUTANEOUS at 06:23

## 2017-01-01 RX ADMIN — CALCIUM CHLORIDE 1 G: 100 INJECTION PARENTERAL at 01:48

## 2017-01-01 RX ADMIN — MELATONIN TAB 3 MG 3 MG: 3 TAB at 01:45

## 2017-01-01 RX ADMIN — Medication 5000 ML: at 02:16

## 2017-01-01 RX ADMIN — HEPARIN SODIUM 5000 UNITS: 5000 INJECTION, SOLUTION INTRAVENOUS; SUBCUTANEOUS at 07:52

## 2017-01-01 RX ADMIN — RENAGEL 400 MG: 400 TABLET ORAL at 10:00

## 2017-01-01 RX ADMIN — PANTOPRAZOLE SODIUM 40 MG: 40 TABLET, DELAYED RELEASE ORAL at 05:44

## 2017-01-01 RX ADMIN — PANTOPRAZOLE SODIUM 40 MG: 40 TABLET, DELAYED RELEASE ORAL at 06:02

## 2017-01-01 RX ADMIN — ONDANSETRON 4 MG: 2 INJECTION INTRAMUSCULAR; INTRAVENOUS at 17:24

## 2017-01-01 RX ADMIN — VALSARTAN 160 MG: 160 TABLET, FILM COATED ORAL at 23:00

## 2017-01-01 RX ADMIN — HEPARIN SODIUM AND DEXTROSE 19.1 UNITS/KG/HR: 10000; 5 INJECTION INTRAVENOUS at 14:55

## 2017-01-01 RX ADMIN — PRAVASTATIN SODIUM 80 MG: 40 TABLET ORAL at 15:46

## 2017-01-01 RX ADMIN — GABAPENTIN 100 MG: 100 CAPSULE ORAL at 17:43

## 2017-01-01 RX ADMIN — RENAGEL 400 MG: 400 TABLET ORAL at 08:49

## 2017-01-01 RX ADMIN — LANTHANUM CARBONATE 1000 MG: 500 TABLET, CHEWABLE ORAL at 09:04

## 2017-01-01 RX ADMIN — HEPARIN SODIUM 5000 UNITS: 5000 INJECTION, SOLUTION INTRAVENOUS; SUBCUTANEOUS at 05:44

## 2017-01-01 RX ADMIN — INSULIN GLARGINE 12 UNITS: 100 INJECTION, SOLUTION SUBCUTANEOUS at 08:17

## 2017-01-01 RX ADMIN — INSULIN LISPRO 2 UNITS: 100 INJECTION, SOLUTION INTRAVENOUS; SUBCUTANEOUS at 12:26

## 2017-01-01 RX ADMIN — IOHEXOL 32 ML: 350 INJECTION, SOLUTION INTRAVENOUS at 11:08

## 2017-01-01 RX ADMIN — INSULIN GLARGINE 12 UNITS: 100 INJECTION, SOLUTION SUBCUTANEOUS at 08:33

## 2017-01-01 RX ADMIN — DOCUSATE SODIUM 100 MG: 100 CAPSULE, LIQUID FILLED ORAL at 09:36

## 2017-01-01 RX ADMIN — MAGNESIUM SULFATE HEPTAHYDRATE 2 G: 40 INJECTION, SOLUTION INTRAVENOUS at 05:50

## 2017-01-01 RX ADMIN — ATROPINE SULFATE 0.4 MG: 0.1 INJECTION PARENTERAL at 19:17

## 2017-01-01 RX ADMIN — SENNOSIDES 8.6 MG: 8.6 TABLET, FILM COATED ORAL at 09:09

## 2017-01-01 RX ADMIN — SODIUM CHLORIDE 250 ML: 0.9 INJECTION, SOLUTION INTRAVENOUS at 14:22

## 2017-01-01 RX ADMIN — EPINEPHRINE 1 MG: 0.1 INJECTION, SOLUTION ENDOTRACHEAL; INTRACARDIAC; INTRAVENOUS at 02:19

## 2017-01-01 RX ADMIN — ACETAMINOPHEN 650 MG: 325 TABLET, FILM COATED ORAL at 20:46

## 2017-01-01 RX ADMIN — INSULIN GLARGINE 12 UNITS: 100 INJECTION, SOLUTION SUBCUTANEOUS at 08:12

## 2017-01-01 RX ADMIN — GENTAMICIN SULFATE: 1 OINTMENT TOPICAL at 21:34

## 2017-01-01 RX ADMIN — ACETAMINOPHEN 650 MG: 325 TABLET, FILM COATED ORAL at 05:49

## 2017-01-01 RX ADMIN — Medication 5000 ML: at 02:01

## 2017-01-01 RX ADMIN — DOCUSATE SODIUM 100 MG: 100 CAPSULE, LIQUID FILLED ORAL at 09:07

## 2017-01-01 RX ADMIN — INSULIN GLARGINE 6 UNITS: 100 INJECTION, SOLUTION SUBCUTANEOUS at 23:23

## 2017-01-01 RX ADMIN — ASPIRIN 81 MG 81 MG: 81 TABLET ORAL at 08:12

## 2017-01-01 RX ADMIN — DOCUSATE SODIUM 100 MG: 100 CAPSULE, LIQUID FILLED ORAL at 17:04

## 2017-01-01 RX ADMIN — EPINEPHRINE 1 MG: 0.1 INJECTION, SOLUTION ENDOTRACHEAL; INTRACARDIAC; INTRAVENOUS at 02:10

## 2017-01-01 RX ADMIN — HEPARIN SODIUM 5000 UNITS: 5000 INJECTION, SOLUTION INTRAVENOUS; SUBCUTANEOUS at 14:16

## 2017-01-01 RX ADMIN — TICAGRELOR 90 MG: 90 TABLET ORAL at 08:18

## 2017-01-01 RX ADMIN — MIDAZOLAM HYDROCHLORIDE 2 MG: 1 INJECTION, SOLUTION INTRAMUSCULAR; INTRAVENOUS at 13:55

## 2017-01-01 RX ADMIN — HEPARIN SODIUM 5000 UNITS: 5000 INJECTION, SOLUTION INTRAVENOUS; SUBCUTANEOUS at 22:13

## 2017-01-01 RX ADMIN — RENAGEL 400 MG: 400 TABLET ORAL at 17:49

## 2017-01-01 RX ADMIN — FEBUXOSTAT 40 MG: 40 TABLET, FILM COATED ORAL at 08:26

## 2017-01-01 RX ADMIN — OXYCODONE HYDROCHLORIDE AND ACETAMINOPHEN 1 TABLET: 5; 325 TABLET ORAL at 11:29

## 2017-01-01 RX ADMIN — ACETAMINOPHEN 975 MG: 325 TABLET, FILM COATED ORAL at 21:08

## 2017-01-01 RX ADMIN — PANTOPRAZOLE SODIUM 40 MG: 40 TABLET, DELAYED RELEASE ORAL at 05:56

## 2017-01-01 RX ADMIN — GABAPENTIN 100 MG: 100 CAPSULE ORAL at 08:38

## 2017-01-01 RX ADMIN — LIDOCAINE HYDROCHLORIDE 300 MG: 10 INJECTION, SOLUTION EPIDURAL; INFILTRATION; INTRACAUDAL; PERINEURAL at 20:45

## 2017-01-01 RX ADMIN — REGADENOSON 0.4 MG: 0.08 INJECTION, SOLUTION INTRAVENOUS at 09:58

## 2017-01-01 RX ADMIN — RENAGEL 400 MG: 400 TABLET ORAL at 14:19

## 2017-01-01 RX ADMIN — Medication 1 APPLICATION: at 09:34

## 2017-01-01 RX ADMIN — LORAZEPAM 0.5 MG: 2 INJECTION INTRAMUSCULAR; INTRAVENOUS at 15:46

## 2017-01-01 RX ADMIN — RANOLAZINE 500 MG: 500 TABLET, FILM COATED, EXTENDED RELEASE ORAL at 23:55

## 2017-01-01 RX ADMIN — MIDODRINE HYDROCHLORIDE 10 MG: 5 TABLET ORAL at 11:52

## 2017-01-01 RX ADMIN — FEBUXOSTAT 40 MG: 40 TABLET, FILM COATED ORAL at 08:42

## 2017-01-01 RX ADMIN — ACETAMINOPHEN 650 MG: 325 TABLET, FILM COATED ORAL at 20:32

## 2017-01-01 RX ADMIN — GABAPENTIN 100 MG: 100 CAPSULE ORAL at 16:52

## 2017-01-01 RX ADMIN — HEPARIN SODIUM 5000 UNITS: 5000 INJECTION, SOLUTION INTRAVENOUS; SUBCUTANEOUS at 13:49

## 2017-01-01 RX ADMIN — FENTANYL CITRATE 50 MCG: 50 INJECTION, SOLUTION INTRAMUSCULAR; INTRAVENOUS at 12:37

## 2017-01-01 RX ADMIN — LEVOTHYROXINE SODIUM 50 MCG: 50 TABLET ORAL at 05:02

## 2017-01-01 RX ADMIN — HEPARIN SODIUM 5000 UNITS: 5000 INJECTION, SOLUTION INTRAVENOUS; SUBCUTANEOUS at 05:16

## 2017-01-01 RX ADMIN — ACETAMINOPHEN 975 MG: 325 TABLET, FILM COATED ORAL at 05:26

## 2017-01-01 RX ADMIN — Medication 5000 ML: at 15:25

## 2017-01-01 RX ADMIN — HEPARIN SODIUM 5000 UNITS: 5000 INJECTION, SOLUTION INTRAVENOUS; SUBCUTANEOUS at 21:58

## 2017-01-01 RX ADMIN — Medication 60 ML/HR: at 20:44

## 2017-01-01 RX ADMIN — Medication: at 17:50

## 2017-01-01 RX ADMIN — MIDODRINE HYDROCHLORIDE 5 MG: 5 TABLET ORAL at 23:09

## 2017-01-01 RX ADMIN — ACETAMINOPHEN 650 MG: 325 TABLET, FILM COATED ORAL at 15:47

## 2017-01-01 RX ADMIN — CALCIUM CHLORIDE 1 G: 100 INJECTION PARENTERAL at 02:04

## 2017-01-01 RX ADMIN — LANTHANUM CARBONATE 1000 MG: 500 TABLET, CHEWABLE ORAL at 12:18

## 2017-01-01 RX ADMIN — CALCIUM GLUCONATE 2 G: 94 INJECTION, SOLUTION INTRAVENOUS at 18:24

## 2017-01-01 RX ADMIN — ATROPINE SULFATE 0.5 MG: 0.1 INJECTION, SOLUTION ENDOTRACHEAL; INTRAMUSCULAR; INTRAVENOUS; SUBCUTANEOUS at 01:39

## 2017-01-01 RX ADMIN — INSULIN GLARGINE 12 UNITS: 100 INJECTION, SOLUTION SUBCUTANEOUS at 08:39

## 2017-01-01 RX ADMIN — RANOLAZINE 500 MG: 500 TABLET, FILM COATED, EXTENDED RELEASE ORAL at 09:09

## 2017-01-01 RX ADMIN — TICAGRELOR 90 MG: 90 TABLET ORAL at 08:17

## 2017-01-01 RX ADMIN — SENNOSIDES 8.6 MG: 8.6 TABLET, FILM COATED ORAL at 09:36

## 2017-01-01 RX ADMIN — LACTULOSE 20 G: 20 SOLUTION ORAL at 17:15

## 2017-01-01 RX ADMIN — TICAGRELOR 180 MG: 90 TABLET ORAL at 10:25

## 2017-01-01 RX ADMIN — IOHEXOL 100 ML: 350 INJECTION, SOLUTION INTRAVENOUS at 15:13

## 2017-01-01 RX ADMIN — SODIUM CHLORIDE 250 ML: 0.9 INJECTION, SOLUTION INTRAVENOUS at 14:37

## 2017-01-01 RX ADMIN — ALBUMIN HUMAN 25 G: 0.25 SOLUTION INTRAVENOUS at 23:48

## 2017-01-01 RX ADMIN — FEBUXOSTAT 40 MG: 40 TABLET, FILM COATED ORAL at 15:19

## 2017-01-01 RX ADMIN — INSULIN LISPRO 1 UNITS: 100 INJECTION, SOLUTION INTRAVENOUS; SUBCUTANEOUS at 08:18

## 2017-01-01 RX ADMIN — LEVOTHYROXINE SODIUM 50 MCG: 50 TABLET ORAL at 05:42

## 2017-01-01 RX ADMIN — ALBUMIN, HUMAN INJ 5% 12.5 G: 5 SOLUTION at 04:25

## 2017-01-01 RX ADMIN — SODIUM CHLORIDE 500 ML: 0.9 INJECTION, SOLUTION INTRAVENOUS at 15:46

## 2017-01-01 RX ADMIN — Medication 1 APPLICATION: at 08:02

## 2017-01-01 RX ADMIN — EPINEPHRINE 10 MCG/MIN: 1 INJECTION PARENTERAL at 01:58

## 2017-01-01 RX ADMIN — BIVALIRUDIN 0.25 MG/KG/HR: 250 INJECTION, POWDER, LYOPHILIZED, FOR SOLUTION INTRAVENOUS at 12:42

## 2017-01-01 RX ADMIN — GENTAMICIN SULFATE: 1 OINTMENT TOPICAL at 21:58

## 2017-01-01 RX ADMIN — RENAGEL 400 MG: 400 TABLET ORAL at 17:05

## 2017-01-01 RX ADMIN — GABAPENTIN 100 MG: 100 CAPSULE ORAL at 09:01

## 2017-01-01 RX ADMIN — HEPARIN SODIUM 5000 UNITS: 5000 INJECTION, SOLUTION INTRAVENOUS; SUBCUTANEOUS at 16:40

## 2017-01-01 RX ADMIN — MIDODRINE HYDROCHLORIDE 5 MG: 5 TABLET ORAL at 17:43

## 2017-01-01 RX ADMIN — FEBUXOSTAT 40 MG: 40 TABLET, FILM COATED ORAL at 08:17

## 2017-01-01 RX ADMIN — Medication 1 APPLICATION: at 19:51

## 2017-01-01 RX ADMIN — SODIUM BICARBONATE 50 MEQ: 84 INJECTION, SOLUTION INTRAVENOUS at 02:10

## 2017-01-01 RX ADMIN — HEPARIN SODIUM 4000 UNITS: 1000 INJECTION, SOLUTION INTRAVENOUS; SUBCUTANEOUS at 04:26

## 2017-01-01 RX ADMIN — Medication 1 APPLICATION: at 18:04

## 2017-01-01 RX ADMIN — RENAGEL 400 MG: 400 TABLET ORAL at 12:26

## 2017-01-01 RX ADMIN — HEPARIN SODIUM 5000 UNITS: 5000 INJECTION, SOLUTION INTRAVENOUS; SUBCUTANEOUS at 23:09

## 2017-01-01 RX ADMIN — NITROGLYCERIN 200 MCG: 20 INJECTION INTRAVENOUS at 10:02

## 2017-01-01 RX ADMIN — CLOPIDOGREL BISULFATE 75 MG: 75 TABLET ORAL at 08:12

## 2017-01-01 RX ADMIN — MIDAZOLAM HYDROCHLORIDE 1 MG: 1 INJECTION, SOLUTION INTRAMUSCULAR; INTRAVENOUS at 10:59

## 2017-01-01 RX ADMIN — CALCIUM GLUCONATE 1 G: 94 INJECTION, SOLUTION INTRAVENOUS at 04:58

## 2017-01-01 RX ADMIN — METHYLPREDNISOLONE 24 MG: 16 TABLET ORAL at 11:43

## 2017-01-01 RX ADMIN — METOCLOPRAMIDE 10 MG: 5 INJECTION, SOLUTION INTRAMUSCULAR; INTRAVENOUS at 13:54

## 2017-01-01 RX ADMIN — VALSARTAN 40 MG: 80 TABLET ORAL at 12:23

## 2017-01-01 RX ADMIN — HEPARIN SODIUM 5000 UNITS: 5000 INJECTION, SOLUTION INTRAVENOUS; SUBCUTANEOUS at 05:26

## 2017-01-01 RX ADMIN — LACTULOSE 30 G: 20 SOLUTION ORAL at 21:33

## 2017-01-01 RX ADMIN — ETOMIDATE 20 MG: 2 INJECTION, SOLUTION INTRAVENOUS at 01:43

## 2017-01-01 RX ADMIN — INSULIN LISPRO 2 UNITS: 100 INJECTION, SOLUTION INTRAVENOUS; SUBCUTANEOUS at 08:28

## 2017-01-01 RX ADMIN — GABAPENTIN 100 MG: 100 CAPSULE ORAL at 15:47

## 2017-01-01 RX ADMIN — Medication 1 APPLICATION: at 16:53

## 2017-01-01 RX ADMIN — HEPARIN SODIUM 5000 UNITS: 5000 INJECTION, SOLUTION INTRAVENOUS; SUBCUTANEOUS at 21:11

## 2017-01-01 RX ADMIN — PRASUGREL HYDROCHLORIDE 10 MG: 10 TABLET, FILM COATED ORAL at 10:41

## 2017-01-01 RX ADMIN — LIDOCAINE HYDROCHLORIDE 1 ML: 10 INJECTION, SOLUTION EPIDURAL; INFILTRATION; INTRACAUDAL; PERINEURAL at 21:26

## 2017-01-01 RX ADMIN — LEVOTHYROXINE SODIUM 50 MCG: 50 TABLET ORAL at 05:25

## 2017-01-01 RX ADMIN — CINACALCET HYDROCHLORIDE 30 MG: 30 TABLET, COATED ORAL at 18:15

## 2017-01-01 RX ADMIN — ONDANSETRON 4 MG: 2 INJECTION INTRAMUSCULAR; INTRAVENOUS at 09:03

## 2017-01-01 RX ADMIN — PERFLUTREN 1 ML/MIN: 6.52 INJECTION, SUSPENSION INTRAVENOUS at 11:11

## 2017-01-01 RX ADMIN — HEPARIN SODIUM 4000 UNITS: 1000 INJECTION, SOLUTION INTRAVENOUS; SUBCUTANEOUS at 23:17

## 2017-01-01 RX ADMIN — CALCIUM GLUCONATE 1 G: 94 INJECTION, SOLUTION INTRAVENOUS at 14:51

## 2017-01-01 RX ADMIN — GABAPENTIN 100 MG: 100 CAPSULE ORAL at 21:01

## 2017-01-01 RX ADMIN — Medication: at 08:40

## 2017-01-01 RX ADMIN — VERAPAMIL HYDROCHLORIDE 1.25 MG: 2.5 INJECTION, SOLUTION INTRAVENOUS at 10:02

## 2017-01-01 RX ADMIN — SODIUM CHLORIDE 50 ML/HR: 0.9 INJECTION, SOLUTION INTRAVENOUS at 17:45

## 2017-01-01 RX ADMIN — ATORVASTATIN CALCIUM 40 MG: 40 TABLET, FILM COATED ORAL at 22:13

## 2017-01-01 RX ADMIN — INSULIN LISPRO 4 UNITS: 100 INJECTION, SOLUTION INTRAVENOUS; SUBCUTANEOUS at 22:22

## 2017-01-01 RX ADMIN — ZOLPIDEM TARTRATE 5 MG: 5 TABLET, FILM COATED ORAL at 00:59

## 2017-01-01 RX ADMIN — LANTHANUM CARBONATE 1000 MG: 500 TABLET, CHEWABLE ORAL at 17:12

## 2017-01-01 RX ADMIN — ALBUMIN HUMAN 12.5 G: 0.25 SOLUTION INTRAVENOUS at 03:48

## 2017-01-01 RX ADMIN — INSULIN GLARGINE 12 UNITS: 100 INJECTION, SOLUTION SUBCUTANEOUS at 09:06

## 2017-01-01 RX ADMIN — MELATONIN TAB 3 MG 3 MG: 3 TAB at 22:30

## 2017-01-01 RX ADMIN — Medication 1 APPLICATION: at 08:18

## 2017-01-01 RX ADMIN — CALCIUM GLUCONATE 1 G: 94 INJECTION, SOLUTION INTRAVENOUS at 05:56

## 2017-01-01 RX ADMIN — LANTHANUM CARBONATE 1000 MG: 500 TABLET, CHEWABLE ORAL at 12:26

## 2017-01-01 RX ADMIN — PRAVASTATIN SODIUM 80 MG: 80 TABLET ORAL at 17:43

## 2017-01-01 RX ADMIN — IOHEXOL 130 ML: 350 INJECTION, SOLUTION INTRAVENOUS at 11:03

## 2017-01-01 RX ADMIN — ONDANSETRON 4 MG: 2 INJECTION INTRAMUSCULAR; INTRAVENOUS at 03:09

## 2017-01-01 RX ADMIN — Medication 5000 ML: at 22:01

## 2017-01-01 RX ADMIN — HEPARIN SODIUM 4000 UNITS: 1000 INJECTION, SOLUTION INTRAVENOUS; SUBCUTANEOUS at 16:58

## 2017-01-01 RX ADMIN — LACTULOSE 20 G: 20 SOLUTION ORAL at 22:56

## 2017-01-01 RX ADMIN — RENAGEL 400 MG: 400 TABLET ORAL at 08:32

## 2017-01-01 RX ADMIN — PANTOPRAZOLE SODIUM 40 MG: 40 TABLET, DELAYED RELEASE ORAL at 05:49

## 2017-01-01 RX ADMIN — LANTHANUM CARBONATE 1000 MG: 500 TABLET, CHEWABLE ORAL at 17:42

## 2017-01-01 RX ADMIN — FEBUXOSTAT 40 MG: 40 TABLET, FILM COATED ORAL at 08:43

## 2017-01-01 RX ADMIN — RENAGEL 400 MG: 400 TABLET ORAL at 18:25

## 2017-01-01 RX ADMIN — NOREPINEPHRINE BITARTRATE: 1 INJECTION INTRAVENOUS at 19:00

## 2017-01-01 RX ADMIN — NOREPINEPHRINE BITARTRATE 7 MCG/MIN: 1 INJECTION INTRAVENOUS at 18:55

## 2017-01-01 RX ADMIN — HEPARIN SODIUM 8000 UNITS: 1000 INJECTION INTRAVENOUS; SUBCUTANEOUS at 10:28

## 2017-01-01 RX ADMIN — SODIUM BICARBONATE 50 MEQ: 84 INJECTION, SOLUTION INTRAVENOUS at 01:52

## 2017-01-01 RX ADMIN — CALCIUM GLUCONATE 1 G: 94 INJECTION, SOLUTION INTRAVENOUS at 17:03

## 2017-01-01 RX ADMIN — NOREPINEPHRINE BITARTRATE 4000 MCG: 1 INJECTION INTRAVENOUS at 17:00

## 2017-01-01 RX ADMIN — LEVOTHYROXINE SODIUM 50 MCG: 50 TABLET ORAL at 05:13

## 2017-01-01 RX ADMIN — HEPARIN SODIUM 8000 UNITS: 1000 INJECTION INTRAVENOUS; SUBCUTANEOUS at 10:02

## 2017-01-01 RX ADMIN — Medication 5000 ML: at 00:43

## 2017-01-01 RX ADMIN — GENTAMICIN SULFATE 1 APPLICATION: 1 OINTMENT TOPICAL at 22:08

## 2017-01-01 RX ADMIN — EPINEPHRINE 1 MG: 0.1 INJECTION, SOLUTION ENDOTRACHEAL; INTRACARDIAC; INTRAVENOUS at 01:55

## 2017-01-01 RX ADMIN — ASPIRIN 81 MG 81 MG: 81 TABLET ORAL at 08:17

## 2017-01-01 RX ADMIN — ASPIRIN 81 MG: 81 TABLET, CHEWABLE ORAL at 09:07

## 2017-01-01 RX ADMIN — SODIUM CHLORIDE 250 ML: 0.9 INJECTION, SOLUTION INTRAVENOUS at 22:59

## 2017-01-01 RX ADMIN — PANTOPRAZOLE SODIUM 40 MG: 40 TABLET, DELAYED RELEASE ORAL at 06:03

## 2017-01-01 RX ADMIN — GABAPENTIN 100 MG: 100 CAPSULE ORAL at 08:40

## 2017-01-01 RX ADMIN — LIDOCAINE HYDROCHLORIDE 10 ML: 10 INJECTION, SOLUTION INFILTRATION; PERINEURAL at 12:41

## 2017-01-01 RX ADMIN — PANTOPRAZOLE SODIUM 20 MG: 20 TABLET, DELAYED RELEASE ORAL at 05:26

## 2017-01-01 RX ADMIN — RANOLAZINE 500 MG: 500 TABLET, FILM COATED, EXTENDED RELEASE ORAL at 21:24

## 2017-01-01 RX ADMIN — ACETAMINOPHEN 975 MG: 325 TABLET, FILM COATED ORAL at 18:25

## 2017-01-01 RX ADMIN — LEVOTHYROXINE SODIUM 50 MCG: 50 TABLET ORAL at 06:46

## 2017-01-01 RX ADMIN — LANTHANUM CARBONATE 1000 MG: 500 TABLET, CHEWABLE ORAL at 16:30

## 2017-01-01 RX ADMIN — RANOLAZINE 500 MG: 500 TABLET, FILM COATED, EXTENDED RELEASE ORAL at 08:12

## 2017-01-01 RX ADMIN — TICAGRELOR 90 MG: 90 TABLET ORAL at 22:02

## 2017-01-01 RX ADMIN — Medication 1 APPLICATION: at 17:16

## 2017-01-01 RX ADMIN — ASPIRIN 81 MG 81 MG: 81 TABLET ORAL at 08:40

## 2017-01-01 RX ADMIN — RENAGEL 800 MG: 800 TABLET ORAL at 11:03

## 2017-01-01 RX ADMIN — SENNOSIDES 8.6 MG: 8.6 TABLET, FILM COATED ORAL at 08:13

## 2017-01-01 RX ADMIN — PHENYLEPHRINE HYDROCHLORIDE 100 MCG: 10 INJECTION INTRAVENOUS at 13:01

## 2017-01-01 RX ADMIN — LIDOCAINE HYDROCHLORIDE 1 ML: 10 INJECTION, SOLUTION INFILTRATION; PERINEURAL at 14:50

## 2017-01-01 RX ADMIN — RENAGEL 800 MG: 800 TABLET ORAL at 17:02

## 2017-01-01 RX ADMIN — LEVOTHYROXINE SODIUM 50 MCG: 50 TABLET ORAL at 05:16

## 2017-01-01 RX ADMIN — Medication 1 APPLICATION: at 14:00

## 2017-01-01 RX ADMIN — SENNOSIDES 8.6 MG: 8.6 TABLET, FILM COATED ORAL at 08:12

## 2017-01-01 RX ADMIN — CALCIUM GLUCONATE 1 G: 94 INJECTION, SOLUTION INTRAVENOUS at 06:33

## 2017-01-01 RX ADMIN — GABAPENTIN 100 MG: 100 CAPSULE ORAL at 20:32

## 2017-01-01 RX ADMIN — Medication 5000 ML: at 00:42

## 2017-01-01 RX ADMIN — LEVOTHYROXINE SODIUM 100 MCG: 100 TABLET ORAL at 23:05

## 2017-01-01 RX ADMIN — CALCIUM ACETATE 1334 MG: 667 CAPSULE ORAL at 08:13

## 2017-01-01 RX ADMIN — LIDOCAINE HYDROCHLORIDE 40 MG: 20 INJECTION, SOLUTION INTRAVENOUS at 13:55

## 2017-01-01 RX ADMIN — LANTHANUM CARBONATE 1000 MG: 500 TABLET, CHEWABLE ORAL at 16:59

## 2017-01-01 RX ADMIN — TICAGRELOR 90 MG: 90 TABLET ORAL at 21:11

## 2017-01-01 RX ADMIN — Medication 1 APPLICATION: at 17:08

## 2017-01-01 RX ADMIN — POLYETHYLENE GLYCOL 3350 17 G: 17 POWDER, FOR SOLUTION ORAL at 17:12

## 2017-01-01 RX ADMIN — WATER 100 MG: 1 INJECTION INTRAMUSCULAR; INTRAVENOUS; SUBCUTANEOUS at 02:17

## 2017-01-01 RX ADMIN — Medication 5000 ML: at 21:11

## 2017-01-01 RX ADMIN — LANTHANUM CARBONATE 1000 MG: 500 TABLET, CHEWABLE ORAL at 17:10

## 2017-01-01 RX ADMIN — TICAGRELOR 90 MG: 90 TABLET ORAL at 21:25

## 2017-01-01 RX ADMIN — RANOLAZINE 500 MG: 500 TABLET, FILM COATED, EXTENDED RELEASE ORAL at 08:18

## 2017-01-01 RX ADMIN — ASPIRIN 81 MG 81 MG: 81 TABLET ORAL at 08:34

## 2017-01-01 RX ADMIN — RENAGEL 400 MG: 400 TABLET ORAL at 17:10

## 2017-01-01 RX ADMIN — PANTOPRAZOLE SODIUM 40 MG: 40 TABLET, DELAYED RELEASE ORAL at 05:16

## 2017-01-01 RX ADMIN — PRAVASTATIN SODIUM 80 MG: 40 TABLET ORAL at 17:51

## 2017-01-01 RX ADMIN — SODIUM CHLORIDE 50 ML/HR: 0.9 INJECTION, SOLUTION INTRAVENOUS at 12:48

## 2017-01-01 RX ADMIN — SODIUM BICARBONATE 50 MEQ: 84 INJECTION, SOLUTION INTRAVENOUS at 02:08

## 2017-01-01 RX ADMIN — MIDODRINE HYDROCHLORIDE 5 MG: 5 TABLET ORAL at 22:56

## 2017-01-01 RX ADMIN — LORAZEPAM 0.5 MG: 2 INJECTION INTRAMUSCULAR at 13:45

## 2017-01-01 RX ADMIN — HEPARIN SODIUM 5000 UNITS: 5000 INJECTION, SOLUTION INTRAVENOUS; SUBCUTANEOUS at 13:46

## 2017-01-01 RX ADMIN — POTASSIUM CHLORIDE: 2 INJECTION, SOLUTION, CONCENTRATE INTRAVENOUS at 00:02

## 2017-01-01 RX ADMIN — HEPARIN SODIUM AND DEXTROSE 20 UNITS/KG/HR: 10000; 5 INJECTION INTRAVENOUS at 07:34

## 2017-01-01 RX ADMIN — GENTAMICIN SULFATE: 1 OINTMENT TOPICAL at 08:27

## 2017-01-01 RX ADMIN — HEPARIN SODIUM 2500 UNITS: 1000 INJECTION INTRAVENOUS; SUBCUTANEOUS at 10:56

## 2017-01-01 RX ADMIN — ONDANSETRON 4 MG: 2 INJECTION INTRAMUSCULAR; INTRAVENOUS at 10:06

## 2017-01-01 RX ADMIN — ACETAMINOPHEN 975 MG: 325 TABLET, FILM COATED ORAL at 23:05

## 2017-01-01 RX ADMIN — HEPARIN SODIUM 5000 UNITS: 5000 INJECTION, SOLUTION INTRAVENOUS; SUBCUTANEOUS at 13:50

## 2017-01-01 RX ADMIN — Medication 1 APPLICATION: at 10:17

## 2017-01-01 RX ADMIN — LANTHANUM CARBONATE 1000 MG: 500 TABLET, CHEWABLE ORAL at 07:47

## 2017-01-01 RX ADMIN — LANTHANUM CARBONATE 1000 MG: 500 TABLET, CHEWABLE ORAL at 16:52

## 2017-01-01 RX ADMIN — LANTHANUM CARBONATE 1000 MG: 500 TABLET, CHEWABLE ORAL at 08:13

## 2017-01-01 RX ADMIN — HEPARIN SODIUM 5000 UNITS: 5000 INJECTION, SOLUTION INTRAVENOUS; SUBCUTANEOUS at 23:00

## 2017-01-01 RX ADMIN — LIDOCAINE HYDROCHLORIDE 1 ML: 10 INJECTION, SOLUTION INFILTRATION; PERINEURAL at 10:00

## 2017-01-01 RX ADMIN — LANTHANUM CARBONATE 1000 MG: 500 TABLET, CHEWABLE ORAL at 12:39

## 2017-01-01 RX ADMIN — LORAZEPAM 0.5 MG: 2 INJECTION INTRAMUSCULAR; INTRAVENOUS at 13:45

## 2017-01-01 RX ADMIN — MIDODRINE HYDROCHLORIDE 5 MG: 5 TABLET ORAL at 10:00

## 2017-01-01 RX ADMIN — INSULIN LISPRO 1 UNITS: 100 INJECTION, SOLUTION INTRAVENOUS; SUBCUTANEOUS at 11:32

## 2017-01-01 RX ADMIN — GABAPENTIN 100 MG: 100 CAPSULE ORAL at 17:04

## 2017-01-01 RX ADMIN — METHYLPREDNISOLONE 16 MG: 16 TABLET ORAL at 08:26

## 2017-01-01 RX ADMIN — SODIUM BICARBONATE 50 MEQ: 84 INJECTION, SOLUTION INTRAVENOUS at 01:43

## 2017-01-01 RX ADMIN — LEVOTHYROXINE SODIUM 50 MCG: 50 TABLET ORAL at 05:44

## 2017-01-01 RX ADMIN — POTASSIUM CHLORIDE 40 MEQ: 1500 TABLET, EXTENDED RELEASE ORAL at 17:43

## 2017-01-01 RX ADMIN — LACTULOSE 30 G: 20 SOLUTION ORAL at 17:12

## 2017-01-01 RX ADMIN — DOCUSATE SODIUM 100 MG: 100 CAPSULE, LIQUID FILLED ORAL at 18:25

## 2017-01-01 RX ADMIN — GENTAMICIN SULFATE: 1 OINTMENT TOPICAL at 22:21

## 2017-01-01 RX ADMIN — IOHEXOL 100 ML: 350 INJECTION, SOLUTION INTRAVENOUS at 16:15

## 2017-01-01 RX ADMIN — GABAPENTIN 100 MG: 100 CAPSULE ORAL at 22:13

## 2017-01-01 RX ADMIN — GABAPENTIN 100 MG: 100 CAPSULE ORAL at 08:17

## 2017-01-01 RX ADMIN — OXYCODONE HYDROCHLORIDE AND ACETAMINOPHEN 1 TABLET: 5; 325 TABLET ORAL at 07:46

## 2017-01-01 RX ADMIN — MIDODRINE HYDROCHLORIDE 5 MG: 5 TABLET ORAL at 17:12

## 2017-01-01 RX ADMIN — INSULIN GLARGINE 12 UNITS: 100 INJECTION, SOLUTION SUBCUTANEOUS at 21:33

## 2017-01-01 RX ADMIN — MIDODRINE HYDROCHLORIDE 5 MG: 5 TABLET ORAL at 08:17

## 2017-01-01 RX ADMIN — FENTANYL CITRATE 100 MCG: 50 INJECTION, SOLUTION INTRAMUSCULAR; INTRAVENOUS at 13:55

## 2017-01-01 RX ADMIN — PANTOPRAZOLE SODIUM 40 MG: 40 TABLET, DELAYED RELEASE ORAL at 05:02

## 2017-01-01 RX ADMIN — GENTAMICIN SULFATE: 1 OINTMENT TOPICAL at 23:09

## 2017-01-01 RX ADMIN — GABAPENTIN 100 MG: 100 CAPSULE ORAL at 08:12

## 2017-01-01 RX ADMIN — PANTOPRAZOLE SODIUM 40 MG: 40 TABLET, DELAYED RELEASE ORAL at 05:13

## 2017-01-01 RX ADMIN — PANTOPRAZOLE SODIUM 20 MG: 20 TABLET, DELAYED RELEASE ORAL at 07:00

## 2017-01-01 RX ADMIN — MIDODRINE HYDROCHLORIDE 5 MG: 5 TABLET ORAL at 17:17

## 2017-01-01 RX ADMIN — HEPARIN SODIUM 5000 UNITS: 5000 INJECTION, SOLUTION INTRAVENOUS; SUBCUTANEOUS at 06:43

## 2017-01-01 RX ADMIN — CEFAZOLIN SODIUM 1000 MG: 1 SOLUTION INTRAVENOUS at 14:03

## 2017-01-01 RX ADMIN — PRASUGREL HYDROCHLORIDE 10 MG: 10 TABLET, FILM COATED ORAL at 08:25

## 2017-01-01 RX ADMIN — ACETAMINOPHEN 975 MG: 325 TABLET, FILM COATED ORAL at 17:04

## 2017-01-01 RX ADMIN — GENTAMICIN SULFATE: 1 OINTMENT TOPICAL at 23:14

## 2017-01-01 RX ADMIN — RENAGEL 400 MG: 400 TABLET ORAL at 16:30

## 2017-01-01 RX ADMIN — ASPIRIN 81 MG 81 MG: 81 TABLET ORAL at 09:08

## 2017-01-01 RX ADMIN — CALCIUM ACETATE 1334 MG: 667 CAPSULE ORAL at 11:23

## 2017-01-01 RX ADMIN — Medication 5000 ML: at 00:41

## 2017-01-01 RX ADMIN — HEPARIN SODIUM 5000 UNITS: 5000 INJECTION, SOLUTION INTRAVENOUS; SUBCUTANEOUS at 05:04

## 2017-01-01 RX ADMIN — RENAGEL 400 MG: 400 TABLET ORAL at 23:23

## 2017-01-01 RX ADMIN — Medication 5000 ML: at 02:17

## 2017-01-01 RX ADMIN — HEPARIN SODIUM AND DEXTROSE 11.1 UNITS/KG/HR: 10000; 5 INJECTION INTRAVENOUS at 18:48

## 2017-01-01 RX ADMIN — TICAGRELOR 90 MG: 90 TABLET ORAL at 08:26

## 2017-01-01 RX ADMIN — GABAPENTIN 100 MG: 100 CAPSULE ORAL at 20:46

## 2017-01-01 RX ADMIN — GENTAMICIN SULFATE: 1 OINTMENT TOPICAL at 08:48

## 2017-01-01 RX ADMIN — FENTANYL CITRATE 25 MCG: 50 INJECTION, SOLUTION INTRAMUSCULAR; INTRAVENOUS at 10:59

## 2017-01-01 RX ADMIN — INSULIN LISPRO 2 UNITS: 100 INJECTION, SOLUTION INTRAVENOUS; SUBCUTANEOUS at 23:11

## 2017-01-01 RX ADMIN — INSULIN GLARGINE 12 UNITS: 100 INJECTION, SOLUTION SUBCUTANEOUS at 08:53

## 2017-01-01 RX ADMIN — RANOLAZINE 500 MG: 500 TABLET, FILM COATED, EXTENDED RELEASE ORAL at 22:13

## 2017-01-01 RX ADMIN — LANTHANUM CARBONATE 1000 MG: 500 TABLET, CHEWABLE ORAL at 08:59

## 2017-01-01 RX ADMIN — LEVOTHYROXINE SODIUM 50 MCG: 50 TABLET ORAL at 06:03

## 2017-01-01 RX ADMIN — Medication 5000 ML: at 02:14

## 2017-01-01 RX ADMIN — MIDAZOLAM 2 MG: 1 INJECTION INTRAMUSCULAR; INTRAVENOUS at 14:45

## 2017-01-01 RX ADMIN — ASPIRIN 81 MG 324 MG: 81 TABLET ORAL at 22:36

## 2017-01-01 RX ADMIN — RENAGEL 400 MG: 400 TABLET ORAL at 12:16

## 2017-01-01 RX ADMIN — HEPARIN SODIUM AND DEXTROSE 28 UNITS/KG/HR: 10000; 5 INJECTION INTRAVENOUS at 12:21

## 2017-01-01 RX ADMIN — MIDAZOLAM HYDROCHLORIDE 2 MG: 1 INJECTION, SOLUTION INTRAMUSCULAR; INTRAVENOUS at 09:51

## 2017-01-01 RX ADMIN — LACTULOSE 20 G: 20 SOLUTION ORAL at 16:52

## 2017-01-01 RX ADMIN — RANOLAZINE 500 MG: 500 TABLET, FILM COATED, EXTENDED RELEASE ORAL at 21:33

## 2017-01-01 RX ADMIN — CALCIUM GLUCONATE 1 G: 94 INJECTION, SOLUTION INTRAVENOUS at 12:25

## 2017-01-01 RX ADMIN — GABAPENTIN 100 MG: 100 CAPSULE ORAL at 08:59

## 2017-01-01 RX ADMIN — RENAGEL 800 MG: 800 TABLET ORAL at 12:18

## 2017-01-01 RX ADMIN — GENTAMICIN SULFATE 1 APPLICATION: 1 OINTMENT TOPICAL at 23:15

## 2017-01-01 RX ADMIN — EPINEPHRINE 1 MG: 0.1 INJECTION, SOLUTION ENDOTRACHEAL; INTRACARDIAC; INTRAVENOUS at 02:07

## 2017-01-01 RX ADMIN — GENTAMICIN SULFATE 1 APPLICATION: 1 OINTMENT TOPICAL at 09:10

## 2017-01-01 RX ADMIN — LEVOTHYROXINE SODIUM 50 MCG: 50 TABLET ORAL at 05:56

## 2017-01-01 RX ADMIN — CLOPIDOGREL BISULFATE 75 MG: 75 TABLET ORAL at 09:07

## 2017-01-01 RX ADMIN — HEPARIN SODIUM 5000 UNITS: 5000 INJECTION, SOLUTION INTRAVENOUS; SUBCUTANEOUS at 15:27

## 2017-01-01 RX ADMIN — CALCIUM CHLORIDE 1 G: 100 INJECTION PARENTERAL at 01:51

## 2017-01-01 RX ADMIN — GABAPENTIN 100 MG: 100 CAPSULE ORAL at 08:13

## 2017-01-01 RX ADMIN — HEPARIN SODIUM 5000 UNITS: 5000 INJECTION, SOLUTION INTRAVENOUS; SUBCUTANEOUS at 21:54

## 2017-01-01 RX ADMIN — ACETAMINOPHEN 975 MG: 325 TABLET, FILM COATED ORAL at 05:17

## 2017-01-01 RX ADMIN — RENAGEL 800 MG: 800 TABLET ORAL at 08:39

## 2017-01-01 RX ADMIN — INSULIN LISPRO 1 UNITS: 100 INJECTION, SOLUTION INTRAVENOUS; SUBCUTANEOUS at 15:48

## 2017-01-01 RX ADMIN — EPINEPHRINE 1 MG: 0.1 INJECTION, SOLUTION ENDOTRACHEAL; INTRACARDIAC; INTRAVENOUS at 02:01

## 2017-01-01 RX ADMIN — CLOPIDOGREL 600 MG: 75 TABLET, FILM COATED ORAL at 10:12

## 2017-01-01 RX ADMIN — ALBUMIN HUMAN 12.5 G: 0.25 SOLUTION INTRAVENOUS at 03:22

## 2017-01-01 RX ADMIN — PANTOPRAZOLE SODIUM 20 MG: 20 TABLET, DELAYED RELEASE ORAL at 07:47

## 2017-01-01 RX ADMIN — LANTHANUM CARBONATE 1000 MG: 500 TABLET, CHEWABLE ORAL at 12:22

## 2017-01-01 RX ADMIN — HEPARIN SODIUM 4000 UNITS: 1000 INJECTION, SOLUTION INTRAVENOUS; SUBCUTANEOUS at 06:02

## 2017-01-01 RX ADMIN — GENTAMICIN SULFATE: 1 OINTMENT TOPICAL at 16:59

## 2017-01-01 RX ADMIN — CALCIUM ACETATE 1334 MG: 667 CAPSULE ORAL at 16:52

## 2017-01-01 RX ADMIN — INSULIN LISPRO 1 UNITS: 100 INJECTION, SOLUTION INTRAVENOUS; SUBCUTANEOUS at 18:04

## 2017-01-01 RX ADMIN — HYDROCORTISONE SODIUM SUCCINATE 100 MG: 100 INJECTION, POWDER, FOR SOLUTION INTRAMUSCULAR; INTRAVENOUS at 23:05

## 2017-01-01 RX ADMIN — TICAGRELOR 90 MG: 90 TABLET ORAL at 23:06

## 2017-01-01 RX ADMIN — PANTOPRAZOLE SODIUM 40 MG: 40 TABLET, DELAYED RELEASE ORAL at 06:10

## 2017-01-01 RX ADMIN — ACETAMINOPHEN 975 MG: 325 TABLET, FILM COATED ORAL at 13:30

## 2017-01-01 RX ADMIN — Medication 1 APPLICATION: at 09:09

## 2017-01-01 RX ADMIN — FEBUXOSTAT 40 MG: 40 TABLET, FILM COATED ORAL at 10:25

## 2017-01-01 RX ADMIN — HEPARIN SODIUM AND DEXTROSE 11.1 UNITS/KG/HR: 10000; 5 INJECTION INTRAVENOUS at 23:17

## 2017-01-01 RX ADMIN — EPINEPHRINE 1 MG: 0.1 INJECTION, SOLUTION ENDOTRACHEAL; INTRACARDIAC; INTRAVENOUS at 02:24

## 2017-01-01 RX ADMIN — ASPIRIN 324 MG: 81 TABLET, CHEWABLE ORAL at 09:17

## 2017-01-01 RX ADMIN — CALCIUM GLUCONATE 1 G: 94 INJECTION, SOLUTION INTRAVENOUS at 23:45

## 2017-01-01 RX ADMIN — GABAPENTIN 100 MG: 100 CAPSULE ORAL at 08:36

## 2017-01-01 RX ADMIN — GENTAMICIN SULFATE 1 APPLICATION: 1 OINTMENT TOPICAL at 23:55

## 2017-01-01 RX ADMIN — PANTOPRAZOLE SODIUM 40 MG: 40 TABLET, DELAYED RELEASE ORAL at 06:46

## 2017-01-01 RX ADMIN — HEPARIN SODIUM 5000 UNITS: 5000 INJECTION, SOLUTION INTRAVENOUS; SUBCUTANEOUS at 21:10

## 2017-01-01 RX ADMIN — INSULIN GLARGINE 12 UNITS: 100 INJECTION, SOLUTION SUBCUTANEOUS at 23:56

## 2017-01-01 RX ADMIN — ACETAMINOPHEN 650 MG: 325 TABLET, FILM COATED ORAL at 04:30

## 2017-01-01 RX ADMIN — SODIUM PHOSPHATE, MONOBASIC, MONOHYDRATE 6 MMOL: 276; 142 INJECTION, SOLUTION INTRAVENOUS at 11:19

## 2017-01-01 RX ADMIN — OXYMETAZOLINE HYDROCHLORIDE 2 SPRAY: 5 SPRAY NASAL at 00:13

## 2017-01-01 RX ADMIN — RANOLAZINE 500 MG: 500 TABLET, FILM COATED, EXTENDED RELEASE ORAL at 08:50

## 2017-01-01 RX ADMIN — ASPIRIN 81 MG 81 MG: 81 TABLET ORAL at 08:25

## 2017-01-01 RX ADMIN — PANTOPRAZOLE SODIUM 40 MG: 40 TABLET, DELAYED RELEASE ORAL at 05:04

## 2017-01-01 RX ADMIN — RENAGEL 800 MG: 800 TABLET ORAL at 07:47

## 2017-01-01 RX ADMIN — GABAPENTIN 100 MG: 100 CAPSULE ORAL at 21:10

## 2017-01-01 RX ADMIN — RENAGEL 800 MG: 800 TABLET ORAL at 06:51

## 2017-01-01 RX ADMIN — CINACALCET HYDROCHLORIDE 30 MG: 30 TABLET, COATED ORAL at 17:43

## 2017-01-01 RX ADMIN — LIDOCAINE HYDROCHLORIDE: 20 JELLY TOPICAL at 15:00

## 2017-01-01 RX ADMIN — MIDODRINE HYDROCHLORIDE 5 MG: 5 TABLET ORAL at 08:13

## 2017-01-01 RX ADMIN — MIDODRINE HYDROCHLORIDE 5 MG: 5 TABLET ORAL at 21:34

## 2017-01-01 RX ADMIN — EPINEPHRINE 1 MG: 0.1 INJECTION, SOLUTION ENDOTRACHEAL; INTRACARDIAC; INTRAVENOUS at 02:22

## 2017-01-01 RX ADMIN — RANOLAZINE 500 MG: 500 TABLET, FILM COATED, EXTENDED RELEASE ORAL at 22:56

## 2017-01-01 RX ADMIN — OXYCODONE HYDROCHLORIDE AND ACETAMINOPHEN 1 TABLET: 5; 325 TABLET ORAL at 03:02

## 2017-01-01 RX ADMIN — SODIUM CHLORIDE 50 ML/HR: 0.9 INJECTION, SOLUTION INTRAVENOUS at 08:25

## 2017-01-01 RX ADMIN — ASPIRIN 81 MG 81 MG: 81 TABLET ORAL at 08:38

## 2017-01-01 RX ADMIN — CALCIUM GLUCONATE 2 G: 94 INJECTION, SOLUTION INTRAVENOUS at 19:51

## 2017-01-01 RX ADMIN — SODIUM BICARBONATE 50 MEQ: 84 INJECTION, SOLUTION INTRAVENOUS at 02:01

## 2017-01-01 RX ADMIN — MIDAZOLAM 1 MG: 1 INJECTION INTRAMUSCULAR; INTRAVENOUS at 12:37

## 2017-01-01 RX ADMIN — INSULIN GLARGINE 12 UNITS: 100 INJECTION, SOLUTION SUBCUTANEOUS at 08:59

## 2017-01-01 RX ADMIN — SODIUM CHLORIDE 1000 ML: 0.9 INJECTION, SOLUTION INTRAVENOUS at 16:00

## 2017-01-01 RX ADMIN — PANTOPRAZOLE SODIUM 40 MG: 40 TABLET, DELAYED RELEASE ORAL at 05:23

## 2017-01-01 RX ADMIN — BISACODYL 10 MG: 5 TABLET, COATED ORAL at 17:24

## 2017-01-01 RX ADMIN — SENNOSIDES 8.6 MG: 8.6 TABLET, FILM COATED ORAL at 09:08

## 2017-01-01 RX ADMIN — ASPIRIN 243 MG: 81 TABLET, CHEWABLE ORAL at 22:27

## 2017-01-01 RX ADMIN — EPINEPHRINE 1 MG: 0.1 INJECTION, SOLUTION ENDOTRACHEAL; INTRACARDIAC; INTRAVENOUS at 02:13

## 2017-01-01 RX ADMIN — Medication 5000 ML: at 09:00

## 2017-01-01 RX ADMIN — SENNOSIDES 8.6 MG: 8.6 TABLET, FILM COATED ORAL at 08:38

## 2017-01-01 RX ADMIN — INSULIN GLARGINE 12 UNITS: 100 INJECTION, SOLUTION SUBCUTANEOUS at 08:37

## 2017-01-01 RX ADMIN — SODIUM BICARBONATE 50 MEQ: 84 INJECTION, SOLUTION INTRAVENOUS at 01:47

## 2017-01-01 RX ADMIN — DOCUSATE SODIUM 100 MG: 100 CAPSULE, LIQUID FILLED ORAL at 09:01

## 2017-01-01 RX ADMIN — TICAGRELOR 90 MG: 90 TABLET ORAL at 22:13

## 2017-01-01 RX ADMIN — LANTHANUM CARBONATE 1000 MG: 500 TABLET, CHEWABLE ORAL at 08:26

## 2017-01-01 RX ADMIN — INSULIN GLARGINE 12 UNITS: 100 INJECTION, SOLUTION SUBCUTANEOUS at 09:01

## 2017-01-01 RX ADMIN — GABAPENTIN 100 MG: 100 CAPSULE ORAL at 08:02

## 2017-01-01 RX ADMIN — ATORVASTATIN CALCIUM 80 MG: 80 TABLET, FILM COATED ORAL at 15:48

## 2017-01-01 RX ADMIN — IOHEXOL 100 ML: 350 INJECTION, SOLUTION INTRAVENOUS at 11:03

## 2017-01-01 RX ADMIN — FEBUXOSTAT 40 MG: 40 TABLET, FILM COATED ORAL at 10:40

## 2017-01-01 RX ADMIN — HEPARIN SODIUM 4000 UNITS: 1000 INJECTION, SOLUTION INTRAVENOUS; SUBCUTANEOUS at 10:18

## 2017-01-01 RX ADMIN — ONDANSETRON 4 MG: 2 INJECTION INTRAMUSCULAR; INTRAVENOUS at 05:55

## 2017-01-01 RX ADMIN — GABAPENTIN 100 MG: 100 CAPSULE ORAL at 18:15

## 2017-01-01 RX ADMIN — Medication 2 TABLET: at 17:15

## 2017-01-01 RX ADMIN — SODIUM CHLORIDE 250 ML: 0.9 INJECTION, SOLUTION INTRAVENOUS at 00:23

## 2017-01-01 RX ADMIN — EPINEPHRINE 1 MG: 0.1 INJECTION, SOLUTION ENDOTRACHEAL; INTRACARDIAC; INTRAVENOUS at 02:04

## 2017-01-01 RX ADMIN — HEPARIN SODIUM 5000 UNITS: 5000 INJECTION, SOLUTION INTRAVENOUS; SUBCUTANEOUS at 13:54

## 2017-01-01 RX ADMIN — GABAPENTIN 100 MG: 100 CAPSULE ORAL at 23:05

## 2017-01-01 RX ADMIN — DOCUSATE SODIUM 100 MG: 100 CAPSULE, LIQUID FILLED ORAL at 17:51

## 2017-01-01 RX ADMIN — CALCIUM GLUCONATE 1 G: 94 INJECTION, SOLUTION INTRAVENOUS at 22:54

## 2017-01-01 RX ADMIN — EPINEPHRINE 1 MG: 0.1 INJECTION, SOLUTION ENDOTRACHEAL; INTRACARDIAC; INTRAVENOUS at 01:49

## 2017-01-01 RX ADMIN — LIDOCAINE HYDROCHLORIDE 10 ML: 10 INJECTION, SOLUTION INFILTRATION; PERINEURAL at 15:01

## 2017-01-01 RX ADMIN — SODIUM PHOSPHATE, MONOBASIC, MONOHYDRATE 6 MMOL: 276; 142 INJECTION, SOLUTION INTRAVENOUS at 05:56

## 2017-01-01 RX ADMIN — INSULIN LISPRO 2 UNITS: 100 INJECTION, SOLUTION INTRAVENOUS; SUBCUTANEOUS at 06:51

## 2017-01-01 RX ADMIN — RANOLAZINE 500 MG: 500 TABLET, FILM COATED, EXTENDED RELEASE ORAL at 22:38

## 2017-01-01 RX ADMIN — DOCUSATE SODIUM 100 MG: 100 CAPSULE, LIQUID FILLED ORAL at 17:10

## 2017-01-01 RX ADMIN — DOCUSATE SODIUM 100 MG: 100 CAPSULE, LIQUID FILLED ORAL at 08:35

## 2017-01-01 RX ADMIN — GABAPENTIN 100 MG: 100 CAPSULE ORAL at 22:11

## 2017-01-01 RX ADMIN — MUPIROCIN 1 APPLICATION: 20 OINTMENT TOPICAL at 09:08

## 2017-01-01 RX ADMIN — Medication 5000 ML: at 18:20

## 2017-01-01 RX ADMIN — MIDODRINE HYDROCHLORIDE 5 MG: 5 TABLET ORAL at 22:13

## 2017-01-01 RX ADMIN — NOREPINEPHRINE BITARTRATE 6 MCG/MIN: 1 INJECTION INTRAVENOUS at 20:44

## 2017-01-01 RX ADMIN — SODIUM CHLORIDE, SODIUM LACTATE, POTASSIUM CHLORIDE, AND CALCIUM CHLORIDE: .6; .31; .03; .02 INJECTION, SOLUTION INTRAVENOUS at 13:45

## 2017-01-01 RX ADMIN — LANTHANUM CARBONATE 1000 MG: 500 TABLET, CHEWABLE ORAL at 11:23

## 2017-01-01 RX ADMIN — RANOLAZINE 500 MG: 500 TABLET, FILM COATED, EXTENDED RELEASE ORAL at 08:26

## 2017-01-01 RX ADMIN — RENAGEL 800 MG: 800 TABLET ORAL at 10:42

## 2017-01-01 RX ADMIN — GABAPENTIN 100 MG: 100 CAPSULE ORAL at 09:08

## 2017-01-01 RX ADMIN — CLOPIDOGREL BISULFATE 75 MG: 75 TABLET ORAL at 08:59

## 2017-01-01 RX ADMIN — LEVOTHYROXINE SODIUM 50 MCG: 50 TABLET ORAL at 06:01

## 2017-01-01 RX ADMIN — TICAGRELOR 90 MG: 90 TABLET ORAL at 21:53

## 2017-01-01 RX ADMIN — RANOLAZINE 500 MG: 500 TABLET, FILM COATED, EXTENDED RELEASE ORAL at 08:02

## 2017-01-01 RX ADMIN — ACETAMINOPHEN 650 MG: 325 TABLET, FILM COATED ORAL at 16:41

## 2017-01-01 RX ADMIN — FEBUXOSTAT 40 MG: 40 TABLET, FILM COATED ORAL at 08:40

## 2017-01-01 RX ADMIN — LANTHANUM CARBONATE 1000 MG: 500 TABLET, CHEWABLE ORAL at 11:02

## 2017-01-01 RX ADMIN — HEPARIN SODIUM 5000 UNITS: 5000 INJECTION, SOLUTION INTRAVENOUS; SUBCUTANEOUS at 05:15

## 2017-01-01 RX ADMIN — RENAGEL 400 MG: 400 TABLET ORAL at 16:59

## 2017-01-01 RX ADMIN — LEVOTHYROXINE SODIUM 50 MCG: 50 TABLET ORAL at 05:49

## 2017-01-01 RX ADMIN — LANTHANUM CARBONATE 1000 MG: 500 TABLET, CHEWABLE ORAL at 10:00

## 2017-01-01 RX ADMIN — Medication 5000 ML: at 06:41

## 2017-01-01 RX ADMIN — ATORVASTATIN CALCIUM 40 MG: 40 TABLET, FILM COATED ORAL at 17:15

## 2017-01-01 RX ADMIN — ASPIRIN 81 MG: 81 TABLET, CHEWABLE ORAL at 11:04

## 2017-01-01 RX ADMIN — HEPARIN SODIUM 4000 UNITS: 1000 INJECTION, SOLUTION INTRAVENOUS; SUBCUTANEOUS at 09:03

## 2017-01-01 RX ADMIN — FENTANYL CITRATE 25 MCG: 50 INJECTION INTRAMUSCULAR; INTRAVENOUS at 01:17

## 2017-01-01 RX ADMIN — CALCIUM ACETATE 1334 MG: 667 CAPSULE ORAL at 18:15

## 2017-01-01 RX ADMIN — INSULIN GLARGINE 12 UNITS: 100 INJECTION, SOLUTION SUBCUTANEOUS at 22:14

## 2017-01-01 RX ADMIN — LEVOTHYROXINE SODIUM 50 MCG: 50 TABLET ORAL at 05:04

## 2017-01-01 RX ADMIN — HEPARIN SODIUM 5000 UNITS: 5000 INJECTION, SOLUTION INTRAVENOUS; SUBCUTANEOUS at 21:33

## 2017-03-03 PROBLEM — N18.6 ESRD (END STAGE RENAL DISEASE) ON DIALYSIS (HCC): Chronic | Status: ACTIVE | Noted: 2017-01-01

## 2017-03-03 PROBLEM — R94.39 ABNORMAL STRESS TEST: Status: ACTIVE | Noted: 2017-01-01

## 2017-03-03 PROBLEM — Z99.2 ESRD (END STAGE RENAL DISEASE) ON DIALYSIS (HCC): Chronic | Status: ACTIVE | Noted: 2017-01-01

## 2017-03-03 PROBLEM — I10 HYPERTENSION: Chronic | Status: ACTIVE | Noted: 2017-01-01

## 2017-05-07 PROBLEM — L97.509 CHRONIC FOOT ULCER (HCC): Status: ACTIVE | Noted: 2017-01-01

## 2017-05-07 PROBLEM — I50.40 COMBINED SYSTOLIC AND DIASTOLIC HEART FAILURE (HCC): Status: ACTIVE | Noted: 2017-01-01

## 2017-05-07 PROBLEM — I42.9 CARDIOMYOPATHY (HCC): Status: ACTIVE | Noted: 2017-01-01

## 2017-05-07 PROBLEM — M10.9 GOUT: Status: ACTIVE | Noted: 2017-01-01

## 2017-05-07 PROBLEM — E13.9 DIABETES 1.5, MANAGED AS TYPE 2 (HCC): Status: ACTIVE | Noted: 2017-01-01

## 2017-05-07 PROBLEM — E66.01 MORBID OBESITY (HCC): Status: ACTIVE | Noted: 2017-01-01

## 2017-05-07 PROBLEM — Z89.439 HISTORY OF AMPUTATION OF FOOT (HCC): Status: ACTIVE | Noted: 2017-01-01

## 2017-05-07 PROBLEM — E66.2 HYPOVENTILATION ASSOCIATED WITH OBESITY SYNDROME (HCC): Status: ACTIVE | Noted: 2017-01-01

## 2017-05-07 PROBLEM — R79.89 LOW TSH LEVEL: Status: ACTIVE | Noted: 2017-01-01

## 2017-05-07 PROBLEM — R94.31 ABNORMAL EKG: Status: ACTIVE | Noted: 2017-01-01

## 2017-05-07 PROBLEM — Z95.820 S/P ANGIOPLASTY WITH STENT: Status: ACTIVE | Noted: 2017-01-01

## 2017-05-07 PROBLEM — M25.50 POLYARTHRALGIA: Status: ACTIVE | Noted: 2017-01-01

## 2017-05-08 PROBLEM — R79.89 ELEVATED TSH: Status: ACTIVE | Noted: 2017-01-01

## 2017-05-08 PROBLEM — I21.A1 NON-ST ELEVATION MYOCARDIAL INFARCTION (NSTEMI), TYPE 2: Status: ACTIVE | Noted: 2017-01-01

## 2017-05-08 PROBLEM — J98.4 ACUTE PULMONARY INSUFFICIENCY: Status: ACTIVE | Noted: 2017-01-01

## 2017-05-10 PROBLEM — M25.50 POLYARTHRALGIA: Status: RESOLVED | Noted: 2017-01-01 | Resolved: 2017-01-01

## 2017-06-30 PROBLEM — I42.9 CARDIOMYOPATHY (HCC): Chronic | Status: ACTIVE | Noted: 2017-01-01

## 2017-06-30 PROBLEM — E66.01 MORBID OBESITY WITH BMI OF 45.0-49.9, ADULT (HCC): Chronic | Status: ACTIVE | Noted: 2017-01-01

## 2017-06-30 PROBLEM — E13.9 DIABETES 1.5, MANAGED AS TYPE 2 (HCC): Chronic | Status: ACTIVE | Noted: 2017-01-01

## 2017-06-30 PROBLEM — E66.2 HYPOVENTILATION ASSOCIATED WITH OBESITY SYNDROME (HCC): Chronic | Status: ACTIVE | Noted: 2017-01-01

## 2017-06-30 PROBLEM — R07.9 CHEST PAIN: Status: ACTIVE | Noted: 2017-01-01

## 2017-06-30 PROBLEM — I50.40 COMBINED SYSTOLIC AND DIASTOLIC HEART FAILURE (HCC): Chronic | Status: ACTIVE | Noted: 2017-01-01

## 2017-06-30 PROBLEM — E66.01 MORBID OBESITY WITH BMI OF 45.0-49.9, ADULT (HCC): Status: ACTIVE | Noted: 2017-01-01

## 2017-07-27 PROBLEM — R55 POSTURAL DIZZINESS WITH NEAR SYNCOPE: Status: ACTIVE | Noted: 2017-01-01

## 2017-07-27 PROBLEM — R42 POSTURAL DIZZINESS WITH NEAR SYNCOPE: Status: ACTIVE | Noted: 2017-01-01

## 2017-07-28 PROBLEM — D64.9 LOW HEMOGLOBIN: Status: ACTIVE | Noted: 2017-01-01

## 2017-07-28 PROBLEM — E83.39 HYPERPHOSPHATEMIA: Status: ACTIVE | Noted: 2017-01-01

## 2017-07-28 PROBLEM — R13.10 DYSPHAGIA: Status: ACTIVE | Noted: 2017-01-01

## 2017-07-28 PROBLEM — E87.1 HYPONATREMIA: Status: ACTIVE | Noted: 2017-01-01

## 2017-07-28 PROBLEM — I95.9 HYPOTENSION: Status: ACTIVE | Noted: 2017-01-01

## 2017-07-28 PROBLEM — E87.6 HYPOKALEMIA: Status: ACTIVE | Noted: 2017-01-01

## 2017-07-29 PROBLEM — K59.00 CONSTIPATION: Status: ACTIVE | Noted: 2017-01-01

## 2017-08-02 PROBLEM — K56.600 PARTIAL SMALL BOWEL OBSTRUCTION (HCC): Status: ACTIVE | Noted: 2017-01-01

## 2017-08-03 PROBLEM — E83.51 HYPOCALCEMIA: Status: ACTIVE | Noted: 2017-01-01

## 2017-08-03 PROBLEM — K56.600 PARTIAL SMALL BOWEL OBSTRUCTION (HCC): Status: ACTIVE | Noted: 2017-01-01

## 2017-08-03 PROBLEM — E78.5 DYSLIPIDEMIA: Status: ACTIVE | Noted: 2017-01-01

## 2017-08-03 PROBLEM — N18.6 ANEMIA IN ESRD (END-STAGE RENAL DISEASE) (HCC): Status: ACTIVE | Noted: 2017-01-01

## 2017-08-03 PROBLEM — R57.9 SHOCK (HCC): Status: ACTIVE | Noted: 2017-01-01

## 2017-08-03 PROBLEM — D63.1 ANEMIA IN ESRD (END-STAGE RENAL DISEASE) (HCC): Status: ACTIVE | Noted: 2017-01-01

## 2017-08-03 PROBLEM — E87.6 HYPOKALEMIA: Status: RESOLVED | Noted: 2017-01-01 | Resolved: 2017-01-01

## 2017-08-03 PROBLEM — I50.32 CHRONIC DIASTOLIC HEART FAILURE (HCC): Status: ACTIVE | Noted: 2017-01-01

## 2017-08-03 PROBLEM — I25.5 ISCHEMIC CARDIOMYOPATHY: Status: ACTIVE | Noted: 2017-01-01

## 2017-08-03 PROBLEM — R07.9 CHEST PAIN: Status: RESOLVED | Noted: 2017-01-01 | Resolved: 2017-01-01

## 2017-08-05 LAB
ABO GROUP BLD BPU: NORMAL
BACTERIA BLD CULT: NORMAL
BACTERIA BLD CULT: NORMAL
BPU ID: NORMAL
UNIT DISPENSE STATUS: NORMAL
UNIT PRODUCT CODE: NORMAL
UNIT RH: NORMAL

## 2017-08-06 LAB
ATRIAL RATE: 94 BPM
PR INTERVAL: 661 MS
QRS AXIS: 204 DEGREES
QRSD INTERVAL: 133 MS
QT INTERVAL: 367 MS
QTC INTERVAL: 459 MS
T WAVE AXIS: 82 DEGREES
VENTRICULAR RATE: 94 BPM

## 2017-08-07 ENCOUNTER — GENERIC CONVERSION - ENCOUNTER (OUTPATIENT)
Dept: OTHER | Facility: OTHER | Age: 57
End: 2017-08-07

## 2017-08-08 ENCOUNTER — GENERIC CONVERSION - ENCOUNTER (OUTPATIENT)
Dept: OTHER | Facility: OTHER | Age: 57
End: 2017-08-08

## 2018-01-09 NOTE — RESULT NOTES
Verified Results  Coumadin Flow Sheet [id number] 11:35AM Anna Ruiz     Test Name Result Flag Reference   Diagnosis DVT     Managing Provider INR Goal Range 2-3     INR 2 4     Current Dose 3mg daily  sp/cma

## 2018-01-09 NOTE — RESULT NOTES
Verified Results  Coumadin Flow Sheet [id number] 04:04PM Erick Elenita     Test Name Result Flag Reference   Diagnosis DVT     Managing Provider INR Goal Range 2-3     INR 1 9     Current Dose 4 mg daily

## 2018-01-09 NOTE — RESULT NOTES
Verified Results  Coumadin Flow Sheet [id number] 09:39AM Lieutenant Chris     Test Name Result Flag Reference   Diagnosis DVT     Managing Provider INR Goal Range 2-3     INR 3 5     Current Dose 3 mg daily     Comments      call [address] visiting nurse at [phone]

## 2018-01-10 NOTE — RESULT NOTES
Verified Results  Coumadin Flow Sheet [id number] 12:00AM Constance Augustine     Test Name Result Flag Reference   Diagnosis DVT     Managing Provider INR Goal Range 2-3     INR 2 7     Current Dose      3mg MWF alt with 2mg all other days  sp/cma

## 2018-01-10 NOTE — MISCELLANEOUS
Message  Telephone call from Dalila Mcdermott at [facility] stating that patient's insurance, [employer], still has the request for Amnioband under review  States it can take up to 30 days  Active Problems    1  Allergic rhinitis (477 9) (J30 9)   2  Anemia (285 9) (D64 9)   3  Benign essential hypertension (401 1) (I10)   4  Cancer, colon (153 9) (C18 9)   5  Cardiomyopathy (425 4) (I42 9)   6  Closed Fracture Of The Left Tibial Spine (823 00)   7  Decubitus ulcer of heel, right, stage II (704 78,757 55) (Z75 170)   8  Delayed surgical wound healing of foot amputation stump (997 69,998 83)   (T87 89,T81 89XA)   9  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 60) (E11 49)   10  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 60) (E11 49)   11  Diabetic neuropathy (250 60,357 2) (E11 40)   12  DM (diabetes mellitus), type 2 with renal complications (345 18) (X12 36)   13  Dyslipidemia (272 4) (E78 5)   14  End stage renal disease (585 6) (N18 6)   15  Esophageal reflux (530 81) (K21 9)   16  Gout (274 9) (M10 9)   17  Hypercalcemia (275 42) (E83 52)   18  Hyperphosphatemia (275 3) (E83 39)   19  Hypocalcemia (275 41) (E83 51)   20  Lower extremity deep venous thrombosis (453 40) (I82 409)   21  Lumbar radiculopathy (724 4) (M54 16)   22  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)   23  Peritoneal Dialysis   24  Proteinuria (791 0) (R80 9)   25  Status post transmetatarsal amputation of right foot (V49 73) (Z89 431)   26  Type 2 diabetes mellitus with foot ulcer (250 80,707 15) (E11 621,L97 509)   27  Type 2 diabetes mellitus, uncontrolled, with renal complications (549 09) (H58 50,G82 13)    Current Meds   1  Accu-Chek Multiclix Lancets Miscellaneous; Drum, test blood sugar QID; Therapy: 31RYZ8574 to (Last Rx:04Jun2015)  Requested for: 55XMI8710 Ordered   2  User Replay In [employer]; Test 4 times daily, insulin requiring DM;    Therapy: 81UCX6349 to (Last Rx:04Jun2015)  Requested for: 87HHI5003 Ordered   3  Chavo Microlet Lancets Miscellaneous; Test twice daily, 250; Therapy: 07JTM3226 to (Last Rx:09Jbp7783)  Requested for: 59IDF9515 Ordered   4  BD Pen Needle Janene U/F 32G X 4 MM Miscellaneous; as directed; Therapy: 86IIL2745 to (Last Rx:04Jun2015)  Requested for: 36XOT1304 Ordered   5  Coumadin 3 MG Oral Tablet (Warfarin Sodium); 1 daily except for 1/2 tablet on Monday   and Thursday; Therapy: (Recorded:11Jan2016) to Recorded   6  Docusate Sodium 100 MG Oral Capsule; Therapy: (Recorded:08Jan2016) to Recorded   7  Fosrenol 1000 MG Oral Tablet Chewable; CHEW AND SWALLOW 1 TABLET 3 TIMES   DAILY WITH MEALS; Therapy: 60VKY5627 to (Evaluate:13Jun2015)  Requested for: 27WFM7362 Recorded   8  Gabapentin 100 MG Oral Capsule; TAKE 1 CAPSULE 3 times daily; Therapy: 02WWT9757 to ([place])  Requested for: 56FFG4406; Last   Rx:14May2015 Ordered   9  Gentamicin Sulfate (Topical) 0 1 % OINT; use as dir; Therapy: (0660 303 88 06) to Recorded   10  HydrALAZINE HCl - 25 MG Oral Tablet; TAKE 1 TABLET TWICE DAILY; Therapy: 43LLL7668 to Recorded   11  Lantus SoloStar 100 UNIT/ML Subcutaneous Solution Pen-injector; 12 units daily; Therapy: (Recorded:11Jan2016) to Recorded   12  Lidocaine HCl (Local Anesth ) 4 % SOLN; Apply prn to wound(s) prior to debridement for    pain control; Therapy: (Recorded:12Jan2016) to Recorded   13  Midodrine HCl - 5 MG Oral Tablet; 1 twice daily; Therapy: (0660 303 88 06) to Recorded   14  Nephrocaps 1 MG Oral Capsule; TAKE 1 CAPSULE DAILY  Requested for: 37Iaa4095;    Last Rx:52Vbd4647 Ordered   15  NovoLOG FlexPen 100 UNIT/ML Subcutaneous Solution Pen-injector; use as directed    sliding scale prior to meals, 1-7 units with meals depending upon readings; Therapy: 51OEH1314 to (Last Rx:14May2015)  Requested for: 28YJY3396 Ordered   16  PriLOSEC 20 MG Oral Capsule Delayed Release (Omeprazole); 1 every day;     Therapy: (Recorded:08Jan2016) to Recorded   17  ProAir  (90 Base) MCG/ACT Inhalation Aerosol Solution; USE 1 PUFF BY    MOUTH TWICE DAILY AS NEEDED FOR COPD/SHORTNESS OF BREATH; Therapy: 00RQS9052 to (Evaluate:18Feb2016)  Requested for: 15SUH3741; Last    Rx:20Nov2015 Ordered   18  Renvela 800 MG Oral Tablet; TAKE 4 TABLET 3 times daily; Therapy: 33Mee8841 to Recorded   19  Santyl 250 UNIT/GM External Ointment; APPLY TO AFFECTED AREA(S) ONCE DAILY    AS DIRECTED; Therapy: (Recorded:08Jan2016) to Recorded   20  Sennosides 17 2 MG TABS; TAKE AS DIRECTED; Therapy: (0660 303 88 06) to Recorded   21  Warfarin Sodium 2 MG Oral Tablet (Coumadin); one tablet on Monday, Wednesday and    Friday; Therapy: 30OFM3718 to (Last Rx:79Kec4061)  Requested for: 53CXN7200 Ordered   22  Warfarin Sodium 4 MG Oral Tablet; TAKE 1 TABLET DAILY; Therapy: 98BPW1545 to (Last Rx:15Jan2016)  Requested for: 47OLS4079 Ordered    Allergies    1  Dilaudid SOLN   2   Morphine Sulfate TABS    Signatures   Electronically signed by : Carlos Rubio RN; Feb 23 2016  9:51AM EST                       (Author)

## 2018-01-10 NOTE — RESULT NOTES
Verified Results  Coumadin Flow Sheet [id number] 06:30PM Michael Cespedes     Test Name Result Flag Reference   Diagnosis DVT     Managing Provider INR Goal Range 2-3     INR 2 6     Current Dose 3 5 mg daily

## 2018-01-10 NOTE — RESULT NOTES
Verified Results  Coumadin Flow Sheet [id number] 07:09PM Radha Dill     Test Name Result Flag Reference   Diagnosis DVT     Managing Provider INR Goal Range 2-3     INR 2 0     Current Dose      4 mg MWFSu 3mg all other days

## 2018-01-10 NOTE — RESULT NOTES
Verified Results  Coumadin Flow Sheet [id number] 12:44PM Rory Dunn     Test Name Result Flag Reference   Diagnosis DVT     Managing Provider INR Goal Range 2-3     INR      4 4 Per Taj Nielsen - visiting nurse   Current Dose 4mg daily     Comments      Please call Taj Nielsen back with next draw date and notify patient of new instructions

## 2018-01-10 NOTE — PROCEDURES
Procedures by Jesus Celestin PA-C  at 7/2/2017  2:06 PM      Author:  Jesus Celestin PA-C Service:  Critical Care/ICU Author Type:  Physician Assistant    Filed:  7/2/2017  4:55 PM Date of Service:  7/2/2017  2:06 PM Status:  Attested    :  Jesus Celestin PA-C (Physician Assistant)  Cosigner:  Barbi Estrada DO at 7/2/2017  4:59 PM      Procedure Orders:       1  CENTRAL LINE [[id number]] ordered by Jesus Celestin PA-C at 07/02/17 1406                 Post-procedure Diagnoses:       1  Hypotension [I95 9]              Attestation signed by Barbi Estrada DO at 7/2/2017  4:59 PM      As the critical care attending, I was present and supervised the entire procedure  Time out called and procedure excludes critical care time  Central Line Insertion  Date/Time: 7/2/2017 1:45 PM  Performed by: Ilene Ventura by: Stephanie Evans     Patient location:  Bedside  Other Assisting Provider:  No    Consent:     Consent obtained:  Verbal    Consent given by:  Patient    Risks discussed:  Arterial puncture, incorrect placement, nerve damage, infection, bleeding and pneumothorax    Alternatives discussed:  Delayed treatment  Universal protocol:     Procedure explained and questions answered to patient or proxy's satisfaction: yes      Imaging studies available: yes      Required blood products, implants, devices, and special equipment available:  yes      Site/side marked: yes      Immediately prior to procedure, a time out was called: yes      Patient identity confirmed:  Verbally with patient, hospital-assigned identification number and arm band  Pre-procedure details:     Hand hygiene: Hand hygiene performed prior to insertion      Sterile barrier technique:  All elements of maximal sterile technique followed      Skin preparation:  2% chlorhexidine and ChloraPrep    Skin preparation agent: Skin preparation agent completely dried prior to procedure    Indications: Central line indications: treatment therapy    Sedation:     Sedation type: Anxiolysis (ativan 0 5 mg)  Anesthesia (see MAR for exact dosages): Anesthesia method:  Local infiltration    Local anesthetic:  Lidocaine 1% w/o epi (10 ml)  Procedure details:     Location:  Right internal jugular    Vessel type: vein      Laterality:  Right    Approach: percutaneous technique used      Patient position:  Trendelenburg    Catheter type:  Triple lumen 16cm (Helio)    Catheter size:  12 Fr    Landmarks identified: yes      Ultrasound guidance: yes      Sterile ultrasound techniques: Sterile gel and sterile probe covers were used      Number of attempts:  1    Successful placement: yes      Vessel of catheter tip end:  Hub  Post-procedure details:     Post-procedure:  Dressing applied and line sutured    Assessment:  Blood return through all ports, free fluid flow, no pneumothorax on x-ray and placement verified by x-ray    Patient tolerance of procedure:   Tolerated well, no immediate complications                         Received for:Provider  EPIC   Jul 2 2017  5:00PM [facility] Standard Time

## 2018-01-11 NOTE — RESULT NOTES
Verified Results  Coumadin Flow Sheet [id number] 09:36AM Eve Serna     Test Name Result Flag Reference   Diagnosis DVT     Managing Provider INR Goal Range 2-3     INR 1 8     Current Dose      4 mg on Tue/THURS 3 mg all other days   Comments      results called in by [facility] visiting nurse, call patient with results   hg

## 2018-01-11 NOTE — RESULT NOTES
Verified Results  Coumadin Flow Sheet [id number] 09:31AM Cedric Cabrera     Test Name Result Flag Reference   Diagnosis DVT     Managing Provider INR Goal Range 2-3     INR 2 3     Current Dose      2mg Mon/Wed/Fri with 3mg all other days   Comments      call pt with new orders and call Josephine Knight with next draw date

## 2018-01-11 NOTE — PROGRESS NOTES
Assessment    1  Decubitus ulcer of heel, right, stage II (431 82,210 92) (J39 735)   2  Delayed surgical wound healing of foot amputation stump (997 69,998 83)   (T87 89,T81 89XA)   3  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 60) (E11 40)    Plan  Diabetes mellitus type 2 with neurological manifestations    · Collagenase (Santyl) instructions given; Status:Complete;   Done: 60ASG2437 10:02AM   Ordered; For:Diabetes mellitus type 2 with neurological manifestations; Ordered By:Pat Camilo;   · Follow-up visit in 1 week Evaluation and Treatment  Follow-up  Status: Hold For -  Scheduling  Requested for: 62BJP4533   Ordered; For: Diabetes mellitus type 2 with neurological manifestations; Ordered By: Xin Sullivan Performed:  Due: 50VGV2283    Wound Care Orders/Instructions    Wound Identification and Instructions   Wound #1: right TMA    Wound Care Instructions  Discussed with Patient/Caregiver  Dressing Type: Collagenase (Santyl)  Wash with mild soap and water, normal saline, wound cleanser or as specified  Apply specified dressing to wound base/bed  To periwound apply: Vaseline  Secondary dressing apply: Gauze, and ABD  Secure with: Kerlix  Dressing change frequency: Daily   Wound #2: right heel    Wound Care Instructions  Discussed with Patient/Caregiver  Dressing Type: Collagenase (Santyl)  Wash with mild soap and water, normal saline, wound cleanser or as specified  Apply specified dressing to wound base/bed  To periwound apply: Vaseline  Secondary dressing apply: Gauze  Secure with: Kerlix  Dressing change frequency: Daily      Wound Goals  Wound Goals:   Healing Goals:   Fair healing potential secondary to moderate comorbid conditions  Wound depth will decrease by 25%   Patient will achieve full wound closure and return to full ADLs       Discussion/Summary    Today patient was seen and evaluated, the wounds were debrided and dressed with dermagran gauze DSSHOSHANA Meier discussed glycemic control, protein intake and compliance with nonweightbearing area and patient is advised he may take a shower with the use of a cast cover and shower chair every 3-4 days  He is to follow-up with me in 1 week  Visiting nurses were faxed orders on dressing changes daily santyl to all wounds  The treatment plan was reviewed with the patient/guardian  The patient/guardian understands and agrees with the treatment plan     Call the [facility] for increased pain, redness, drainage, odor from or around your wound  If you have a compression wrap, check the circulation in your toes  If you expeience pain, swelling, a tingling sensation or a change in the color of your toes, elevate your legs  if your symptoms are not relieved with elevation, call the [facility]  Discharge Condition: Wound has not healed  Ambulatory Status: The patient is ambulatory without assistance  Discharge status: He will be discharged to home and was instructed to follow-up 1 week  Chief Complaint  Follow up for right TMA and Right heel      History of Present Illness    Wound Identification HPI   Wound #1: right TMA    Wound #2: right heel          The patient came to Wound Care via wheelchair  The patient is being seen for a follow-up with MD at the [facility]  The patient is accompanied by his brother in law  The patient's identification was verified  A secondary verification process was completed  Orientation: oriented to person, oriented to place and oriented to time  Blood Glucose:  126 mg/dL as reported by patient  1/12/16: The patient is referred for Right TMA and Right heel  Patient was referred by Dr Willie Georges  Patient reports he had a right TMA around Dec 11, 2015  Patient developed a blood clot to his left leg while in the hospital and was started on heparin and discharged on Coumadin  Patient was discharged from [facility] 1/7/2016 and instructed to follow up at [facility]   The patient reports he does perineal dialysis at home nightly  The patient arrived with Santyl intact to heel wound and wet to dry gauze on right TMA - patient states that home care ordered the supplies, but they have not been received  Patient is currently followed by [facility]  1/19/2016: Arrived with Dermagran to right TMA site, 4x4, Kerlix, tape to right TMA and right heel sites  2/2/2016: Arrived with Dermagran to right TMA sites, Santyl oint to right heel, 4x4, Jermaine intact to right foot  Patient has a 20lb weight gain, is going to dialysis center today for monthly visit  Patient does perineal dialysis at home  History of Falling: No = 0   Secondary Diagnosis: Yes = 15   Ambulatory Aid None, Bedrest, Nurse Assist = 0   No = 0   Gait: Normal = 0   Mental Status: Oriented to own ability = 0   Total Score: 15   < 25 = Low Risk         The most recent fall occurred denies any fall history  Nutrition Assessment Screening: Food intake over the last 3 months due to the loss of appetite, digestive problems, chewing or swallowing difficulties is graded as: 2 = no decrease in food intake   Weight loss during the last 3 months: 0 = greater than 3 kg (6 6 lbs)   Mobility scored as: 2 = goes out  Psychological Stress and Acute Disease Scored as: 2 = The patient has not experienced psychological stress or acute disease in the last 3 months  Neuropsychological problems scored as: 2 = no psychological problems  Body Mass Index (BMI) scored as: 3 = BMI 23 or greater  Nutritional Assessment Screening Score: 8 - 11 points - At risk of malnutrition  Provider Wound Care HPI: Patient presents today for care of right transmetatarsal amputation site and right heel wound  Patient states he has gotten his bed side commode and increased his compliance and he has had visiting nurses come and change his dressing  He does not complain of any nausea vomiting fever or chills   He also states he would denied by medicare for HBO as we did not intiate therapy immediately post-op  He has had a 20 lb weight gain and is going to the dialysis center today to have it taken off - he states this happens about every 1 1/2 months  Pain Assessment   the patient states they do not have pain  (on a scale of 0 to 10, the patient rates the pain at 0 )   Abuse And Domestic Violence Screen    Yes, the patient is safe at home  The patient states no one is hurting them  Depression And Suicide Screen  No, the patient has not had thoughts of hurting themself  No, the patient has not felt depressed in the past 7 days  Prefered Language is  [place]  Primary Language is  English  Readiness To Learn: Receptive  Barriers To Learning: none  Preferred Learning: demonstration   Education Completed: further treatment/follow-up and treatment/procedure   Teaching Method: verbal   Person Taught: patient   Evaluation Of Learning: verbalized/demonstrated understanding      Review of Systems    Constitutional: no fever or chills, feels well, no tiredness, no recent weight loss or weight gain  ENT: no complaints of earache, no loss of hearing, no nosebleeds or nasal discharge, no sore throat or hoarseness  Cardiovascular: no complaints of slow or fast heart rate, no chest pain, no palpitations, no leg claudication or lower extremity edema  Respiratory: no complaints of shortness of breath, no wheezing or cough, no dyspnea on exertion, no orthopnea or PND  Gastrointestinal: no complaints of abdominal pain, no constipation, no nausea or vomiting, no diarrhea or bloody stools  Genitourinary: End-stage renal disease on dialysis  Musculoskeletal: no complaints of arthralgia, no myalgia, no joint swelling or stiffness, no limb pain or swelling  Integumentary: skin wound  Neurological: numbness  Psychiatric: no depression, no mood disorders, no anxiety  ROS reviewed  Active Problems    1   Allergic rhinitis (477 9) (J30 9)   2  Anemia (285 9) (D64 9)   3  Benign essential hypertension (401 1) (I10)   4  Cancer, colon (153 9) (C18 9)   5  Cardiomyopathy (425 4) (I42 9)   6  Closed Fracture Of The Left Tibial Spine (823 00)   7  Decubitus ulcer of heel, right, stage II (340 32,852 37) (B14 525)   8  Delayed surgical wound healing of foot amputation stump (997 69,998 83)   (T87 89,T81 89XA)   9  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 60) (E11 40)   10  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 60) (E11 40)   11  Diabetic neuropathy (250 60,357 2) (E11 40)   12  DM (diabetes mellitus), type 2 with renal complications (568 51) (K92 52)   13  Dyslipidemia (272 4) (E78 5)   14  End stage renal disease (585 6) (N18 6)   15  Esophageal reflux (530 81) (K21 9)   16  Gout (274 9) (M10 9)   17  Hypercalcemia (275 42) (E83 52)   18  Hyperphosphatemia (275 3) (E83 39)   19  Hypocalcemia (275 41) (E83 51)   20  Lower extremity deep venous thrombosis (453 40) (I82 409)   21  Lumbar radiculopathy (724 4) (M54 16)   22  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)   23  Peritoneal Dialysis   24  Proteinuria (791 0) (R80 9)   25  Status post transmetatarsal amputation of right foot (V49 73) (Z89 431)   26  Type 2 diabetes mellitus with foot ulcer (250 80,707 15) (E11 621,L97 509)   27  Type 2 diabetes mellitus, uncontrolled, with renal complications (192 73)    (E11 29,E11 65)    Past Medical History    1  History of ESRD on dialysis (585 6,V45 11) (N18 6,Z99 2)   2  History of blood clots (V12 51) (Z86 718)   3  History of diabetes mellitus (V12 29) (Z86 39)   4  History of hypotension (V12 59) (Z86 79)   5  History of kidney disease (V13 09) (W75 667)    The active problems and past medical history were reviewed and updated today  Surgical History    1  History of Arteriovenous Surgery Creation Of A-V Fistula   2  History of Colon Surgery   3  Peritoneal Dialysis   4   History of Surgery Foot Amputation Transmetatarsal    The surgical history was reviewed and updated today  Family History    1  Family history of Colon Cancer (V16 0)   2  Family history of Coronary Artery Disease (V17 49)    The family history was reviewed and updated today  Social History    · Denied: History of Alcohol Use (History)   · Daily Tea Consumption (___ Cups/Day)   · Never A Smoker   · No drug use  The social history was reviewed and updated today  The social history was reviewed and is unchanged  Current Meds   1  Accu-Chek Multiclix Lancets Miscellaneous; Drum, test blood sugar QID; Therapy: 06RST6240 to (Last Rx:04Jun2015)  Requested for: 33WVQ0327 Ordered   2  Ortiva [employer] In [employer]; Test 4 times daily, insulin requiring DM; Therapy: 78TPC3676 to (Last Rx:04Jun2015)  Requested for: 93KCK8177 Ordered   3  Chavo Microlet Lancets Miscellaneous; Test twice daily, 250; Therapy: 63YPL1025 to (Last Rx:33Fuh2837)  Requested for: 74HBT4783 Ordered   4  BD Pen Needle Janene U/F 32G X 4 MM Miscellaneous; as directed; Therapy: 42BQV9132 to (Last Rx:04Jun2015)  Requested for: 01DWM5291 Ordered   5  Coumadin 3 MG Oral Tablet; 1 daily except for 1/2 tablet on Monday and Thursday; Therapy: (Recorded:11Jan2016) to Recorded   6  Docusate Sodium 100 MG Oral Capsule; Therapy: (Recorded:08Jan2016) to Recorded   7  Fosrenol 1000 MG Oral Tablet Chewable; CHEW AND SWALLOW 1 TABLET 3 TIMES   DAILY WITH MEALS; Therapy: 79YOV0699 to (Evaluate:13Jun2015)  Requested for: 66FYF7415 Recorded   8  Gabapentin 100 MG Oral Capsule; TAKE 1 CAPSULE 3 times daily; Therapy: 83CHM1884 to (Alina Mcintosh)  Requested for: 31NEL1104; Last   Rx:85Zxe1077 Ordered   9  Gentamicin Sulfate (Topical) 0 1 % OINT; use as dir; Therapy: (0699 982 13 20) to Recorded   10  HydrALAZINE HCl - 25 MG Oral Tablet; TAKE 1 TABLET TWICE DAILY; Therapy: 75MZC8532 to Recorded   11   Lantus SoloStar 100 UNIT/ML Subcutaneous Solution Pen-injector; 12 units daily; Therapy: (Recorded:11Jan2016) to Recorded   12  Lidocaine HCl (Local Anesth ) 4 % SOLN; Apply prn to wound(s) prior to debridement for    pain control; Therapy: (Recorded:12Jan2016) to Recorded   13  Midodrine HCl - 5 MG Oral Tablet; 1 twice daily; Therapy: (([phone] [phone]) to Recorded   14  Nephrocaps 1 MG Oral Capsule; TAKE 1 CAPSULE DAILY  Requested for: 80Ysj0148;    Last Rx:34Kbc9015 Ordered   15  NovoLOG FlexPen 100 UNIT/ML Subcutaneous Solution Pen-injector; use as directed    sliding scale prior to meals, 1-7 units with meals depending upon readings; Therapy: 56XDG6798 to (Last Rx:11Ova9264)  Requested for: 43VKD7341 Ordered   16  PriLOSEC 20 MG Oral Capsule Delayed Release; 1 every day; Therapy: ((08) 9316 1484) to Recorded   17  ProAir  (90 Base) MCG/ACT Inhalation Aerosol Solution; USE 1 PUFF BY MOUTH    TWICE DAILY AS NEEDED FOR COPD/SHORTNESS OF BREATH; Therapy: 67PTT1400 to (Evaluate:18Feb2016)  Requested for: 53TBD1524; Last    Rx:20Nov2015 Ordered   18  Renvela 800 MG Oral Tablet; TAKE 4 TABLET 3 times daily; Therapy: 06Aug2015 to Recorded   19  Santyl 250 UNIT/GM External Ointment; APPLY TO AFFECTED AREA(S) ONCE DAILY AS    DIRECTED; Therapy: (Recorded:08Jan2016) to Recorded   20  Sennosides 17 2 MG TABS; TAKE AS DIRECTED; Therapy: ((08) 9316 1484) to Recorded   21  Warfarin Sodium 4 MG Oral Tablet; TAKE 1 TABLET DAILY; Therapy: 75CZO6934 to (Last Rx:15Jan2016)  Requested for: 68TOK8841 Ordered    The medication list was reviewed and updated today  Allergies    1  Dilaudid SOLN   2   Morphine Sulfate TABS    Vitals  Vital Signs [Data Includes: Current Encounter]    Recorded: 29FFE5038 09:19AM   Temperature 97 3 F, Tympanic   Heart Rate 18, R Radial   Pulse Quality Regular, R Radial   Respiration 18   Respiration Quality Normal   Systolic 081, RUE, Sitting   Diastolic 84, RUE, Sitting   Height 6 ft 1 in Weight 340 lb 0 1 oz   BMI Calculated 44 86   BSA Calculated 2 7   Pain Scale 0     Physical Exam    Pulse Exam   Posterior tibialis: right 2+ and left 2+  Dorsalis pedis: right 2+ and left 2+  Normal Capillary Refill  Extremities: No lower extremity edema  Wound #1 Assessment wound #1 Location:, Right TMA, started on 12/11/2015, Care for this wound started on 1/12/2016  Wound Status: not healed  Non Healing Surgical: Full Thickness  Length: 2 2cm x Width: 3 6cm x Depth: 0 3cm   Total: 7 92sq cm   Wound Volume: 2 376cm3           Tissue type: Granulation and Slough   Color of Wound: Red - 30% and Yellow - 70%   Exudate Amount: Moderate   Exudate Type: Serosangiunous   Odor: None   Exudate Color: Yellow   Wound Edges: Rolled (epibolized)   Periwound Skin Condition: Erythematous, Scaly, Edema, scattered scabs, dry   Comments: 2 areas measured with epithelization between wound bed with x1 scabbed area  Physician/Provider Wound #1 Exam   I agree with the nursing assessment and documentation  This is non healing surgical wound from failed flap for TMA, the wound base is mixed fibro-granular, it is dry, it does not probe to muscle tendon or bone  There are no signs of infection  Wound #2 Assessment wound #2 Location:, right heel, started on 12/2015, Care for this wound started on 1/12/2016  Wound Status: not healed  PressureUlcer Grade: Stage II  Length: 2cm x Width: 3cm x Depth: 0 1cm   Total: 6sq cm   Wound Volume: 0 6cm3           Tissue type: Granulation and Slough   Color of Wound: Red - 20% and Yellow - 80%   Exudate Amount: Moderate   Exudate Type: Serosangiunous   Odor: None   Exudate Color: Yellow and pink   Wound Edges: Callous   Periwound Skin Condition: Macerated, Callus, Scaly, DRY        Physician/Provider Wound #2 Exam   I agree with the nursing assessment and documentation   This is a stage II heel pressure ulcer, there is mixed fibro-granular tissue, there are no sinus tracts wound does not probe to muscle tendon or bone  There are no signs of infection  Constitutional - General appearance: No acute distress, well appearing and well nourished  Pulmonary - Respiratory effort: No increased work of breathing or signs of respiratory distress  Cardiovascular - DP and PT are within normal limits  No lower extremity edema  Musculoskeletal - Nails are in good repair left foot  Muscle strength/tone: Normal    Neurologic - [place] 5 0 7 monofilament is absent bilateral feet  Psychiatric - Orientation to person, place, and time: Normal  Mood and affect: Normal       Results/Data  Encounter Results   Coumadin Flow Sheet [id number] 01:31PM      Test Name Result Flag Reference   Recheck INR one week     New Dose SAME     Patient Notified      2/1/2016 pt and vedika made aware  hg       Trg Shalonda 1: Wound Nursing Care Plan   Impaired Tissue Integrity related to: right TMA, right heel   Risk for Infection related to open wound:   Goals   Patient will achieve 100% epithelialization:  Patient will maintain skin integrity:  Patient will demonstrate and verbalize knowledge of their disease process and management:  Patient will verbalize knowledge of and demonstrate adherence to interventions to achieve optimal nutrition required for wound healing:  Wound Nursing Care Interventions:   Provide moist wound healing:  Implement offloading measures (turn & reposition schedule/method, ortho shoes/boots, floating heels, crutches, specialty surfaces:  Teach and evaluate effectiveness of offloading measures:  Teach patient and/or family about disease process and management methods:  Teach patient and/or family about infection control measures (gloving, hand sanitation, MDROs, disposal of soiled dressings, antibiotic therapy): Duane Tabor    Evaluate effectiveness of all above measures every 4 weeks with Patient Specific CQI:    Other:  Care plan initiated 1/12/16      Procedure      Wound #1: right TMA     Nurse Dressing Change:   Wound #1 The wound located on the right TMA  Wound care rendered as per Physician/Advanced Practitioner order/plan  Order sent to Home Care  Return to [address] [place] [address] 1 week  Comments:    Excisional Debridement Subcutaneous Tissue:   Wound was excisionally debrided to subcutaneous tissue as follows  Prior to the procedure, the patient was identified using two identifiers, the general consent was signed, the proper site of procedure was identified, and a time out was taken  Anesthesia: Local anesthesia with 4% topical lidocaine was utilized prior to the procedure for pain control  #15 surgical blade was utilized to surgically excise devitalized tissue and/or slough through epidermis, dermis and into the subcutaneous tissue  The total sq cm excised was 7 92sq cm  There was minimal bleeding controlled with gentle pressure  the patient tolerated the procedure well without complication  CPT Code(s)   [id number] - Excisional DebridementTo Subcutaneous Tissue; first 20 sq cm  Wound #2: right heel     Nurse Dressing Change:   Wound #2 The wound located on the right heel  Wound care rendered as per Physician/Advanced Practitioner order/plan  Order sent to Home Care  Return to [address] [place] [address] 1 week  Comments:    Excisional Debridement Subcutaneous Tissue:   Wound was excisionally debrided to subcutaneous tissue as follows  Prior to the procedure, the patient was identified using two identifiers, the general consent was signed, the proper site of procedure was identified, and a time out was taken  Anesthesia: Local anesthesia with 4% topical lidocaine was utilized prior to the procedure for pain control  #15 surgical blade was utilized to surgically excise devitalized tissue and/or slough through epidermis, dermis and into the subcutaneous tissue  The total sq cm excised was 6 0sq cm   There was minimal bleeding controlled with gentle pressure  the patient tolerated the procedure well without complication  CPT Code(s)   [id number] - Excisional DebridementTo Subcutaneous Tissue; first 20 sq cm        Signatures   Electronically signed by : Amita Lee DPM; Feb 2 2016 10:03AM EST                       (Author)    Electronically signed by : Amita Lee DPM; Feb 2 2016 10:55AM EST                       (Author)

## 2018-01-11 NOTE — RESULT NOTES
Verified Results  Coumadin Flow Sheet [id number] 03:54PM Pramod Montalvo     Test Name Result Flag Reference   Diagnosis DVT     Managing Provider INR Goal Range 2-3     INR 1 9     Current Dose      4mg MWFSu alt with 3mg all other days  sp/cma

## 2018-01-11 NOTE — MISCELLANEOUS
Assessment   1  Diabetic neuropathy (250 60,357 2) (E11 40)  2  Benign essential hypertension (401 1) (I10)  3  End stage renal disease (585 6) (N18 6)  4  Peritoneal Dialysis    Plan  Hypothyroidism    · (1) TSH; Status:Active; Requested SRV:[id number];   Perform:[facility]; Due:15Jun2018; Ordered; For:Hypothyroidism; Ordered   By:Benedict Augustine Cure; Discussion/Summary  Discussion Summary:   F/u Renal, Cardio, Rheumatology  if symptoms persis Labs PRN  Counseling Documentation With Imm: The patient was counseled regarding  Chief Complaint  Chief Complaint Free Text Note Form: TCM [facility] Pt still very achy and tired dialysis is nightly rmklpn      History of Present Illness  TCM Communication [facility]: The patient is being contacted for follow-up after hospitalization and was discharged on 05/10/2017  He was hospitalized at [place] [address]  The date of admission: 05/07/2017, date of discharge: 05/10/2017  Diagnosis: Diagnosis Noa arthralgia/acute gouty attach Probable Obstructive Sleep Apnea Severe Pulmonary Hypertension  He was discharged to home  Medications reviewed and updated today  He scheduled a follow up appointment  Follow-up appointments with other specialists: Cardiologist standing Office visit every 4 weeks Nephrology standing Office visit is every other week PCP on 05/15/2017  Symptoms: weakness and fatigue, but no fever, no dizziness, no headache, no cough, no shortness of breath and no chest pain  Counseling was provided to the patient  Jule Baumgarten Pt's wife very active with the pt's care  Communication performed and completed by Brayden Black LPN   HPI: Spoke with Lisandra Ash and Hiren Byrne Pt is currently at home  Pt reports still feeling stiff and achy tires easy On Dialysis daily at home  All medications were reviewed  According to the pt and his wife a Rheumatologist follow up was not needed on discharge   Pt aware if he has SOB, Chest Pain, Fever, New or Worsening symptoms to report to the ED [facility]      Review of Systems  Complete-Male:   Constitutional: feeling poorly and feeling tired, but no fever and no chills  Eyes: eyesight problems  ENT: no nasal discharge  Gastrointestinal: no abdominal pain  Musculoskeletal: myalgias and joint stiffness  Integumentary: no rashes and no itching  Neurological: difficulty walking, but no headache  Psychiatric: not suicidal    Endocrine: no hot flashes  Hematologic/Lymphatic: no swollen glands  Active Problems   1  Abdominal pain, epigastric (789 06) (R10 13)  2  Adult BMI 45 0-49 9 kg/sq m (V85 42) (Z68 42)  3  Allergic rhinitis (477 9) (J30 9)  4  Anemia (285 9) (D64 9)  5  Benign essential hypertension (401 1) (I10)  6  Cancer, colon (153 9) (C18 9)  7  Cardiomyopathy (425 4) (I42 9)  8  Chronic foot ulcer, limited to breakdown of skin, right (707 15) (L97 511)  9  Chronic foot ulcer, right, with fat layer exposed (707 15) (L97 512)  10  Closed Fracture Of The Left Tibial Spine (823 00)  11  Controlled diabetes mellitus with foot ulcer (250 80,707 15) (E11 621,L97 509)  12  Decubitus ulcer of heel, right, stage II (524 01,444 28) (L89 612)  13  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 60) (E11 49)  14  Diabetic neuropathy (250 60,357 2) (E11 40)  15  DM (diabetes mellitus), type 2 with renal complications (378 80) (A44 54)  16  Dyslipidemia (272 4) (E78 5)  17  End stage renal disease (585 6) (N18 6)  18  Esophageal reflux (530 81) (K21 9)  19  Gout (274 9) (M10 9)  20  Hypercalcemia (275 42) (E83 52)  21  Hyperphosphatemia (275 3) (E83 39)  22  Hypocalcemia (275 41) (E83 51)  23  Joint pain (719 40) (M25 50)  24  Lower extremity deep venous thrombosis (453 40) (I82 409)  25  Lumbar radiculopathy (724 4) (M54 16)  26  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)  27  Peritoneal Dialysis  28  Proteinuria (791 0) (R80 9)  29   Status post transmetatarsal amputation of right foot (V40 30) (K95 152)    Past Medical History   1  History of Abscess of leg, right (682 6) (L02 415)  2  History of ESRD on dialysis (585 6,V45 11) (N18 6,Z99 2)  3  History of blood clots (V12 51) (Z86 718)  4  History of diabetes mellitus (V12 29) (Z86 39)  5  History of hypotension (V12 59) (Z86 79)  6  History of kidney disease (V13 09) (E20 733)    Surgical History   1  History of Arteriovenous Surgery Creation Of A-V Fistula  2  History of Colon Surgery  3  Peritoneal Dialysis  4  History of Surgery Foot Amputation Transmetatarsal  Surgical History Reviewed: The surgical history was reviewed and updated today  Family History  Family History   1  Family history of Colon Cancer (V16 0)  2  Family history of Coronary Artery Disease (V17 49)  Family History Reviewed: The family history was reviewed and updated today  Social History    · Denied: History of Alcohol Use (History)   · Daily Tea Consumption (___ Cups/Day)   ·    · Never A Smoker   · No drug use  Social History Reviewed: The social history was reviewed and updated today  The social history was reviewed and is unchanged  Current Meds  1  Accu-Chek Multiclix Lancets Miscellaneous; Drum, test blood sugar QID; Therapy: 12TBF4116 to (Last Rx:04Jun2015)  Requested for: 36LFZ9008 Ordered  2  Albuterol Sulfate NEBU; Therapy: (Recorded:42Cco0934) to Recorded  3  Aspirin EC 81 MG Oral Tablet Delayed Release; Take 1 tablet daily; Therapy: 83UHC0676 to Recorded  4  [employer]; Test 4 times daily, insulin requiring DM; Therapy: 85JZZ1295 to (Last Rx:04Jun2015)  Requested for: 40SYW1915 Ordered  5  Chavo Microlet Lancets Miscellaneous; Test twice daily, 250; Therapy: 73RSR5276 to (Last Rx:97Mua7255)  Requested for: 49NJN6904 Ordered  6  BD Pen Needle Janene U/F 32G X 4 MM Miscellaneous; as directed; Therapy: 29GDI9354 to (Last Rx:88Woi5426)  Requested for: 75QHT6989 Ordered  7   Clopidogrel Bisulfate 75 MG Oral Tablet; take 1 tablet by mouth once daily; Therapy: 63VJS0557 to Recorded  8  Diovan 160 MG Oral Tablet; TAKE 1 TABLET DAILY FOR BLOOD PRESSURE; Therapy: (Recorded:28Mar2017) to Recorded  9  Fosrenol 1000 MG Oral Tablet Chewable; CHEW AND SWALLOW 1 TABLET 3 TIMES   DAILY WITH MEALS; Therapy: 05LSE0555 to (Evaluate:13Jun2015)  Requested for: 98MHS2964 Recorded  10  Gabapentin 100 MG Oral Capsule; TAKE ONE CAPSULE BY MOUTH THREE TIMES    DAILY; Therapy: 15ZLW4201 to (Evaluate:76Odm7492)  Requested for: 02Apr2017; Last    Rx:02Apr2017 Ordered  11  Gentamicin Sulfate (Topical) 0 1 % OINT; use as dir; Therapy: (Recorded:08Jan2016) to Recorded  12  Lantus SoloStar 100 UNIT/ML Subcutaneous Solution Pen-injector; 12 units daily     Requested for: 21Nov2016; Last Rx:21Nov2016 Ordered  13  Levothyroxine Sodium 50 MCG Oral Tablet; Take 1 tablet daily; Therapy: 26BWD9407 to (Susan Shah) Recorded  14  Nitroglycerin 0 4 MG Sublingual Tablet Sublingual; PLACE 1 TABLET UNDER THE    TONGUE EVERY 5 MINUTES FOR UP TO 3 DOSES AS NEEDED FOR CHEST    PAIN  CALL 911 IF PAIN PERSISTS; Therapy: 61ZLN3776 to Recorded  15  NovoLOG FlexPen 100 UNIT/ML Subcutaneous Solution Pen-injector; use as directed    sliding scale prior to meals, 1-7 units with meals depending upon readings; Therapy: 76SYF8311 to (Last Rx:77Qyy9469)  Requested for: 69Lhy5675 Ordered  16  Pantoprazole Sodium 40 MG Oral Tablet Delayed Release; Take 1 tablet daily; Therapy: 80LFF0797 to (Iron Dominguez)  Requested for: 11TGM7168; Last    Rx:13Mar2017 Ordered  17  ProAir  (90 Base) MCG/ACT Inhalation Aerosol Solution; USE 1 PUFF BY MOUTH    TWICE DAILY AS NEEDED FOR COPD/SHORTNESS OF BREATH; Therapy: 70XKP5867 to (Evaluate:18Feb2016)  Requested for: 17EUV4633; Last    Rx:20Nov2015 Ordered  18  Renvela 800 MG Oral Tablet; TAKE 4 TABLET 3 times daily; Therapy: 92Caa8010 to Recorded  19   TraMADol HCl - 50 MG Oral Tablet; TAKE 1 TABLET EVERY 8 HOURS AS NEEDED; Therapy: 27Apr2017 to (Evaluate:80Pxw8876); Last Rx:27Apr2017 Ordered  20  Uloric 40 MG Oral Tablet; take 1 tablet by mouth every day; Therapy: 79OYA5465 to (Gradie Simmonds)  Requested for: 80UDN3395; Last    ZU:82XKJ4972 Ordered  Medication List Reviewed: The medication list was reviewed and updated today  Allergies   1  Dilaudid SOLN  2  Morphine Sulfate TABS    Physical Exam    Eyes   Conjunctiva and lids: No swelling, erythema, or discharge  non icteric  Ears, Nose, Mouth, and Throat   External inspection of ears and nose: Normal     Pulmonary   Respiratory effort: No increased work of breathing or signs of respiratory distress  decreased  Cardiovascular   Auscultation of heart: Normal rate and rhythm, normal S1 and S2, without murmurs  Carotid pulses: Normal     Abdomen   Abdomen: Abnormal   The abdomen was rounded  Musculoskeletal slow  Neurologic   Cranial nerves: Cranial nerves 2-12 intact      Psychiatric   Orientation to person, place and time: Normal     Mood and affect: Normal          Signatures   Electronically signed by : Akil Meek MD; May 15 2017  1:31PM EST                       (Author)    Electronically signed by : Akil Meek MD; May 15 2017  1:42PM EST                       (Author)

## 2018-01-11 NOTE — MISCELLANEOUS
Message  Telephone call from patient requesting refill for santyl to [facility] [phone]  Greg Waite RN called in refill - santyl 250gram tube, apply nickel thick layer to wound once daily; three refills  Active Problems    1  Allergic rhinitis (477 9) (J30 9)   2  Anemia (285 9) (D64 9)   3  Benign essential hypertension (401 1) (I10)   4  Cancer, colon (153 9) (C18 9)   5  Cardiomyopathy (425 4) (I42 9)   6  Closed Fracture Of The Left Tibial Spine (823 00)   7  Decubitus ulcer of heel, right, stage II (566 17,427 89) (E02 238)   8  Delayed surgical wound healing of foot amputation stump (997 69,998 83)   (T87 89,T81 89XA)   9  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 60) (E11 40)   10  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 60) (E11 40)   11  Diabetic neuropathy (250 60,357 2) (E11 40)   12  DM (diabetes mellitus), type 2 with renal complications (139 87) (T80 78)   13  Dyslipidemia (272 4) (E78 5)   14  End stage renal disease (585 6) (N18 6)   15  Esophageal reflux (530 81) (K21 9)   16  Gout (274 9) (M10 9)   17  Hypercalcemia (275 42) (E83 52)   18  Hyperphosphatemia (275 3) (E83 39)   19  Hypocalcemia (275 41) (E83 51)   20  Lower extremity deep venous thrombosis (453 40) (I82 409)   21  Lumbar radiculopathy (724 4) (M54 16)   22  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)   23  Peritoneal Dialysis   24  Proteinuria (791 0) (R80 9)   25  Status post transmetatarsal amputation of right foot (V49 73) (Z89 431)   26  Type 2 diabetes mellitus with foot ulcer (250 80,707 15) (E11 621,L97 509)   27  Type 2 diabetes mellitus, uncontrolled, with renal complications (785 63) (A76 27,N51 70)    Current Meds   1  Accu-Chek Multiclix Lancets Miscellaneous; Drum, test blood sugar QID; Therapy: 98PHT4027 to (Last Rx:04Jun2015)  Requested for: 23UJF0419 Ordered   2  [employer]; Test 4 times daily, insulin requiring DM;    Therapy: 06SSS8124 to (Last Rx: 10GAZ4432)  Requested for: 40PPK5431 Ordered   3  Chavo Microlet Lancets Miscellaneous; Test twice daily, 250; Therapy: 91YNY8186 to (Last Rx:28Sgs3156)  Requested for: 24VAA5815 Ordered   4  BD Pen Needle Janene U/F 32G X 4 MM Miscellaneous; as directed; Therapy: 41ILY7146 to (Last Rx:04Jun2015)  Requested for: 83ZFB6411 Ordered   5  Coumadin 3 MG Oral Tablet (Warfarin Sodium); 1 daily except for 1/2 tablet on Monday   and Thursday; Therapy: (Recorded:11Jan2016) to Recorded   6  Docusate Sodium 100 MG Oral Capsule; Therapy: (Recorded:08Jan2016) to Recorded   7  Fosrenol 1000 MG Oral Tablet Chewable; CHEW AND SWALLOW 1 TABLET 3 TIMES   DAILY WITH MEALS; Therapy: 61IYM3636 to (Evaluate:13Jun2015)  Requested for: 33QDD0268 Recorded   8  Gabapentin 100 MG Oral Capsule; TAKE 1 CAPSULE 3 times daily; Therapy: 55JEX1685 to ([place] Rule)  Requested for: 10ZZU7169; Last   Rx:14May2015 Ordered   9  Gentamicin Sulfate (Topical) 0 1 % OINT; use as dir; Therapy: (0660 303 88 06) to Recorded   10  HydrALAZINE HCl - 25 MG Oral Tablet; TAKE 1 TABLET TWICE DAILY; Therapy: 65RMQ2796 to Recorded   11  Lantus SoloStar 100 UNIT/ML Subcutaneous Solution Pen-injector; 12 units daily; Therapy: (Recorded:11Jan2016) to Recorded   12  Lidocaine HCl (Local Anesth ) 4 % SOLN; Apply prn to wound(s) prior to debridement for    pain control; Therapy: (Recorded:12Jan2016) to Recorded   13  Midodrine HCl - 5 MG Oral Tablet; 1 twice daily; Therapy: (0660 303 88 06) to Recorded   14  Nephrocaps 1 MG Oral Capsule; TAKE 1 CAPSULE DAILY  Requested for: 60Kpo0289;    Last Rx:71Fee4612 Ordered   15  NovoLOG FlexPen 100 UNIT/ML Subcutaneous Solution Pen-injector; use as directed    sliding scale prior to meals, 1-7 units with meals depending upon readings; Therapy: 87PLW2245 to (Last Rx:14May2015)  Requested for: 98VDB0385 Ordered   16   PriLOSEC 20 MG Oral Capsule Delayed Release (Omeprazole); 1 every day; Therapy: (Alberta Caller) to Recorded   17  ProAir  (90 Base) MCG/ACT Inhalation Aerosol Solution; USE 1 PUFF BY    MOUTH TWICE DAILY AS NEEDED FOR COPD/SHORTNESS OF BREATH; Therapy: 84QZY1822 to (Evaluate:18Feb2016)  Requested for: 02YKF9747; Last    Rx:20Nov2015 Ordered   18  Renvela 800 MG Oral Tablet; TAKE 4 TABLET 3 times daily; Therapy: 24Jpi7733 to Recorded   19  Santyl 250 UNIT/GM External Ointment; APPLY TO AFFECTED AREA(S) ONCE DAILY    AS DIRECTED; Therapy: (Recorded:08Jan2016) to Recorded   20  Sennosides 17 2 MG TABS; TAKE AS DIRECTED; Therapy: (Alberta Grimaldoer) to Recorded   21  Warfarin Sodium 4 MG Oral Tablet; TAKE 1 TABLET DAILY; Therapy: 46IPW5885 to (Last Rx:15Jan2016)  Requested for: 32YNG3553 Ordered    Allergies    1  Dilaudid SOLN   2   Morphine Sulfate TABS    Signatures   Electronically signed by : Adan Olmos RN; Jan 22 2016  9:54AM EST                       (Author)

## 2018-01-11 NOTE — RESULT NOTES
Verified Results  Coumadin Flow Sheet [id number] 09:55AM Corrinne Pea     Test Name Result Flag Reference   Diagnosis DVT     Managing Provider INR Goal Range 2-3     INR 1 6     Current Dose 3 mg daily     Patient Notified      results given verbally by Johnny Tamayo from visiting nurses call [phone]  hg

## 2018-01-12 VITALS
SYSTOLIC BLOOD PRESSURE: 130 MMHG | HEART RATE: 72 BPM | TEMPERATURE: 97.4 F | BODY MASS INDEX: 41.75 KG/M2 | OXYGEN SATURATION: 95 % | HEIGHT: 73 IN | DIASTOLIC BLOOD PRESSURE: 78 MMHG | WEIGHT: 315 LBS

## 2018-01-12 VITALS
OXYGEN SATURATION: 95 % | DIASTOLIC BLOOD PRESSURE: 76 MMHG | TEMPERATURE: 98.2 F | WEIGHT: 315 LBS | SYSTOLIC BLOOD PRESSURE: 126 MMHG | BODY MASS INDEX: 41.75 KG/M2 | HEIGHT: 73 IN | HEART RATE: 80 BPM

## 2018-01-12 NOTE — RESULT NOTES
Verified Results  Coumadin Flow Sheet [id number] 10:03AM Travon Maharaj     Test Name Result Flag Reference   Diagnosis DVT     Managing Provider INR Goal Range 2-3     INR 1 6     Current Dose 3 mg daily       Coumadin Flow Sheet [id number] 10:03AM Travon Maharaj     Test Name Result Flag Reference   Comments      call Adriana Murdocko vna [phone] with new directions

## 2018-01-12 NOTE — MISCELLANEOUS
History of Present Illness  Care Coordination: This patient was seen on peritoneal dialysis monthly rounding on 3/16/17  Below is a summary of his/her evaluation:    1  ESRD on PD: Kinetics adequate  Being seen at [facility] for transplant  Unclear if he will remain on transplant list due to new findings of severe CAD and CHF  He follow up at [facility]  2  Access: PD catheter without issues  3  CAD, ICM: New findings noted on recent cath by Dr Barbi Jacobo  Follow up with Dr Al Galarza  EF is 28% by v-gram    4  HTN: BP is controlled and at goal  Has lightheadedness since starting Metoprolol  I advised to decrease Metoprolol to 12 5 mg BID if still lightheaded after stopping AM dose of Valsartan 80mg  5  Anemia: Hgb at goal despite no Epogen  6  MBD: Phos is worse because taking less Fosrenol  Will appeal with insurance the Fosrenol coverage  PTH is controlled  7  Colon cancer s/p colectomy in 2012: Follow up with Dr Roxanne Davenport  8  Abdominal pain: Due to pancreatitis? Lipase was normal during recent ER visit but CT showed mild pancreatitis  Will refer to GI in case it does not get better soon  Other: Right thigh abscess, DM, HLP, GERD, CHF, CAD, obesity  Plan:  - Stop AM Valsartan 80 mg  Continue the PM dose of 160 mg    - Decrease Metoprolol to 12 5 mg BID if still lightheaded  - Consult [facility] in case abdominal pain persists  - Follow up with cardiology and transplant  Please feel free to call me at [phone] or the dialysis unit ([facility]) at [phone] if you have any questions regarding this patient's care      Sincerely,     MD Elizabeth [facility]        Signatures   Electronically signed by : Laura Ash MD; Mar 16 2017  1:29PM EST                       (Author)

## 2018-01-12 NOTE — RESULT NOTES
Verified Results  Coumadin Flow Sheet [id number] 08:55AM Vilma Randolph     Test Name Result Flag Reference   Diagnosis DVT     Managing Provider INR Goal Range 2-3     INR 2 4     Current Dose 4 mg daily

## 2018-01-12 NOTE — MISCELLANEOUS
Physical Exam    Pulse Exam   Posterior tibialis: right 2+ and left 2+  Dorsalis pedis: right 2+ and left 2+  Normal Capillary Refill  Extremities: No lower extremity edema  Wound #1 Assessment wound #1 Location:, Right TMA, started on 12/11/2015, Care for this wound started on 1/12/2016  Wound Status: not healed  Non Healing Surgical: Full Thickness  Length: 2 2cm x Width: 3 6cm x Depth: 0 3cm   Total: 7 92sq cm   Wound Volume: 2 376cm3           Tissue type: Granulation and Slough   Color of Wound: Red - 30% and Yellow - 70%   Exudate Amount: Moderate   Exudate Type: Serosangiunous   Odor: None   Exudate Color: Yellow   Wound Edges: Rolled (epibolized)   Periwound Skin Condition: Erythematous, Scaly, Edema, scattered scabs, dry   Comments: 2 areas measured with epithelization between wound bed with x1 scabbed area  Physician/Provider Wound #1 Exam   I agree with the nursing assessment and documentation  This is non healing surgical wound from failed flap for TMA, the wound base is mixed fibro-granular, it is dry, it does not probe to muscle tendon or bone  There are no signs of infection  Wound #2 Assessment wound #2 Location:, right heel, started on 12/2015, Care for this wound started on 1/12/2016  Wound Status: not healed  PressureUlcer Grade: Stage II  Length: 2cm x Width: 3cm x Depth: 0 1cm   Total: 6sq cm   Wound Volume: 0 6cm3           Tissue type: Granulation and Slough   Color of Wound: Red - 20% and Yellow - 80%   Exudate Amount: Moderate   Exudate Type: Serosangiunous   Odor: None   Exudate Color: Yellow and pink   Wound Edges: Callous   Periwound Skin Condition: Macerated, Callus, Scaly, DRY        Physician/Provider Wound #2 Exam   I agree with the nursing assessment and documentation  This is a stage II heel pressure ulcer, there is mixed fibro-granular tissue, there are no sinus tracts wound does not probe to muscle tendon or bone  There are no signs of infection  Constitutional - General appearance: No acute distress, well appearing and well nourished  Pulmonary - Respiratory effort: No increased work of breathing or signs of respiratory distress  Cardiovascular - DP and PT are within normal limits  No lower extremity edema  Musculoskeletal - Nails are in good repair left foot  Muscle strength/tone: Normal    Neurologic - [place] 5 0 7 monofilament is absent bilateral feet  Psychiatric - Orientation to person, place, and time: Normal  Mood and affect: Normal       Wound Drsg  Orders/Instructions  Wound Identification Dressing Orders--Instructions:   Wound Identification and Instructions   Wound #1: right TMA    Wound Care Instructions  Discussed with Patient/Caregiver  Dressing Type: Collagenase (Santyl)  Wash with mild soap and water, normal saline, wound cleanser or as specified  Apply specified dressing to wound base/bed  To periwound apply: Vaseline  Secondary dressing apply: Gauze, and ABD  Secure with: Kerlix  Dressing change frequency: Daily   Wound #2: right heel    Wound Care Instructions  Discussed with Patient/Caregiver  Dressing Type: Collagenase (Santyl)  Wash with mild soap and water, normal saline, wound cleanser or as specified  Apply specified dressing to wound base/bed  To periwound apply: Vaseline  Secondary dressing apply: Gauze  Secure with: Kerlix  Dressing change frequency: Daily      Wound Goals  Wound Goals:   Healing Goals:   Fair healing potential secondary to moderate comorbid conditions  Wound depth will decrease by 25%   Patient will achieve full wound closure and return to full ADLs       Neptali Liz 1: Wound Nursing Care Plan   Impaired Tissue Integrity related to: right TMA, right heel   Risk for Infection related to open wound:   Goals   Patient will achieve 100% epithelialization:  Patient will maintain skin integrity:     Patient will demonstrate and verbalize knowledge of their disease process and management:  Patient will verbalize knowledge of and demonstrate adherence to interventions to achieve optimal nutrition required for wound healing:  Wound Nursing Care Interventions:   Provide moist wound healing:  Implement offloading measures (turn & reposition schedule/method, ortho shoes/boots, floating heels, crutches, specialty surfaces:  Teach and evaluate effectiveness of offloading measures:  Teach patient and/or family about disease process and management methods:  Teach patient and/or family about infection control measures (gloving, hand sanitation, MDROs, disposal of soiled dressings, antibiotic therapy): William Connelly    Evaluate effectiveness of all above measures every 4 weeks with Patient Specific CQI:    Other:  Care plan initiated 1/12/16      Signatures   Electronically signed by : Eugenie Boyd DPM; Feb 2 2016 10:04AM EST                       (Author)    Electronically signed by : Eugenie Boyd DPM; Feb 2 2016 10:55AM EST                       (Author)

## 2018-01-12 NOTE — RESULT NOTES
Verified Results  (1) URIC ACID 28Apr2017 12:46PM Lisbet Randolph     Test Name Result Flag Reference   Uric Acid, Serum 4 7 mg/dL  3 7-8 6   Therapeutic target for gout patients: <6 0     (1) HEMOGLOBIN A1C 28Apr2017 12:46PM Lisbet Randolph     Test Name Result Flag Reference   Hemoglobin A1c 6 1 % H 4 8-5 6   Pre-diabetes: 5 7 - 6 4           Diabetes: >6 4           Glycemic control for adults with diabetes: <7 0     (1) CBC/PLT/DIFF 28Apr2017 12:46PM Lisbet Randolph     Test Name Result Flag Reference   WBC 9 0 x10E3/uL  3 4-10 8   RBC 3 73 x10E6/uL L 4 14-5 80   Hemoglobin 12 6 g/dL  12 6-17 7   Hematocrit 36 8 % L 37 5-51 0   MCV 99 fL H 79-97   MCH 33 8 pg H 26 6-33 0   MCHC 34 2 g/dL  31 5-35 7   RDW 15 6 % H 12 3-15 4   Platelets 555 O57J6/XR  150-379   Neutrophils 73 %     Lymphs 10 %     Monocytes 13 %     Eos 3 %     Basos 0 %     Neutrophils (Absolute) 6 6 x10E3/uL  1 4-7 0   Lymphs (Absolute) 0 9 x10E3/uL  0 7-3 1   Monocytes(Absolute) 1 1 x10E3/uL H 0 1-0 9   Eos (Absolute) 0 2 x10E3/uL  0 0-0 4   Baso (Absolute) 0 0 x10E3/uL  0 0-0 2   Immature Granulocytes 1 %     Immature Grans (Abs) 0 1 x10E3/uL  0 0-0 1     (1) COMPREHENSIVE METABOLIC PANEL [id number] 75:47QE Kareem Austinwilfrid Wilks     Test Name Result Flag Reference   Glucose, Serum 131 mg/dL H 65-99   BUN 70 mg/dL H 6-24   Creatinine, Serum 12 64 mg/dL H 0 76-1 27   **Verified by repeat analysis**   BUN/Creatinine Ratio 6 L 9-20   Sodium, Serum 139 mmol/L  134-144   Potassium, Serum 4 6 mmol/L  3 5-5 2   Chloride, Serum 90 mmol/L L    Carbon Dioxide, Total 21 mmol/L  18-29   Calcium, Serum 7 8 mg/dL L 8 7-10 2   Protein, Total, Serum 6 1 g/dL  6 0-8 5   Albumin, Serum 3 5 g/dL  3 5-5 5   Globulin, Total 2 6 g/dL  1 5-4 5   A/G Ratio 1 3  1 2-2 2   Bilirubin, Total 0 3 mg/dL  0 0-1 2   Alkaline Phosphatase, S 165 IU/L H    AST (SGOT) 26 IU/L  0-40   ALT (SGPT) 26 IU/L  0-44   eGFR If NonAfricn Am 4 mL/min/1 73 L >59   eGFR If Africn Am 5 mL/min/1 73 L >59     (1) LIPID PANEL, FASTING 28Apr2017 12:46PM [facility] [employer]     Test Name Result Flag Reference   Cholesterol, Total 61 mg/dL L 100-199   Triglycerides 80 mg/dL  0-149   HDL Cholesterol 32 mg/dL L >39   VLDL Cholesterol Jose 16 mg/dL  5-40   LDL Cholesterol Calc 13 mg/dL  0-99   T  Chol/HDL Ratio 1 9 ratio units  0 0-5 0   T  Chol/HDL Ratio                                                             Men  Women                                               1/2 Avg  Risk  3 4    3 3                                                   Avg Risk  5 0    4 4                                                2X Avg  Risk  9 6    7 1                                                3X Avg  Risk 23 4   11 0     (LC) Sedimentation Rate-Westergren [id number] 12:46PM Vy [employer]     Test Name Result Flag Reference   Sedimentation Rate-Westergren 22 mm/hr  0-30     (LC) Rheumatoid Arthritis Factor 28Apr2017 12:46PM [facility] [employer]     Test Name Result Flag Reference   RA Latex Turbid  <10 0 IU/mL  0 0-13 9     [facility]) Cardiovascular Risk Assessment 28Apr2017 12:46PM V[facility] [employer]     Test Name Result Flag Reference   Interpretation NTAP     PDF Image Not applicable       [facility]) [facility] CKD Program [id number] 12:46PM V[facility] [employer]     Test Name Result Flag Reference   Interpretation Note     Medical Director's Note: Patient Last Name has been  corrected on 4/29/2017, was [facility] and [place] [facility] [place]  Please review this report in its entirety, since changes  to patient demographics may affect result interpretation(s)  and/or treatment/follow-up suggestions   -------------------------------  CHRONIC KIDNEY DISEASE:  EGFR, BLOOD PRESSURE, AND PROTEINURIA ASSESSMENT  The regression of eGFR with time is not statistically  significant  Current eGFR is 4 mL/min/1 73mE2 corresponding  to CKD stage 5  Multiply eGFR by 1 159 if patient is   American  Consider plans for renal replacement therapy    Potassium is within goal and has risen, was 4 1 and now is  4 6 mmol/L  Glycemic control (HB A1c: 6 1 %) is within goal   EGFR, BLOOD PRESSURE, AND PROTEINURIA TREATMENT SUGGESTIONS  -  Consider institution of renal replacement therapy  Guidelines recommend a target blood pressure of 140/90 mmHg  or less in CKD patients to reduce cardiovascular risk and  CKD progression  Assessment of albuminuria (urine  albumin:creatinine ratio or urine protein:creatinine ratio  preferred) is recommended at least annually in CKD patients  for staging and disease prognosis  EGFR, BLOOD PRESSURE, AND PROTEINURIA FOLLOW-UP  -  fasting Renal Panel within 2 months; Spot Urine Panel is  recommended by KDOQI guidelines, at least yearly; Hemoglobin  A1C within 6 months;  -  BONE and MINERAL ASSESSMENT  Calcium is below goal and has decreased, was 9 4 and now is  7 8 mg/dL  Carbon Dioxide is below goal and has decreased,  was 22 and now is 21 mmol/L  KDOQI guidelines recommend the  measurement of 25-hydroxy vitamin D in patients with CKD  BONE and MINERAL TREATMENT SUGGESTIONS  -  Interpretations require simultaneous measurements of serum  calcium and phosphorus  If not on alkali, begin sodium  bicarbonate, one 650 mg pill 2-3 times daily, otherwise  increase dose  BONE and MINERAL FOLLOW-UP  -  fasting Renal Panel within 2 months; fasting PTH with Renal  Panel is due; 25-Hydroxy Vitamin D is recommended by KDOQI  guidelines, at least yearly;  -  LIPIDS ASSESSMENT  LDL-C is optimal and has decreased, was 52 and now is 13  mg/dL  Triglyceride is normal and has decreased, was 162 and  now is 80 mg/dL  Non-HDL Cholesterol is optimal and has  decreased, was 84 and now is 29 mg/dL  HDL-C is low and has  not changed significantly, was 33 and now is 32 mg/dL  LIPIDS TREATMENT SUGGESTIONS  -  Therapeutic lifestyle changes are always valuable to  maintain optimal blood lipid status (diet, exercise, weight  management)  Continue statin if in use   Consider measurement  of LDL particle number or Apo B to adjudicate need for  further LDL lowering therapy  If statin cannot be tolerated  or increased, alternatives include use of an intestinal  agent (ezetimibe or bile acid sequestrant) or niacin  LIPIDS FOLLOW-UP  -  fasting Lipid Panel within 12 months;  -  ANEMIA ASSESSMENT  Hemoglobin is low and has risen, was 12 1 and now is 12 6  g/dL  Hemoglobin target assumes TONEY is not in use  ANEMIA TREATMENT SUGGESTIONS  -  Iron deficiency is a common cause of anemia in CKD  Recommend measurement of Ferritin and TSAT  ANEMIA FOLLOW-UP  -  Fe/TIBC (TSAT) and Ferritin with CBC is due; CBC within 3  months;  -------------------------------  DISCLAIMER  These assessments and treatment suggestions are provided as  a convenience in support of the physician-patient  relationship and are not intended to replace the physician's  clinical judgment  They are derived from the national  guidelines in addition to other evidence and expert opinion  The clinician should consider this information within the  context of clinical opinion and the individual patient  SEE GUIDANCE FOR CHRONIC KIDNEY DISEASE PROGRAM: National  Kidney Foundation Kidney Disease Outcomes Quality Initiative  (KDOQI (TM)), with its limitations and disclaimers, are at  www kidney  org/professionals/KDOQI  Kidney Disease Improving  Global Outcomes (KDIGO) clinical practice guidelines are at  http://kdigo  org/home/guidelines/  The members of  Hannah Ferrer national advisory panel are listed at  www  [employer] com  This program is intended for patients who  have been diagnosed with stages 3, 4, or pre-dialysis 5 CKD  It is not intended for children, pregnant patients, or  transplant patients  PDF Image          (LC) Antinuclear Antibodies, IFA [id number] 12:46PM Virginia Serna     Test Name Result Flag Reference   Antinuclear Antibodies, IFA Negative     Negative   <1:80 Borderline  1:80                                                      Positive   >1:80     (LC) Lyme Ab/Western Blot Reflex [id number] 12:46PM Maye Leeks     Test Name Result Flag Reference   Lyme IgG/IgM Ab <0 91 ISR  0 00-0 90   Negative         <0 91                                                 Equivocal  0 91 - 1 09                                                 Positive         >1 09   Lyme Disease Ab, Quant, IgM <0 80 index  0 00-0 79   Negative         <0 80                                                 Equivocal  0 80 - 1 19                                                 Positive         >1 19                  IgM levels may peak at 3-6 weeks post infection, then                  gradually decline

## 2018-01-12 NOTE — MISCELLANEOUS
History of Present Illness  Care Coordination: This patient was seen on peritoneal dialysis monthly rounding on 1/24/17  Below is a summary of his/her evaluation:    1  ESRD on PD: Kinetics adequate  Active on transplant list ~ pending being cancer free  2  Access: PD catheter without issues  3  HTN: BP is on the high side  Add Valsartan 80 mg in AM  Taking 160 mg in PM   4  Anemia: Hgb at goal despite no Epogen  5  MBD: Phos (6 2) is better overall but still above goal  PTH is controlled  6  Colon cancer s/p colectomy in 2012: Follow up with Dr Becka Carter  7  R thigh abscess: s/p I&D by Dr Rob Alvarez last week  Other: DM, HLP, GERD, CHF EF 40%    Plan:  1  Increase Valsartan to 80 mg in AM and 160 mg in HS  Please feel free to call me at [phone] or the dialysis unit ([facility]) at [phone] if you have any questions regarding this patient's care      Sincerely,     Lacey Welch MD  [facility]        Signatures   Electronically signed by : Lacey Welch MD; Jan 24 2017  3:25PM EST                       (Author)

## 2018-01-12 NOTE — PROCEDURES
Procedures by Eric Luo MD at 7/31/2017  1:11 PM      Author:  Eric Luo MD Service:  Interventional Radiology  Author Type:  Physician    Filed:  7/31/2017  1:12 PM Date of Service:  7/31/2017  1:11 PM Status:  Signed    :  Eric Luo MD (Physician)        Procedure Orders:       1  CENTRAL LINE [[id number]] ordered by Eric Luo MD at 07/31/17 1311                  Central Line  Insertion  Date/Time: 7/31/2017 1:11 PM  Performed by: Carlie Edwards  Authorized by: Deanna PLASCENCIA     Patient location:  [facility] protocol:     Procedure explained and questions answered to patient or proxy's satisfaction: yes      Imaging studies available: yes      Required blood products, implants, devices, and special equipment available:  yes      Immediately prior to procedure, a time out was called: yes      Patient identity confirmed:  Verbally with patient, arm band and provided demographic data  Pre-procedure details:     Hand hygiene: Hand hygiene performed prior to insertion      Sterile barrier technique: All elements of maximal sterile technique followed      Skin preparation:  2% chlorhexidine    Skin preparation agent: Skin preparation agent completely dried prior to procedure    Indications:     Central line indications: treatment therapy    Anesthesia (see MAR for exact dosages):      Anesthesia method:  Local infiltration    Local anesthetic:  Lidocaine 1% w/o epi  Procedure details:     Location:  Right internal jugular    Approach: percutaneous technique used      Catheter type:  Double lumen    Catheter size:  14 Fr    Ultrasound guidance: yes      Sterile ultrasound techniques: Sterile gel and sterile probe covers were used      Successful placement: yes      Vessel of catheter tip end:  RA  Post-procedure details:     Post-procedure:  Dressing applied and line sutured    Assessment:  Blood return through all ports    Post-procedure complications: none      Patient tolerance of procedure:   Tolerated well, no immediate complications                 Received for:Nahun Gold MD  Jul 31 2017  1:13PM [facility] Standard Time

## 2018-01-12 NOTE — RESULT NOTES
Verified Results  Coumadin Flow Sheet [id number] 09:01AM Philip Card     Test Name Result Flag Reference   Diagnosis DVT     Managing Provider INR Goal Range 2-3     INR 1 8     Current Dose      1 5 mg M/TH 3 mg all other days

## 2018-01-12 NOTE — RESULT NOTES
Verified Results  Coumadin Flow Sheet [id number] 11:02AM Lieutenant Chris     Test Name Result Flag Reference   Diagnosis DVT     Managing Provider INR Goal Range 2-3     INR 2 8     Current Dose      4mg MWFSu alt with 3mg all other days  sp/cma

## 2018-01-12 NOTE — RESULT NOTES
Verified Results  Coumadin Flow Sheet [id number] 12:00AM Boo Lua     Test Name Result Flag Reference   Diagnosis DVT     Managing Provider INR Goal Range 2-3     INR 3 1     Current Dose 4mg daily  sp/cma

## 2018-01-12 NOTE — PROCEDURES
Procedures by Es Bassett RN at 7/1/2017  12:24 PM      Author:  Es Bassett RN Service:  (none) Author Type:  Registered Nurse    Filed:  7/1/2017 12:27 PM Date of Service:  7/1/2017 12:24 PM Status:  Signed    :  Es Bassett RN (Registered Nurse)        Procedure Orders:       1  Insert PICC line [[id number]] ordered by Hu Lyon PA-C at 07/01/17 1048                  Insert PICC  line  Date/Time: 7/1/2017 12:00 PM  Performed by: RG EARL  Authorized by: Sveta Lugo     Patient location:  Bedside  Other Assisting Provider:  Yes (comment)    Consent:     Consent obtained:  Written (obtained by physician)  Universal protocol:     Procedure explained and questions answered to patient or proxy's satisfaction: yes      Relevant documents present and verified: yes      Imaging studies available: yes      Required blood products,  implants, devices, and special equipment available: yes      Site/side marked: yes      Immediately prior to procedure, a time out was called: yes      Patient identity confirmed:  Verbally with patient and arm band  Pre-procedure details:     Hand hygiene: Hand hygiene performed prior to insertion      Sterile barrier technique: All elements of maximal sterile technique followed      Skin preparation:  ChloraPrep    Skin preparation agent: Skin preparation agent completely dried prior to procedure    Indications:     PICC line indications: vascular access    Anesthesia (see MAR for exact dosages):      Anesthesia method:  Local infiltration    Local anesthetic:  Lidocaine 1% w/o epi  Procedure details:     Location:  Basilic    Vessel type: vein      Laterality:  Right    Approach: percutaneous technique used      Patient position:  Flat    Procedural supplies:  Double lumen    Catheter size:  5 Fr    Landmarks identified: yes      Ultrasound guidance: yes      Sterile ultrasound techniques: Sterile gel and sterile probe covers were used      Number of attempts:  1    Successful placement: yes      Vessel of catheter tip end:  Chest Xray needed to confirm placement    Total catheter length (cm):  51    Catheter out on skin (cm):  0    Max flow rate:  999ml/hr    Arm circumference:  41  Post-procedure details:     Post-procedure:  Dressing applied and securement device placed    Assessment:  Blood return through all ports and free fluid flow    Patient tolerance of procedure:   Tolerated well, no immediate complications                     Received for:Provider  EPIC   Jul 1 2017 12:27PM [facility] Standard Time

## 2018-01-13 VITALS
HEART RATE: 84 BPM | HEIGHT: 73 IN | RESPIRATION RATE: 18 BRPM | DIASTOLIC BLOOD PRESSURE: 70 MMHG | SYSTOLIC BLOOD PRESSURE: 116 MMHG | TEMPERATURE: 96.9 F | WEIGHT: 315 LBS | BODY MASS INDEX: 41.75 KG/M2

## 2018-01-13 VITALS
WEIGHT: 315 LBS | SYSTOLIC BLOOD PRESSURE: 122 MMHG | TEMPERATURE: 97.6 F | HEIGHT: 73 IN | HEART RATE: 76 BPM | BODY MASS INDEX: 41.75 KG/M2 | RESPIRATION RATE: 24 BRPM | DIASTOLIC BLOOD PRESSURE: 70 MMHG

## 2018-01-13 VITALS
SYSTOLIC BLOOD PRESSURE: 130 MMHG | DIASTOLIC BLOOD PRESSURE: 76 MMHG | WEIGHT: 315 LBS | RESPIRATION RATE: 20 BRPM | HEART RATE: 80 BPM | TEMPERATURE: 97.2 F | BODY MASS INDEX: 41.75 KG/M2 | HEIGHT: 73 IN

## 2018-01-13 VITALS
TEMPERATURE: 99.1 F | WEIGHT: 315 LBS | SYSTOLIC BLOOD PRESSURE: 142 MMHG | DIASTOLIC BLOOD PRESSURE: 84 MMHG | BODY MASS INDEX: 41.75 KG/M2 | OXYGEN SATURATION: 94 % | RESPIRATION RATE: 18 BRPM | HEART RATE: 74 BPM | HEIGHT: 73 IN

## 2018-01-13 VITALS
WEIGHT: 315 LBS | BODY MASS INDEX: 41.75 KG/M2 | RESPIRATION RATE: 24 BRPM | TEMPERATURE: 97.2 F | HEART RATE: 78 BPM | SYSTOLIC BLOOD PRESSURE: 120 MMHG | HEIGHT: 73 IN | DIASTOLIC BLOOD PRESSURE: 66 MMHG

## 2018-01-13 VITALS
DIASTOLIC BLOOD PRESSURE: 68 MMHG | HEIGHT: 73 IN | WEIGHT: 315 LBS | OXYGEN SATURATION: 95 % | HEART RATE: 62 BPM | BODY MASS INDEX: 41.75 KG/M2 | SYSTOLIC BLOOD PRESSURE: 110 MMHG

## 2018-01-13 NOTE — MISCELLANEOUS
Provider Comments  Provider Comments:   spoke with patient on the phone about no show appointment  He stated that he completely forgot and is very sorry  Stated that he wants to reschedule this appointment but needs to contact his cardiologist and and see when that appointment is so he doesn't make them for the same time   State that he "definitely needs to get in and see Dr Gurpreet Casper"      Signatures   Electronically signed by : TRUDI Valencia ; Mar 23 2017 10:58AM EST                       (Author)

## 2018-01-13 NOTE — RESULT NOTES
Verified Results  Coumadin Flow Sheet [id number] 01:43PM Radha Early     Test Name Result Flag Reference   Diagnosis DVT     Managing Provider INR Goal Range 2-3     INR 1 6

## 2018-01-13 NOTE — RESULT NOTES
Spoke with patient about message below. Patient stated understanding. She stated that she does need a refill of her HCQ. She is currently on 1.5 tablets daily. Pharmacy verified.  No further questions or concerns at this time. Call ended.    CBC order placed for 2 weeks.    Dr. Gloria carr to refill HCQ at 1.5 tablet daily?   Verified Results  Coumadin Flow Sheet [id number] 10:20AM Vaishali Piedra     Test Name Result Flag Reference   Diagnosis DVT     Managing Provider INR Goal Range 2-3     INR 2 1     Current Dose 4 mg daily     Comments      verbal from Wanda [phone]

## 2018-01-14 VITALS
HEIGHT: 73 IN | HEART RATE: 74 BPM | WEIGHT: 315 LBS | SYSTOLIC BLOOD PRESSURE: 108 MMHG | DIASTOLIC BLOOD PRESSURE: 64 MMHG | BODY MASS INDEX: 41.75 KG/M2

## 2018-01-14 VITALS
DIASTOLIC BLOOD PRESSURE: 62 MMHG | HEIGHT: 73 IN | BODY MASS INDEX: 41.75 KG/M2 | SYSTOLIC BLOOD PRESSURE: 118 MMHG | HEART RATE: 67 BPM | WEIGHT: 315 LBS | TEMPERATURE: 98.6 F | OXYGEN SATURATION: 95 %

## 2018-01-14 VITALS
HEART RATE: 82 BPM | SYSTOLIC BLOOD PRESSURE: 110 MMHG | DIASTOLIC BLOOD PRESSURE: 70 MMHG | TEMPERATURE: 98.2 F | WEIGHT: 315 LBS | RESPIRATION RATE: 18 BRPM | BODY MASS INDEX: 41.75 KG/M2 | HEIGHT: 73 IN

## 2018-01-14 VITALS
OXYGEN SATURATION: 98 % | DIASTOLIC BLOOD PRESSURE: 50 MMHG | BODY MASS INDEX: 41.75 KG/M2 | WEIGHT: 315 LBS | HEART RATE: 68 BPM | SYSTOLIC BLOOD PRESSURE: 104 MMHG | HEIGHT: 73 IN

## 2018-01-14 NOTE — RESULT NOTES
Verified Results  Coumadin Flow Sheet [id number] 02:37PM [place] Kamila     Test Name Result Flag Reference   Diagnosis DVT     Managing Provider INR Goal Range 2-3     INR 1 8     Current Dose      4mg MWFSu alt with 3mg all other days  sp/cma

## 2018-01-14 NOTE — RESULT NOTES
Verified Results  * CT ABDOMEN PELVIS W CONTRAST [id number] 02:29PM Desiree Patient Order Number: [id number]   Performing Comments: h/o kidney failure on peritoneal dialysis   r/o pancreatitis; STAT   - Patient Instructions: To schedule this appointment, please contact Central Scheduling at [phone]  Test Name Result Flag Reference   CT ABDOMEN PELVIS W CONTRAST (Report)     CT ABDOMEN AND PELVIS WITH IV CONTRAST     INDICATION: Epigastric pain  Pancreatitis  Umbilical pain radiating to pelvis  History of colon cancer  COMPARISON: 11/1/2016     TECHNIQUE: CT examination of the abdomen and pelvis was performed  Reformatted images were created in axial, sagittal, and coronal planes  Radiation dose length product (DLP) for this visit: 1122 87 mGy -cm  This examination, like all CT scans performed in the [facility], was performed utilizing techniques to minimize radiation dose exposure, including the use of    iterative reconstruction and automated exposure control  IV Contrast: 100 mL of iohexol (OMNIPAQUE)        Enteric Contrast: Enteric contrast was not administered  FINDINGS:     ABDOMEN     LOWER CHEST: No significant abnormalities identified in the lower chest      LIVER/BILIARY TREE: Unremarkable  No choledocholithiasis evident  GALLBLADDER: Cholelithiasis without evidence for cholecystitis  SPLEEN: Unremarkable  PANCREAS: Subtle trace soft tissue stranding seen about the pancreatic head and uncinate which could indicate mild pancreatitis  Correlate with lab values  Pancreas appears otherwise within normal limits, without evidence for ductal dilatation or    calcification  No peripancreatic collections  ADRENAL GLANDS: Unremarkable  KIDNEYS/URETERS: Bilateral moderate to severe renal atrophy  Renal vascular calcification present  No hydronephrosis   Accessory RIGHT renal artery noted arising from the mid abdominal aorta, 4 cm caudal to the main RIGHT renal artery  STOMACH AND BOWEL: Unremarkable  APPENDIX: No findings to suggest appendicitis  ABDOMINOPELVIC CAVITY: No ascites or free intraperitoneal air  No lymphadenopathy  Peritoneal dialysis catheter noted in the pelvis, midline  Minimal fluid in the pelvis  No loculated collections  VESSELS: Unremarkable for patient's age  PELVIS     REPRODUCTIVE ORGANS: Unremarkable for patient's age  URINARY BLADDER: Unremarkable  ABDOMINAL WALL/INGUINAL REGIONS: Generalized soft tissue infiltration throughout the anterior abdominal wall, appearing to be a chronic stable finding  Correlate with exam findings for soft tissue edema/inflammation  Stable paraumbilical hernia    defect  OSSEOUS STRUCTURES: No acute fracture or destructive osseous lesion  IMPRESSION:     Trace soft tissue stranding about the pancreatic head which may indicate mild pancreatitis  No ductal dilatation  Cholelithiasis without evidence for cholecystitis  No choledocholithiasis evident  Renal atrophy and renal vascular calcification  Stable finding  Peritoneal dialysis catheter noted in the pelvis  Small amounts of free fluid in the pelvis         Workstation performed: [id number]     Signed by:   Masood Gallagher MD   3/15/17

## 2018-01-14 NOTE — RESULT NOTES
Verified Results  (1) CBC/PLT/DIFF [id number] 11:48AM Vy Mendieta     Test Name Result Flag Reference   WBC 8 0 x10E3/uL  3 4-10 8   RBC 4 49 x10E6/uL  4 14-5 80   Hemoglobin 12 1 g/dL L 12 6-17 7   Hematocrit 38 9 %  37 5-51 0   MCV 87 fL  79-97   MCH 26 9 pg  26 6-33 0   MCHC 31 1 g/dL L 31 5-35 7   RDW 17 7 % H 12 3-15 4   Platelets 056 S59Q5/KG L 150-379   Neutrophils 74 %     Lymphs 11 %     Monocytes 7 %     Eos 7 %     Basos 1 %     Neutrophils (Absolute) 6 0 x10E3/uL  1 4-7 0   Lymphs (Absolute) 0 9 x10E3/uL  0 7-3 1   Monocytes(Absolute) 0 6 x10E3/uL  0 1-0 9   Eos (Absolute) 0 5 x10E3/uL H 0 0-0 4   Baso (Absolute) 0 1 x10E3/uL  0 0-0 2   Immature Granulocytes 0 %     Immature Grans (Abs) 0 0 x10E3/uL  0 0-0 1     (1) COMPREHENSIVE METABOLIC PANEL [id number] 19:67RA Jaye Serna     Test Name Result Flag Reference   Glucose, Serum 119 mg/dL H 65-99   BUN 61 mg/dL H 6-24   **Verified by repeat analysis**   Creatinine, Serum 14 38 mg/dL H 0 76-1 27   **Verified by repeat analysis**   eGFR If NonAfricn Am 3 mL/min/1 73 L >59   eGFR If Africn Am 4 mL/min/1 73 L >59   BUN/Creatinine Ratio 4 L 9-20   Sodium, Serum 140 mmol/L  134-144   Potassium, Serum 4 1 mmol/L  3 5-5 2   Chloride, Serum 92 mmol/L L    Carbon Dioxide, Total 22 mmol/L  18-29   Calcium, Serum 9 4 mg/dL  8 7-10 2   Protein, Total, Serum 6 1 g/dL  6 0-8 5   Albumin, Serum 3 8 g/dL  3 5-5 5   Globulin, Total 2 3 g/dL  1 5-4 5   A/G Ratio 1 7  1 1-2 5   Bilirubin, Total 0 3 mg/dL  0 0-1 2   Alkaline Phosphatase, S 143 IU/L H    AST (SGOT) 16 IU/L  0-40   ALT (SGPT) 15 IU/L  0-44     (1) HEMOGLOBIN A1C [id number] 11:48AM Jaye Serna     Test Name Result Flag Reference   Hemoglobin A1c 6 5 % H 4 8-5 6   Pre-diabetes: 5 7 - 6 4           Diabetes: >6 4           Glycemic control for adults with diabetes: <7 0     (1) LIPID PANEL, FASTING [id number] 11:48AM Jaye Serna     Test Name Result Flag Reference   Cholesterol, Total 117 mg/dL  100-199   Triglycerides 162 mg/dL H 0-149   HDL Cholesterol 33 mg/dL L >39   According to ATP-III Guidelines, HDL-C >59 mg/dL is considered a  negative risk factor for CHD  VLDL Cholesterol Jose 32 mg/dL  5-40   LDL Cholesterol Calc 52 mg/dL  0-99   T  Chol/HDL Ratio 3 5 ratio units  0 0-5 0   T  Chol/HDL Ratio                                                             Men  Women                                               1/2 Avg  Risk  3 4    3 3                                                   Avg Risk  5 0    4 4                                                2X Avg  Risk  9 6    7 1                                                3X Avg  Risk 23 4   11 0     [facility]) Cardiovascular Risk Assessment [id number] 11:48AM Lurkarma Jenkinsich     Test Name Result Flag Reference   Interpretation NTAP     PDF Image Not applicable       [facility]) [facility] CKD Program [id number] 11:48AM Lurleyi Frohlich     Test Name Result Flag Reference   Interpretation Note     Medical Director's Note: Patient Last Name has been  corrected on 9/10/2016, was [facility] and now is  [facility]  Please review this report in its entirety, since  changes to patient demographics may affect result  interpretation(s) and/or treatment/follow-up suggestions   -------------------------------  CHRONIC KIDNEY DISEASE:  EGFR, BLOOD PRESSURE, AND PROTEINURIA ASSESSMENT  Estimated GFR has not changed significantly, was 3 and now  is 3 mL/min/1 73mE2  Current eGFR corresponds to CKD stage  5  Multiply eGFR by 1 159 if patient is   Consider plans for renal replacement therapy  Potassium is  within goal and has decreased, was 4 8 and now is 4 1  mmol/L  Glycemic control (HB A1c: 6 5 %) is within goal   EGFR, BLOOD PRESSURE, AND PROTEINURIA TREATMENT SUGGESTIONS  -  Consider institution of renal replacement therapy    Guidelines recommend a target blood pressure of 140/90 mmHg  or less in CKD patients to reduce cardiovascular risk and  CKD progression  Assessment of albuminuria (urine  albumin:creatinine ratio or urine protein:creatinine ratio  preferred) is recommended at least annually in CKD patients  for staging and disease prognosis  EGFR, BLOOD PRESSURE, AND PROTEINURIA FOLLOW-UP  -  fasting Renal Panel within 2 months; Spot Urine Panel is  recommended by KDOQI guidelines, at least yearly; Hemoglobin  A1C within 6 months;  -  BONE and MINERAL ASSESSMENT  Calcium is within goal and has risen, was 8 1 and now is 9 4  mg/dL  Carbon Dioxide is within goal and has not changed  significantly, was 23 and now is 22 mmol/L  KDOQI guidelines  recommend the measurement of 25-hydroxy vitamin D in  patients with CKD  BONE and MINERAL TREATMENT SUGGESTIONS  -  Interpretations require simultaneous measurements of serum  calcium and phosphorus  BONE and MINERAL FOLLOW-UP  -  fasting PTH with Renal Panel is due; 25-Hydroxy Vitamin D is  recommended by KDOQI guidelines, at least yearly;  -  LIPIDS ASSESSMENT  LDL-C is optimal and has risen, was 42 and now is 52 mg/dL  Triglyceride is borderline high and has risen, was 56 and  now is 162 mg/dL  Non-HDL Cholesterol is optimal and has  risen, was 53 and now is 84 mg/dL  HDL-C is low and has not  changed significantly, was 35 and now is 33 mg/dL  LIPIDS TREATMENT SUGGESTIONS  -  Therapeutic lifestyle changes are always valuable to  maintain optimal blood lipid status (diet, exercise, weight  management)  Continue statin if in use  Consider measurement  of LDL particle number or Apo B to adjudicate need for  further LDL lowering therapy  If statin cannot be tolerated  or increased, alternatives include use of an intestinal  agent (ezetimibe or bile acid sequestrant), niacin, and/or  fish oil  LIPIDS FOLLOW-UP  -  fasting Lipid Panel within 12 months;  -  ANEMIA ASSESSMENT  Hemoglobin is low and has risen, was 9 5 and now is 12 1  g/dL  Hemoglobin target assumes TONEY is not in use    ANEMIA TREATMENT SUGGESTIONS  -  Iron deficiency is a common cause of anemia in CKD  Recommend measurement of Ferritin and TSAT  ANEMIA FOLLOW-UP  -  Fe/TIBC (TSAT) and Ferritin with CBC is due; CBC within 3  months;  -------------------------------  DISCLAIMER  These assessments and treatment suggestions are provided as  a convenience in support of the physician-patient  relationship and are not intended to replace the physician's  clinical judgment  They are derived from the national  guidelines in addition to other evidence and expert opinion  The clinician should consider this information within the  context of clinical opinion and the individual patient  SEE GUIDANCE FOR CHRONIC KIDNEY DISEASE PROGRAM: National  Kidney Foundation Kidney Disease Outcomes Quality Initiative  (KDOQI (TM)), with its limitations and disclaimers, are at  www kidney  org/professionals/KDOQI  Kidney Disease Improving  Global Outcomes (KDIGO) clinical practice guidelines are at  http://kdigo  org/home/guidelines/  The members of  Hannah Ferrer national advisory panel are listed at  www  [employer] com  This program is intended for patients who  have been diagnosed with stages 3, 4, or pre-dialysis 5 CKD  It is not intended for children, pregnant patients, or  transplant patients  PDF Image

## 2018-01-14 NOTE — RESULT NOTES
Verified Results  Coumadin Flow Sheet [id number] 01:28PM Madi Ng     Test Name Result Flag Reference   Diagnosis DVT     Managing Provider INR Goal Range 2-3     INR 2 3     Current Dose 3mg daily  sp/cma

## 2018-01-15 NOTE — RESULT NOTES
Verified Results  Coumadin Flow Sheet [id number] 12:55PM Jami Tamayo     Test Name Result Flag Reference   Diagnosis DVT     Managing Provider INR Goal Range 2-3     INR 2 8     Current Dose      4mg M/W/F/Avila alt with 3mg all other days  sp/cma

## 2018-01-15 NOTE — PROGRESS NOTES
History of Present Illness  Care Coordination Encounter Information:   Type of Encounter: Telephonic   Last Office Visit: 1/11/16   Spoke to Patient  Care Coordination SL Nurse [facility]:   The reason for call is to discuss outreach for follow up/needed services  Spoke with Isis Muñiz regarding his non-healing right foot wound, s/p transmetatarsal amputation  He is currently receiving the services of wound management and home health nurses for this  His blood sugars remain high in the morning  He is unwilling to change his diet to include daily vegetable servings  He states he is on the transplant list for a kidney but he would be prohibited from this currently due to his non-healing wound  He is scheduled to see his nephrologist next week  He states there has been discussion regarding a possible pancreas transplant as well  Active Problems    1  Allergic rhinitis (477 9) (J30 9)   2  Anemia (285 9) (D64 9)   3  Benign essential hypertension (401 1) (I10)   4  Cancer, colon (153 9) (C18 9)   5  Cardiomyopathy (425 4) (I42 9)   6  Closed Fracture Of The Left Tibial Spine (823 00)   7  Decubitus ulcer of heel, right, stage II (583 16,121 64) (E48 561)   8  Delayed surgical wound healing of foot amputation stump (997 69,998 83)   (T87 89,T81 89XA)   9  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 60) (E11 40)   10  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 60) (E11 40)   11  Diabetic neuropathy (250 60,357 2) (E11 40)   12  DM (diabetes mellitus), type 2 with renal complications (845 26) (K65 17)   13  Dyslipidemia (272 4) (E78 5)   14  End stage renal disease (585 6) (N18 6)   15  Esophageal reflux (530 81) (K21 9)   16  Gout (274 9) (M10 9)   17  Hypercalcemia (275 42) (E83 52)   18  Hyperphosphatemia (275 3) (E83 39)   19  Hypocalcemia (275 41) (E83 51)   20  Lower extremity deep venous thrombosis (453 40) (I82 409)   21  Lumbar radiculopathy (724 4) (M54 16)   22  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)   23  Peritoneal Dialysis   24  Proteinuria (791 0) (R80 9)   25  Status post transmetatarsal amputation of right foot (V49 73) (Z89 431)   26  Type 2 diabetes mellitus with foot ulcer (250 80,707 15) (E11 621,L97 509)   27  Type 2 diabetes mellitus, uncontrolled, with renal complications (317 13)    (E11 29,E11 65)    Past Medical History    1  History of ESRD on dialysis (585 6,V45 11) (N18 6,Z99 2)   2  History of blood clots (V12 51) (Z86 718)   3  History of diabetes mellitus (V12 29) (Z86 39)   4  History of hypotension (V12 59) (Z86 79)   5  History of kidney disease (V13 09) (L80 126)    Surgical History    1  History of Arteriovenous Surgery Creation Of A-V Fistula   2  History of Colon Surgery   3  Peritoneal Dialysis   4  History of Surgery Foot Amputation Transmetatarsal    Family History    1  Family history of Colon Cancer (V16 0)   2  Family history of Coronary Artery Disease (V17 49)    Social History    · Denied: History of Alcohol Use (History)   · Daily Tea Consumption (___ Cups/Day)   · Never A Smoker   · No drug use    Current Meds    1  Gabapentin 100 MG Oral Capsule; TAKE 1 CAPSULE 3 times daily; Therapy: 26AYK9928 to (Nikos Dominguez)  Requested for: 62ZSE5993; Last   Rx:70Yij9877 Ordered   2  NovoLOG FlexPen 100 UNIT/ML Subcutaneous Solution Pen-injector; use as directed   sliding scale prior to meals, 1-7 units with meals depending upon readings; Therapy: 59XDY0465 to (Last Rx:18Vkt9674)  Requested for: 77OSR9730 Ordered    3  Nephrocaps 1 MG Oral Capsule; TAKE 1 CAPSULE DAILY  Requested for: 60Adw6290;   Last Rx:76Emy7280 Ordered    4  Fosrenol 1000 MG Oral Tablet Chewable; CHEW AND SWALLOW 1 TABLET 3 TIMES   DAILY WITH MEALS; Therapy: 53BEP1842 to (Evaluate:13Jun2015)  Requested for: 93LBW4643 Recorded    5  HydrALAZINE HCl - 25 MG Oral Tablet; TAKE 1 TABLET TWICE DAILY; Therapy: 42CGX0669 to Recorded    6   Warfarin Sodium 4 MG Oral Tablet; TAKE 1 TABLET DAILY; Therapy: 44UVW0885 to (Last Rx:15Jan2016)  Requested for: 96NRL0495 Ordered    7  ProAir  (90 Base) MCG/ACT Inhalation Aerosol Solution; USE 1 PUFF BY MOUTH   TWICE DAILY AS NEEDED FOR COPD/SHORTNESS OF BREATH; Therapy: 35KWL4945 to (Evaluate:18Feb2016)  Requested for: 84VYT0342; Last   Rx:20Nov2015 Ordered    8  Accu-Chek Multiclix Lancets Miscellaneous; Drum, test blood sugar QID; Therapy: 68MTH4415 to (Last Rx:04Jun2015)  Requested for: 34CZO4487 Ordered   9  [employer] In [employer]; Test 4 times daily, insulin requiring DM; Therapy: 38OOZ3068 to (Last Rx:04Jun2015)  Requested for: 53LUN4837 Ordered   10  Chavo Microlet Lancets Miscellaneous; Test twice daily, 250; Therapy: 83AJS2951 to (Last Rx:11Feb2014)  Requested for: 83DMV2579 Ordered   11  BD Pen Needle Janene U/F 32G X 4 MM Miscellaneous; as directed; Therapy: 12GIV8755 to (Last Rx:04Jun2015)  Requested for: 91WHM5699 Ordered    12  Coumadin 3 MG Oral Tablet (Warfarin Sodium); 1 daily except for 1/2 tablet on Monday    and Thursday; Therapy: (Recorded:11Jan2016) to Recorded   13  Docusate Sodium 100 MG Oral Capsule; Therapy: (97 348672) to Recorded   14  Gentamicin Sulfate (Topical) 0 1 % OINT; use as dir; Therapy: (97 279194) to Recorded   15  Lantus SoloStar 100 UNIT/ML Subcutaneous Solution Pen-injector; 12 units daily; Therapy: (Recorded:11Jan2016) to Recorded   16  Lidocaine HCl (Local Anesth ) 4 % SOLN; Apply prn to wound(s) prior to debridement for    pain control; Therapy: (Recorded:12Jan2016) to Recorded   17  Midodrine HCl - 5 MG Oral Tablet; 1 twice daily; Therapy: (97 437642) to Recorded   18  PriLOSEC 20 MG Oral Capsule Delayed Release (Omeprazole); 1 every day; Therapy: (96 757807) to Recorded   19  Renvela 800 MG Oral Tablet; TAKE 4 TABLET 3 times daily; Therapy: 92Via0040 to Recorded   20   Santyl 250 UNIT/GM External Ointment; APPLY TO AFFECTED AREA(S) ONCE DAILY AS    DIRECTED; Therapy: ([facility]) to Recorded   21  Sennosides 17 2 MG TABS; TAKE AS DIRECTED; Therapy: (Recorded:08Jan2016) to Recorded    Allergies    1  Dilaudid SOLN   2  Morphine Sulfate TABS    End of Encounter Meds    1  Gabapentin 100 MG Oral Capsule; TAKE 1 CAPSULE 3 times daily; Therapy: 92IFZ0828 to (Britta Nolen)  Requested for: 28ACA8184; Last   Rx:14May2015 Ordered   2  NovoLOG FlexPen 100 UNIT/ML Subcutaneous Solution Pen-injector; use as directed   sliding scale prior to meals, 1-7 units with meals depending upon readings; Therapy: 24OZM3855 to (Last Rx:14May2015)  Requested for: 20GXY6715 Ordered    3  Nephrocaps 1 MG Oral Capsule; TAKE 1 CAPSULE DAILY  Requested for: 22Zhy4921;   Last Rx:24Ghr7153 Ordered    4  Fosrenol 1000 MG Oral Tablet Chewable; CHEW AND SWALLOW 1 TABLET 3 TIMES   DAILY WITH MEALS; Therapy: 23TZT9261 to (Evaluate:13Jun2015)  Requested for: 21EDR0058 Recorded    5  HydrALAZINE HCl - 25 MG Oral Tablet; TAKE 1 TABLET TWICE DAILY; Therapy: 03PAB0155 to Recorded    6  Warfarin Sodium 4 MG Oral Tablet; TAKE 1 TABLET DAILY; Therapy: 61XFO2450 to (Last Rx:15Jan2016)  Requested for: 31QYO7183 Ordered    7  ProAir  (90 Base) MCG/ACT Inhalation Aerosol Solution; USE 1 PUFF BY MOUTH   TWICE DAILY AS NEEDED FOR COPD/SHORTNESS OF BREATH; Therapy: 75DMG7580 to (Evaluate:18Feb2016)  Requested for: 10DAY8571; Last   Rx:20Nov2015 Ordered    8  Accu-Chek Multiclix Lancets Miscellaneous; Drum, test blood sugar QID; Therapy: 99ZNV5214 to (Last Rx:04Jun2015)  Requested for: 99XWF0855 Ordered   9  [address] [employer]; Test 4 times daily, insulin requiring DM; Therapy: 02RCT8333 to (Last Rx:04Jun2015)  Requested for: 71DNQ2343 Ordered   10  Chavo Microlet Lancets Miscellaneous; Test twice daily, 250; Therapy: 99EWK1374 to (Last Rx:20Kfn5090)  Requested for: 38RWY0795 Ordered   11   BD Pen Needle Janene U/F 32G X 4 MM Miscellaneous; as directed; Therapy: 39IAD0652 to (Last Rx:04Jun2015)  Requested for: 03NLR2445 Ordered    12  Coumadin 3 MG Oral Tablet (Warfarin Sodium); 1 daily except for 1/2 tablet on Monday    and Thursday; Therapy: (Recorded:11Jan2016) to Recorded   13  Docusate Sodium 100 MG Oral Capsule; Therapy: (0660 303 88 06) to Recorded   14  Gentamicin Sulfate (Topical) 0 1 % OINT; use as dir; Therapy: (0660 303 88 06) to Recorded   15  Lantus SoloStar 100 UNIT/ML Subcutaneous Solution Pen-injector; 12 units daily; Therapy: (Recorded:11Jan2016) to Recorded   16  Lidocaine HCl (Local Anesth ) 4 % SOLN; Apply prn to wound(s) prior to debridement for    pain control; Therapy: (Recorded:12Jan2016) to Recorded   17  Midodrine HCl - 5 MG Oral Tablet; 1 twice daily; Therapy: (0660 303 88 06) to Recorded   18  PriLOSEC 20 MG Oral Capsule Delayed Release (Omeprazole); 1 every day; Therapy: (0660 303 88 06) to Recorded   19  Renvela 800 MG Oral Tablet; TAKE 4 TABLET 3 times daily; Therapy: 02Ido2699 to Recorded   20  Santyl 250 UNIT/GM External Ointment; APPLY TO AFFECTED AREA(S) ONCE DAILY AS    DIRECTED; Therapy: (0660 303 88 06) to Recorded   21  Sennosides 17 2 MG TABS; TAKE AS DIRECTED; Therapy: (Recorded:08Jan2016) to Recorded    Future Appointments    Date/Time Provider Specialty Site   02/02/2016 09:00 AM Patricia Youngblood     Patient Care Team    Care Team Member Role Specialty Office Number   Marty Valiente MD Specialist Vascular Surgery [phone]   [facility] Specialist Vascular Surgery [phone]   Martha Lozada MD Specialist Colon and Rectal Surgery [phone]     Signatures   Electronically signed by : Dallas Fontaine RN; Jan 20 2016  1:23PM EST                       (Author)    Electronically signed by :  Dallas Fontaine RN; Jan 20 2016  1:23PM EST (Author)

## 2018-01-15 NOTE — RESULT NOTES
Verified Results  Coumadin Flow Sheet [id number] 03:31PM Sylwia Jama     Test Name Result Flag Reference   Diagnosis DVT     Managing Provider INR Goal Range 2-3     INR 3 6     Current Dose 3 5 mg daily     Comments      verbal received from RCA

## 2018-01-15 NOTE — RESULT NOTES
Discussion/Summary   Your bloodwork is stable   Glucose control is very good  - Dr Ana Wakefield     Verified Results  (1) TSH WITH FT4 REFLEX 21Jun2017 09:57AM [place]     Test Name Result Flag Reference   TSH 2 970 uIU/mL  0 450-4 500     (1) CBC/PLT/DIFF [id number] 09:57AM [place]     Test Name Result Flag Reference   WBC 8 5 x10E3/uL  3 4-10 8   RBC 3 14 x10E6/uL L 4 14-5 80   Hemoglobin 10 3 g/dL L 12 6-17 7   Hematocrit 31 9 % L 37 5-51 0    fL H 79-97   MCH 32 8 pg  26 6-33 0   MCHC 32 3 g/dL  31 5-35 7   RDW 15 5 % H 12 3-15 4   Platelets 851 K19G2/PX L 150-379   Neutrophils 79 %     Lymphs 8 %     Monocytes 9 %     Eos 2 %     Basos 1 %     Neutrophils (Absolute) 6 8 x10E3/uL  1 4-7 0   Lymphs (Absolute) 0 7 x10E3/uL  0 7-3 1   Monocytes(Absolute) 0 8 x10E3/uL  0 1-0 9   Eos (Absolute) 0 2 x10E3/uL  0 0-0 4   Baso (Absolute) 0 1 x10E3/uL  0 0-0 2   Immature Granulocytes 1 %     Immature Grans (Abs) 0 0 x10E3/uL  0 0-0 1     (1) COMPREHENSIVE METABOLIC PANEL [id number] 59:35BL Hoteddakin, Makenzie Medici     Test Name Result Flag Reference   Glucose, Serum 77 mg/dL  65-99    mg/dL HH 6-24   Creatinine, Serum 12 39 mg/dL H 0 76-1 27   **Verified by repeat analysis**   BUN/Creatinine Ratio 8 L 9-20   Sodium, Serum 144 mmol/L  134-144   Potassium, Serum 4 5 mmol/L  3 5-5 2   Chloride, Serum 94 mmol/L L    Carbon Dioxide, Total 21 mmol/L  18-29   Calcium, Serum 7 7 mg/dL L 8 7-10 2   Protein, Total, Serum 5 6 g/dL L 6 0-8 5   Albumin, Serum 4 1 g/dL  3 5-5 5   Globulin, Total 1 5 g/dL  1 5-4 5   A/G Ratio 2 7 H 1 2-2 2   Bilirubin, Total 0 2 mg/dL  0 0-1 2   Alkaline Phosphatase, S 118 IU/L H    AST (SGOT) 10 IU/L  0-40   ALT (SGPT) 18 IU/L  0-44   eGFR If NonAfricn Am 4 mL/min/1 73 L >59   eGFR If Africn Am 5 mL/min/1 73 L >59     (1) HEMOGLOBIN A1C 21Jun2017 09:57AM Makenzie Serna     Test Name Result Flag Reference   Hemoglobin A1c 6 0 % H 4 8-5 6   Pre-diabetes: 5 7 - 6 4 Diabetes: >6 4           Glycemic control for adults with diabetes: <7 0     (1) LIPID PANEL, FASTING 21Jun2017 09:57AM Shaji Serna     Test Name Result Flag Reference   Cholesterol, Total 96 mg/dL L 100-199   Triglycerides 54 mg/dL  0-149   HDL Cholesterol 50 mg/dL  >39   VLDL Cholesterol Jose 11 mg/dL  5-40   LDL Cholesterol Calc 35 mg/dL  0-99   T  Chol/HDL Ratio 1 9 ratio units  0 0-5 0   T  Chol/HDL Ratio                                                             Men  Women                                               1/2 Avg  Risk  3 4    3 3                                                   Avg Risk  5 0    4 4                                                2X Avg  Risk  9 6    7 1                                                3X Avg  Risk 23 4   11 0     [facility]) Cardiovascular Risk Assessment 21Jun2017 09:57AM Cedric Cabrera     Test Name Result Flag Reference   Interpretation NTAP     PDF Image Not applicable       [facility]) [facility] CKD Program 21Jun2017 09:57AM Shaji Serna     Test Name Result Flag Reference   Interpretation Note     -------------------------------  CHRONIC KIDNEY DISEASE:  EGFR, BLOOD PRESSURE, AND PROTEINURIA ASSESSMENT  The regression of eGFR with time is not statistically  significant  Current eGFR is 4 mL/min/1 73mE2 corresponding  to CKD stage 5  Multiply eGFR by 1 159 if patient is   American  Consider plans for renal replacement therapy  Potassium is within goal and has not changed significantly,  was 4 6 and now is 4 5 mmol/L  Glycemic control (HB A1c: 6 0  EGFR, BLOOD PRESSURE, AND PROTEINURIA TREATMENT SUGGESTIONS  -  Consider institution of renal replacement therapy  Guidelines recommend a target blood pressure of 140/90 mmHg  or less in CKD patients to reduce cardiovascular risk and  CKD progression   Assessment of albuminuria (urine  albumin:creatinine ratio or urine protein:creatinine ratio  preferred) is recommended at least annually in CKD patients  for staging and disease prognosis  EGFR, BLOOD PRESSURE, AND PROTEINURIA FOLLOW-UP  -  fasting Renal Panel within 2 months; Spot Urine Panel is  recommended by KDOQI guidelines, at least yearly; Hemoglobin  A1C within 6 months;  -  BONE and MINERAL ASSESSMENT  Calcium is below goal and has not changed significantly, was  7 8 and now is 7 7 mg/dL  Carbon Dioxide is below goal and  has not changed significantly, was 21 and now is 21 mmol/L   KDOQI guidelines recommend the measurement of 25-hydroxy  vitamin D in patients with CKD  BONE and MINERAL TREATMENT SUGGESTIONS  -  Interpretations require simultaneous measurements of serum  calcium and phosphorus  If not on alkali, begin sodium  bicarbonate, one 650 mg pill 2-3 times daily, otherwise  increase dose  BONE and MINERAL FOLLOW-UP  -  fasting Renal Panel within 2 months; fasting PTH with Renal  Panel is due; 25-Hydroxy Vitamin D is recommended by KDOQI  guidelines, at least yearly;  -  LIPIDS ASSESSMENT  LDL-C is optimal and has risen, was 13 and now is 35 mg/dL  Triglyceride is normal and has decreased, was 80 and now is  54 mg/dL  Non-HDL Cholesterol is optimal and has risen, was  29 and now is 46 mg/dL  HDL-C is normal and has risen, was  32 and now is 50 mg/dL  LIPIDS TREATMENT SUGGESTIONS  -  Therapeutic lifestyle changes are always valuable to  maintain optimal blood lipid status (diet, exercise, weight  management)  Continue statin if in use  Consider measurement  of LDL particle number or Apo B to adjudicate need for  further LDL lowering therapy  If statin cannot be tolerated  or increased, alternatives include use of an intestinal  agent (ezetimibe or bile acid sequestrant) or niacin  LIPIDS FOLLOW-UP  -  fasting Lipid Panel within 12 months;  -  ANEMIA ASSESSMENT  Hemoglobin is low and has decreased, was 12 6 and now is  10 3 g/dL  Hemoglobin target assumes TONEY is not in use  High  MCV suggests substrate deficiency, drug effect or  reticulocytosis   Hemoglobin has fallen by more than 2 g/dL  since the last measurement  Consider clinical evaluation for  hemolysis and blood loss  ANEMIA TREATMENT SUGGESTIONS  -  Iron deficiency is a common cause of anemia in CKD  Recommend measurement of Ferritin and TSAT  ANEMIA FOLLOW-UP  -  Fe/TIBC (TSAT) and Ferritin with CBC is due; CBC within 3  months;  -------------------------------  DISCLAIMER  These assessments and treatment suggestions are provided as  a convenience in support of the physician-patient  relationship and are not intended to replace the physician's  clinical judgment  They are derived from the national  guidelines in addition to other evidence and expert opinion  The clinician should consider this information within the  context of clinical opinion and the individual patient  SEE GUIDANCE FOR CHRONIC KIDNEY DISEASE PROGRAM: National  Kidney Foundation Kidney Disease Outcomes Quality Initiative  (KDOQI (TM)), with its limitations and disclaimers, are at  www kidney  org/professionals/KDOQI  Kidney Disease Improving  Global Outcomes (KDIGO) clinical practice guidelines are at  http://kdigo  org/home/guidelines/  The members of  Hannah Ferrer national advisory panel are listed at  www  [employer] com  This program is intended for patients who  have been diagnosed with stages 3, 4, or pre-dialysis 5 CKD  It is not intended for children, pregnant patients, or  transplant patients  PDF Image

## 2018-01-15 NOTE — RESULT NOTES
Verified Results  Coumadin Flow Sheet [id number] 01:10PM Layla Linn     Test Name Result Flag Reference   Diagnosis DVT     Managing Provider INR Goal Range 2-3     INR 2 3     Current Dose      4mgMWFSu alt with 3mg all other days  sp/cma

## 2018-01-15 NOTE — RESULT NOTES
Verified Results  Coumadin Flow Sheet [id number] 03:22PM Juliana Citrin     Test Name Result Flag Reference   Diagnosis DVT     Managing Provider INR Goal Range 2-3     INR 1 6     Current Dose      4 mg M/W/F 3 mg all other days

## 2018-01-16 NOTE — RESULT NOTES
Verified Results  Coumadin Flow Sheet [id number] 03:28PM Madi Ng     Test Name Result Flag Reference   Diagnosis DVT     Managing Provider INR Goal Range 2-3     INR 2 6     Current Dose 4 mg daily

## 2018-01-16 NOTE — RESULT NOTES
Verified Results  Coumadin Flow Sheet [id number] 12:00AM Cezar Augustine     Test Name Result Flag Reference   Diagnosis DVT     Managing Provider INR Goal Range 2-3     INR 2 9     Current Dose 4mg daily  sp/cma

## 2018-01-16 NOTE — RESULT NOTES
Verified Results  Coumadin Flow Sheet [id number] 09:29AM Nelson Espinal     Test Name Result Flag Reference   Diagnosis DVT     Managing Provider INR Goal Range 2-3     INR 1 8     Current Dose      4 mg M/W/F/KHANNA 3 mg all other days  Comments      Verbal results from pt

## 2018-01-16 NOTE — RESULT NOTES
Verified Results  VAS LOWER LIMB VENOUS DUPLEX STUDY, COMPLETE BILATERAL [id number] 10:04AM Husam Lucia Order Number: [id number]    - Patient Instructions: To schedule this appointment, please contact Central Scheduling at [phone]   Order Number: [id number]    - Patient Instructions: To schedule this appointment, please contact Central Scheduling at [phone]  Test Name Result Flag Reference   VAS LOWER LIMB VENOUS DUPLEX STUDY, COMPLETE BILATERAL (Report)     [facility] REPORT   CLINICAL:   Indications: Venous Embolism and Thrombosis of Unspecified Deep Vessels of   Lower Extremity [I82 [id number]]  Operative History:   2015-12-14 Right metatarsal amputation   2013-08-06 Left Autogenous radial-cephalic direct wrist access (brescia-roland   fistula)   colon tumor removal   Hernia repair   Right second toe & half of third toe amputation   Clinical: Patient is following up on past DVT  FINDINGS:      Segment     Right      Left                 Impression    Impression                 GSV Prox Thigh Normal (Patent) Normal (Patent)              CFV       Normal (Patent) E1  Non Occlusive Thrombus (Chronic)    FV Prox     Normal (Patent) E1  Non Occlusive Thrombus (Chronic)    FV Mid     Normal (Patent) Normal (Patent)              FV Dist     Normal (Patent) Normal (Patent)              PFV       Normal (Patent) E1  Non Occlusive Thrombus (Chronic)    Popliteal    Normal (Patent) E1  Non Occlusive Thrombus (Chronic)    PostTibial   Normal (Patent)                                CONCLUSION:   Impression:   RIGHT LOWER LIMB: NORMAL   No evidence of acute or chronic deep vein thrombosis  No evidence of superficial thrombophlebitis noted  Doppler evaluation shows a normal response to augmentation maneuvers  Popliteal, posterior tibial and anterior tibial arterial Doppler waveforms are   biphasic        LEFT LOWER LIMB: ABNORMAL   Evidence of chronic deep vein thrombosis in common femoral vein, proximal and   deep femoral vein,popliteal vein  No evidence of superficial thrombophlebitis noted  Doppler evaluation shows a normal response to augmentation maneuvers  Popliteal, posterior tibial and anterior tibial arterial Doppler waveforms are   monophasic  Possible posterior tibial, and peroneal disease in left calf        SIGNATURE:   Electronically Signed by: Randolph Luo MD, RPVI on 2016-09-06 07:45:58 PM

## 2018-01-16 NOTE — RESULT NOTES
Verified Results  Coumadin Flow Sheet [id number] 02:15PM Kiko Garrett     Test Name Result Flag Reference   Diagnosis DVT     Managing Provider INR Goal Range 2-3     INR 2 6     Current Dose 3mg daily  sp/cma

## 2018-01-16 NOTE — RESULT NOTES
Verified Results  Coumadin Flow Sheet [id number] 11:25AM Bushra Cuadra     Test Name Result Flag Reference   Diagnosis DVT     Managing Provider INR Goal Range 2-3     INR 3 5     Current Dose      5mg T/Th alt with 4mg all other days  sp/cma

## 2018-01-16 NOTE — RESULT NOTES
Verified Results  Coumadin Flow Sheet [id number] 01:40PM Areli Paz     Test Name Result Flag Reference   Diagnosis DVT     Managing Provider INR Goal Range 2-3     INR 2 7     Current Dose      5 mg on TUE/THURS 4 mg all other days

## 2018-01-16 NOTE — RESULT NOTES
Verified Results  Coumadin Flow Sheet [id number] 02:48PM Sarbjit Muhammad     Test Name Result Flag Reference   Diagnosis DVT     Managing Provider INR Goal Range 2-3     INR 2 4     Current Dose      3mg MWF alt with 2mg all other days  sp/cma

## 2018-01-16 NOTE — RESULT NOTES
Verified Results  Coumadin Flow Sheet [id number] 12:20PM Tabatha Salas     Test Name Result Flag Reference   Diagnosis DVT     Managing Provider INR Goal Range 2-3     INR 2 1     Current Dose      4mg m/w/f/abdi-3mg all other days

## 2018-01-17 NOTE — PROGRESS NOTES
Assessment    1  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 60) (E11 40)   2  Decubitus ulcer of heel, right, stage II (657 41,867 75) (U88 703)   3  Status post transmetatarsal amputation of right foot (V49 73) (Z89 431)   4  Delayed surgical wound healing of foot amputation stump (155 62,419 26)   (T87 89,T81 89XA)    Plan  Delayed surgical wound healing of foot amputation stump    · Collagenase (Santyl) instructions given; Status:Complete;   Done: 51HBY1754 10:09AM   Ordered; For:Delayed surgical wound healing of foot amputation stump; Ordered By:Pat Camilo;   · Dermagran instructions given; Status:Complete;   Done: 08EJW5038 10:09AM   Ordered; For:Delayed surgical wound healing of foot amputation stump; Ordered By:Pat Camilo;   · Follow-up visit in 1 week Evaluation and Treatment  Follow-up  Status: Hold For -  Scheduling  Requested for: 85GMD9386   Ordered; For: Delayed surgical wound healing of foot amputation stump; Ordered By: Kieran Flanagan Performed:  Due: 12UPI6334    Wound Care Orders/Instructions    Wound Identification and Instructions   Wound #1: right TMA    Wound Care Instructions  Discussed with Patient/Caregiver  Dressing Type: Buckley Burows with mild soap and water, normal saline, wound cleanser or as specified  Apply 4% Topical Lidocaine anesthetic solution PRN to wound/ulcer prior to debridement for pain control  Apply specified dressing to wound base/bed  Secondary dressing apply: Gauze, and ABD  Secure with: Kerlix  Dressing change frequency: Three times per week   Wound #2: right heel    Wound Care Instructions  Discussed with Patient/Caregiver  Dressing Type: Collagenase (Santyl)  Wash with mild soap and water, normal saline, wound cleanser or as specified  Apply 4% Topical Lidocaine anesthetic solution PRN to wound/ulcer prior to debridement for pain control  Apply specified dressing to wound base/bed     To periwound apply: Vaseline  Secondary dressing apply: Gauze  Secure with: Kerlix  Dressing change frequency: Daily      Wound Goals  Wound Goals:   Healing Goals:   Fair healing potential secondary to moderate comorbid conditions  Wound depth will decrease by 25%   Patient will achieve full wound closure and return to full ADLs       Discussion/Summary    Today patient was seen and evaluated, the wounds were debrided and dressed with dermagran gauze DSD  We discussed glycemic control, protein intake and compliance with nonweightbearing area and patient is advised he may take a shower with the use of a cast cover and shower chair every 3-4 days  Today we discussed hyperbaric oxygen therapy, patient does have a compromised autologous flap  The flap has failed as evidenced by the necrotic wound edges, patient would benefit from hyperbaric oxygen and allow us to use a split thickness skin graft for the wound to then heal  Patient is to be set up for a dry run in the chamber and then an evaluation with Dr Olesya Warren  He is to follow-up with me in 2 weeks  Visiting nurses were faxed orders on dressing changes  The treatment plan was reviewed with the patient/guardian  The patient/guardian understands and agrees with the treatment plan     Call the [facility] for increased pain, redness, drainage, odor from or around your wound  If you have a compression wrap, check the circulation in your toes  If you expeience pain, swelling, a tingling sensation or a change in the color of your toes, elevate your legs  if your symptoms are not relieved with elevation, call the [facility]  Discharge Condition: Wound has not healed  Ambulatory Status: The patient is ambulatory without assistance  Discharge status: He will be discharged to home and was instructed to follow-up 2 weeks        Chief Complaint  Follow up for right TMA and Right heel      History of Present Illness    Wound Identification HPI   Wound #1: right TMA Wound #2: right heel          The patient came to Wound Care via wheelchair  The patient is being seen for a follow-up with MD at the [facility]  The patient is accompanied by his brother in law  The patient's identification was verified  A secondary verification process was completed  Orientation: oriented to person, oriented to place and oriented to time  Blood Glucose:  121 mg/dL as reported by patient  1/12/16: The patient is referred for Right TMA and Right heel  Patient was referred by Dr Susan Mondragon  Patient reports he had a right TMA around Dec 11, 2015  Patient developed a blood clot to his left leg while in the hospital and was started on heparin and discharged on Coumadin  Patient was discharged from [facility] 1/7/2016 and instructed to follow up at [facility]  The patient reports he does perineal dialysis at home nightly  The patient arrived with Santyl intact to heel wound and wet to dry gauze on right TMA - patient states that home care ordered the supplies, but they have not been received  Patient is currently followed by [facility]  1/19/2016: Arrived with Dermagran to right TMA site, 4x4, kerlix, tape to right TMA and right heel sites  History of Falling: No = 0   Secondary Diagnosis: Yes = 15   Ambulatory Aid None, Bedrest, Nurse Assist = 0   No = 0   Gait: Normal = 0   Mental Status: Oriented to own ability = 0   Total Score: 15   < 25 = Low Risk         The most recent fall occurred denies any fall history  Nutrition Assessment Screening: Food intake over the last 3 months due to the loss of appetite, digestive problems, chewing or swallowing difficulties is graded as: 2 = no decrease in food intake   Weight loss during the last 3 months: 0 = greater than 3 kg (6 6 lbs)   Mobility scored as: 2 = goes out  Psychological Stress and Acute Disease Scored as: 2 = The patient has not experienced psychological stress or acute disease in the last 3 months  Neuropsychological problems scored as: 2 = no psychological problems  Body Mass Index (BMI) scored as: 3 = BMI 23 or greater  Nutritional Assessment Screening Score: 8 - 11 points - At risk of malnutrition  Pain Assessment   the patient states they do not have pain  (on a scale of 0 to 10, the patient rates the pain at 0 )   Abuse And Domestic Violence Screen    Yes, the patient is safe at home  The patient states no one is hurting them  Depression And Suicide Screen  No, the patient has not had thoughts of hurting themself  No, the patient has not felt depressed in the past 7 days  Prefered Language is  [place]  Primary Language is  English  Readiness To Learn: Receptive  Barriers To Learning: none  Preferred Learning: demonstration   Education Completed: further treatment/follow-up and treatment/procedure   Teaching Method: verbal   Person Taught: patient   Evaluation Of Learning: verbalized/demonstrated understanding         Provider Wound Care HPI: Patient presents today for care of right transmetatarsal amputation site and right heel wound  Patient states he has gotten his bed side commode and increased his compliance and he has had visiting nurses come and change his dressing  He does not complain of any nausea vomiting fever or chills  He also states he would like to try a dry run in the HBO chamber to see if he can handle the claustrophobia       Review of Systems    Constitutional: no fever or chills, feels well, no tiredness, no recent weight loss or weight gain  ENT: no complaints of earache, no loss of hearing, no nosebleeds or nasal discharge, no sore throat or hoarseness  Cardiovascular: no complaints of slow or fast heart rate, no chest pain, no palpitations, no leg claudication or lower extremity edema  Respiratory: no complaints of shortness of breath, no wheezing or cough, no dyspnea on exertion, no orthopnea or PND     Gastrointestinal: no complaints of abdominal pain, no constipation, no nausea or vomiting, no diarrhea or bloody stools  Genitourinary: End-stage renal disease on dialysis  Musculoskeletal: no complaints of arthralgia, no myalgia, no joint swelling or stiffness, no limb pain or swelling  Integumentary: skin wound  Neurological: numbness  Psychiatric: no depression, no mood disorders, no anxiety  ROS reviewed  Active Problems    1  Allergic rhinitis (477 9) (J30 9)   2  Anemia (285 9) (D64 9)   3  Benign essential hypertension (401 1) (I10)   4  Cancer, colon (153 9) (C18 9)   5  Cardiomyopathy (425 4) (I42 9)   6  Closed Fracture Of The Left Tibial Spine (823 00)   7  Decubitus ulcer of heel, right, stage II (974 04,276 42) (Z49 564)   8  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 60) (E11 40)   9  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 60) (E11 40)   10  Diabetic neuropathy (250 60,357 2) (E11 40)   11  DM (diabetes mellitus), type 2 with renal complications (098 72) (B01 15)   12  Dyslipidemia (272 4) (E78 5)   13  End stage renal disease (585 6) (N18 6)   14  Esophageal reflux (530 81) (K21 9)   15  Gout (274 9) (M10 9)   16  Hypercalcemia (275 42) (E83 52)   17  Hyperphosphatemia (275 3) (E83 39)   18  Hypocalcemia (275 41) (E83 51)   19  Lower extremity deep venous thrombosis (453 40) (I82 409)   20  Lumbar radiculopathy (724 4) (M54 16)   21  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)   22  Peritoneal Dialysis   23  Proteinuria (791 0) (R80 9)   24  Status post transmetatarsal amputation of right foot (V49 73) (Z89 431)   25  Type 2 diabetes mellitus with foot ulcer (250 80,707 15) (E11 621,L97 509)   26  Type 2 diabetes mellitus, uncontrolled, with renal complications (816 91)    (E11 29,E11 65)    Past Medical History    1  History of ESRD on dialysis (585 6,V45 11) (N18 6,Z99 2)   2  History of blood clots (V12 51) (Z86 718)   3  History of diabetes mellitus (V12 29) (Z85 36)   4   History of hypotension (V12 59) (Z86 79)   5  History of kidney disease (V13 09) (G92 038)    The active problems and past medical history were reviewed and updated today  Surgical History    1  History of Arteriovenous Surgery Creation Of A-V Fistula   2  History of Colon Surgery   3  Peritoneal Dialysis   4  History of Surgery Foot Amputation Transmetatarsal    The surgical history was reviewed and updated today  Family History    1  Family history of Colon Cancer (V16 0)   2  Family history of Coronary Artery Disease (V17 49)    The family history was reviewed and updated today  Social History    · Denied: History of Alcohol Use (History)   · Daily Tea Consumption (___ Cups/Day)   · Never A Smoker   · No drug use  The social history was reviewed and updated today  The social history was reviewed and is unchanged  Current Meds   1  Accu-Chek Multiclix Lancets Miscellaneous; Drum, test blood sugar QID; Therapy: 91LBC5352 to (Last Rx:04Jun2015)  Requested for: 58BHO0996 Ordered   2  [employer]; Test 4 times daily, insulin requiring DM; Therapy: 01YIA4563 to (Last Rx:04Jun2015)  Requested for: 84YKW9716 Ordered   3  Chavo Microlet Lancets Miscellaneous; Test twice daily, 250; Therapy: 50SWP1255 to (Last Rx:59Hxy8632)  Requested for: 61BRH9675 Ordered   4  BD Pen Needle Janene U/F 32G X 4 MM Miscellaneous; as directed; Therapy: 09PGD9251 to (Last Rx:04Jun2015)  Requested for: 68EPT6634 Ordered   5  Coumadin 3 MG Oral Tablet; 1 daily except for 1/2 tablet on Monday and Thursday; Therapy: (Recorded:11Jan2016) to Recorded   6  Docusate Sodium 100 MG Oral Capsule; Therapy: (Recorded:08Jan2016) to Recorded   7  Fosrenol 1000 MG Oral Tablet Chewable; CHEW AND SWALLOW 1 TABLET 3 TIMES   DAILY WITH MEALS; Therapy: 43XWR0971 to (Evaluate:13Jun2015)  Requested for: 34PDB0584 Recorded   8  Gabapentin 100 MG Oral Capsule; TAKE 1 CAPSULE 3 times daily;    Therapy: 96IMQ2455 to (Evaluate:10Nov2015)  Requested for: 03YWC3764; Last   Rx:14May2015 Ordered   9  Gentamicin Sulfate (Topical) 0 1 % OINT; use as dir; Therapy: (Olaferd Cure) to Recorded   10  HydrALAZINE HCl - 25 MG Oral Tablet; TAKE 1 TABLET TWICE DAILY; Therapy: 62ZTV8538 to Recorded   11  Lantus SoloStar 100 UNIT/ML Subcutaneous Solution Pen-injector; 12 units daily; Therapy: (Recorded:11Jan2016) to Recorded   12  Lidocaine HCl (Local Anesth ) 4 % SOLN; Apply prn to wound(s) prior to debridement for    pain control; Therapy: (Recorded:12Jan2016) to Recorded   13  Midodrine HCl - 5 MG Oral Tablet; 1 twice daily; Therapy: (Olaferchandan Cure) to Recorded   14  Nephrocaps 1 MG Oral Capsule; TAKE 1 CAPSULE DAILY  Requested for: 21Ike3424;    Last Rx:85Mra6296 Ordered   15  NovoLOG FlexPen 100 UNIT/ML Subcutaneous Solution Pen-injector; use as directed    sliding scale prior to meals, 1-7 units with meals depending upon readings; Therapy: 75BNY8755 to (Last Rx:14May2015)  Requested for: 35BQX4303 Ordered   16  PriLOSEC 20 MG Oral Capsule Delayed Release; 1 every day; Therapy: (John Cure) to Recorded   17  ProAir  (90 Base) MCG/ACT Inhalation Aerosol Solution; USE 1 PUFF BY MOUTH    TWICE DAILY AS NEEDED FOR COPD/SHORTNESS OF BREATH; Therapy: 42TZR1630 to (Evaluate:18Feb2016)  Requested for: 68TIE1332; Last    Rx:20Nov2015 Ordered   18  Renvela 800 MG Oral Tablet; TAKE 4 TABLET 3 times daily; Therapy: 97Bkj7839 to Recorded   19  Santyl 250 UNIT/GM External Ointment; APPLY TO AFFECTED AREA(S) ONCE DAILY AS    DIRECTED; Therapy: (Recorded:08Jan2016) to Recorded   20  Sennosides 17 2 MG TABS; TAKE AS DIRECTED; Therapy: (Olaferd Cure) to Recorded   21  Warfarin Sodium 4 MG Oral Tablet; TAKE 1 TABLET DAILY; Therapy: 03JNX8565 to (Last Rx:15Jan2016)  Requested for: 10EMG2730 Ordered    The medication list was reviewed and updated today  Allergies    1  Dilaudid SOLN   2  Morphine Sulfate TABS    Vitals  Vital Signs [Data Includes: Current Encounter]    Recorded: 12ZGL6317 09:18AM   Temperature 98 3 F, Tympanic   Heart Rate 84, R Radial   Pulse Quality Regular, R Radial   Respiration 16   Respiration Quality Normal   Systolic 170, RUE, Sitting   Diastolic 82, RUE, Sitting   Height 6 ft 1 in   Weight 319 lb 0 7 oz   BMI Calculated 42 09   BSA Calculated 2 62   Pain Scale 0     Physical Exam    Pulse Exam   Posterior tibialis: right 2+ and left 2+  Dorsalis pedis: right 2+ and left 2+  Normal Capillary Refill  Extremities: No lower extremity edema  Wound #1 Assessment wound #1 Location:, Right TMA, started on 12/11/2015, Care for this wound started on 1/12/2016  Wound Status: not healed  Non Healing Surgical: Full Thickness  Length: 3 1cm x Width: 4 9cm x Depth: 0 4cm   Total: 15 19sq cm   Wound Volume: 6 076cm3           Tissue type: Granulation and Slough   Color of Wound: Red - 40% and Yellow - 60%   Exudate Amount: Moderate   Exudate Type: Serosangiunous   Odor: None   Exudate Color: Yellow   Wound Edges: Rolled (epibolized)   Periwound Skin Condition: Erythematous, Scaly, Edema, scattered scabs, dry   Comments: 2 areas measured with epithelization and scabs between open wounds  Physician/Provider Wound #1 Exam   I agree with the nursing assessment and documentation  This is non healing surgical wound from failed flap for TMA, the wound base is mixed fibro-granular, it is dry, it does not probe to muscle tendon or bone  There are no signs of infection  Wound #2 Assessment wound #2 Location:, right heel, started on 12/2015, Care for this wound started on 1/12/2016  Wound Status: not healed  PressureUlcer Grade: Stage II  Length: 3cm x Width: 3 1cm x Depth: 0 1cm   Total: 9 3sq cm   Wound Volume: 0 93cm3           Tissue type: Granulation and Slough   Color of Wound: Red - 20% and Yellow - 80%   Exudate Amount:  Moderate   Exudate Type: Serosangiunous Odor: None   Exudate Color: Yellow and pink   Periwound Skin Condition: Scaly        Physician/Provider Wound #2 Exam   I agree with the nursing assessment and documentation  This is a stage II heel pressure ulcer, there is mixed fibro-granular tissue, there are no sinus tracts wound does not probe to muscle tendon or bone  There are no signs of infection  Constitutional - General appearance: No acute distress, well appearing and well nourished  Pulmonary - Respiratory effort: No increased work of breathing or signs of respiratory distress  Cardiovascular - DP and PT are within normal limits  No lower extremity edema  Musculoskeletal - Nails are in good repair left foot  Muscle strength/tone: Normal    Neurologic - [place] 5 0 7 monofilament is absent bilateral feet  Psychiatric - Orientation to person, place, and time: Normal  Mood and affect: Normal       Trg Revolucijrobert 1: Wound Nursing Care Plan   Impaired Tissue Integrity related to: right TMA, right heel   Risk for Infection related to open wound:   Goals   Patient will achieve 100% epithelialization:  Patient will maintain skin integrity:  Patient will demonstrate and verbalize knowledge of their disease process and management:  Patient will verbalize knowledge of and demonstrate adherence to interventions to achieve optimal nutrition required for wound healing:  Wound Nursing Care Interventions:   Provide moist wound healing:  Implement offloading measures (turn & reposition schedule/method, ortho shoes/boots, floating heels, crutches, specialty surfaces:  Teach and evaluate effectiveness of offloading measures:  Teach patient and/or family about disease process and management methods:  Teach patient and/or family about infection control measures (gloving, hand sanitation, MDROs, disposal of soiled dressings, antibiotic therapy): Gina Russ    Evaluate effectiveness of all above measures every 4 weeks with Patient Specific CQI:    Other:  Care plan initiated 1/12/16      Procedure      Wound #1: right TMA     Nurse Dressing Change:   Wound #1 The wound located on the right TMA  Applied 4% topical Lidocaine solution to wound/ulcer prior to debridement for pain control  Wound care rendered as per Physician/Advanced Practitioner order/plan  Order sent to Home Care  Return to [address] [place] [address] 2 weeks  Comments:, The patient is now considering attempting HBOT - he requests a "dry run" to see if he can tolerate the confinement of the chamber  Excisional Debridement Subcutaneous Tissue:   Wound was excisionally debrided to subcutaneous tissue as follows  Prior to the procedure, the patient was identified using two identifiers, the general consent was signed, the proper site of procedure was identified, and a time out was taken  Anesthesia: Local anesthesia with 4% topical lidocaine was utilized prior to the procedure for pain control  #15 surgical blade was utilized to surgically excise devitalized tissue and/or slough through epidermis, dermis and into the subcutaneous tissue  The total sq cm excised was 15sq cm  There was minimal bleeding controlled with gentle pressure  the patient tolerated the procedure well without complication  CPT Code(s)   [id number] - Excisional DebridementTo Subcutaneous Tissue; first 20 sq cm  Wound #2: right heel     Nurse Dressing Change:   Wound #2 The wound located on the right heel  Applied 4% topical Lidocaine solution to wound/ulcer prior to debridement for pain control  Wound care rendered as per Physician/Advanced Practitioner order/plan  Order sent to Home Care  Return to [address] [place] [address] 2 weeks  Comments:    Excisional Debridement Subcutaneous Tissue:   Wound was excisionally debrided to subcutaneous tissue as follows     Prior to the procedure, the patient was identified using two identifiers, the general consent was signed, the proper site of procedure was identified, and a time out was taken  Anesthesia: Local anesthesia with 4% topical lidocaine was utilized prior to the procedure for pain control  #15 surgical blade was utilized to surgically excise devitalized tissue and/or slough through epidermis, dermis and into the subcutaneous tissue  The total sq cm excised was 9 3sq cm  There was moderate bleeding controlled with silver nitrate  the patient tolerated the procedure well without complication  CPT Code(s)   [id number] - Excisional DebridementTo Subcutaneous Tissue; first 20 sq cm        Signatures   Electronically signed by : Gildardo Falcon DPM; Jan 19 2016 10:14AM EST                       (Author)

## 2018-01-17 NOTE — MISCELLANEOUS
Message   Recorded as Task   Date: 03/15/2017 04:56 PM, Created By: Shannon Hill   Task Name: Call Back   Assigned To: Triston He   Regarding Patient: Kevin Medina, Status: Active   Comment:    Shannon Hill - 15 Mar 2017 4:56 PM     TASK CREATED  Caller: [facility], Other; Results Inquiry  Radiology called with results of pt's CT scan  results in chart as well     Triston He - 15 Mar 2017 5:22 PM     TASK EDITED  s/w pt  since a lot of pain at times and multiple med problems, ER eval suggested        Signatures   Electronically signed by : Napoleon Villela DO; Mar 15 2017  5:22PM EST                       (Author)

## 2018-01-17 NOTE — RESULT NOTES
Verified Results  Coumadin Flow Sheet [id number] 01:50PM [facility]     Test Name Result Flag Reference   Diagnosis DVT     Managing Provider INR Goal Range 2-3     INR 2 9     Current Dose      Hold Th,F, then 2mg Sat/Sun sp/cma

## 2018-01-17 NOTE — RESULT NOTES
Verified Results  Coumadin Flow Sheet [id number] 02:09PM [facility]     Test Name Result Flag Reference   Diagnosis DVT     Managing Provider INR Goal Range 2-3     INR 5 3     Current Dose 3 mg daily     Comments      Verbal received from INR company

## 2018-01-17 NOTE — PROCEDURES
Procedures by Terrence Poe DO at 8/3/2017   7:25 PM      Author:  Terrence Poe DO Service:  Critical Care/ICU Author Type:  Resident    Filed:  8/3/2017  7:26 PM Date of Service:  8/3/2017  7:25 PM Status:  Attested    :  Terrence Poe DO (Resident)  Cosigner:  Joshua Renteria MD at 8/3/2017  8:02 PM      Procedure Orders:       1  CENTRAL LINE [[id number]] ordered by Terrence Poe DO at 08/03/17 1925                 Post-procedure Diagnoses:       1  End-stage renal disease on peritoneal dialysis [N18 6, Z99 2]       2  Ischemic cardiomyopathy [I25 5]       3  Chronic diastolic heart failure [Q65 69]              Attestation signed by Joshua Renteria MD at 8/3/2017  8:02 PM      I was present for and participated in procedure  Pt merle procedure well  Central Line  Insertion  Date/Time: 8/3/2017 7:25 PM  Performed by: Tera Staples  Authorized by: Tera Staples     Patient location:  Bedside  Consent:     Consent obtained:  Verbal    Consent given by:  Patient  Universal protocol:     Patient identity confirmed:  Verbally with patient  Pre-procedure details:     Hand hygiene: Hand hygiene performed prior to insertion      Sterile barrier technique: All elements of maximal sterile technique followed      Skin preparation:  ChloraPrep    Skin preparation agent: Skin preparation agent completely dried prior to procedure    Indications:     Central line indications: hemodynamic monitoring, vascular access and treatment therapy      Site selection rationale:  Left IJ, patient has a Right dialysis cath   Anesthesia (see MAR for exact dosages):      Anesthesia method:  Local infiltration    Local anesthetic:  Lidocaine 1% WITH epi  Procedure details:     Location:  Left internal jugular    Vessel type: vein      Laterality:  Left    Approach: percutaneous technique used      Patient position:  Flat    Catheter type:  Triple lumen 20cm    Catheter size:  7 Fr    Landmarks identified: yes Ultrasound guidance: yes      Sterile ultrasound techniques: Sterile gel and sterile probe covers were used      Number of attempts:  1  Post-procedure details:     Post-procedure:  Dressing applied and line sutured    Assessment:  Blood return through all ports    Patient tolerance of procedure:   Tolerated well, no immediate complications                     Received for:Provider  EPIC   Aug  3 2017  8:02PM [facility] Standard Time

## 2018-01-17 NOTE — RESULT NOTES
Verified Results  Coumadin Flow Sheet [id number] 12:00AM [place]     Test Name Result Flag Reference   Diagnosis DVT     Managing Provider INR Goal Range 2-3     INR 2 9     Current Dose 4 mg daily     Patient Notified      result called in by Nurse through [address] [place] [address] visiting nurses [facility], [place], operated by [facility] [phone]

## 2018-01-18 NOTE — MISCELLANEOUS
Physical Exam    Pulse Exam   Posterior tibialis: right 2+ and left 2+  Dorsalis pedis: right 2+ and left 2+  Normal Capillary Refill  Extremities: No lower extremity edema  Wound #1 Assessment wound #1 Location:, Right TMA, started on 12/11/2015, Care for this wound started on 1/12/2016  Wound Status: not healed  Length: 4 2cm x Width: 2 7cm x Depth: 0 7cm   Total: 11 34sq cm   Wound Volume: 7 938cm3           Tissue type: Granulation and Slough   Color of Wound: Red - 20% and Yellow - 80%   Exudate Amount: Moderate   Exudate Type: Serosangiunous   Odor: None   Exudate Color: Yellow   Wound Edges: Intact   Periwound Skin Condition: Erythematous, Scaly, Edema, scattered scabs, dry         Physician/Provider Wound #1 Exam   I agree with the nursing assessment and documentation  This is a West grade 2 diabetic foot wound, the wound base is mixed fibro-granular, it is dry, it does not probe to muscle tendon or bone  There are no signs of infection  Wound #2 Assessment wound #2 Location:, right heel, started on 12/2015, Care for this wound started on 1/12/2016  Wound Status: not healed  PressureUlcer Grade: Stage II  Length: 3 5cm x Width: 2 8cm x Depth: 0 1cm   Total: 9 8sq cm   Wound Volume: 0 98cm3           Tissue type: Granulation and Slough   Color of Wound: Red - 40% and Yellow - 60%   Exudate Amount: Moderate   Exudate Type: Serosangiunous   Odor: None   Exudate Color: Yellow and pink   Wound Edges: Callous   Periwound Skin Condition: Callus        Physician/Provider Wound #2 Exam   I agree with the nursing assessment and documentation  This is a stage II heel pressure ulcer, there is mixed fibro-granular tissue, there are no sinus tracts wound does not probe to muscle tendon or bone  There are no signs of infection  Constitutional - General appearance: No acute distress, well appearing and well nourished     Pulmonary - Respiratory effort: No increased work of breathing or signs of respiratory distress  Cardiovascular - DP and PT are within normal limits  No lower extremity edema  Musculoskeletal - Nails are in good repair left foot  Muscle strength/tone: Normal    Neurologic - Empire Daxa 5 0 7 monofilament is absent bilateral feet  Psychiatric - Orientation to person, place, and time: Normal  Mood and affect: Normal       Wound Drsg  Orders/Instructions  Wound Identification Dressing Orders--Instructions:   Wound Identification and Instructions   Wound #1: right TMA    Wound Care Instructions  Discussed with Patient/Caregiver  Dressing Type: Sveta Milch with mild soap and water, normal saline, wound cleanser or as specified  Apply 4% Topical Lidocaine anesthetic solution PRN to wound/ulcer prior to debridement for pain control  Apply specified dressing to wound base/bed  Secondary dressing apply: Gauze, and ABD  Secure with: Kerlix  Dressing change frequency: Three times per week   Wound #2: right heel    Wound Care Instructions  Discussed with Patient/Caregiver  Dressing Type: Collagenase (Santyl)  Wash with mild soap and water, normal saline, wound cleanser or as specified  Apply 4% Topical Lidocaine anesthetic solution PRN to wound/ulcer prior to debridement for pain control  Apply specified dressing to wound base/bed  To periwound apply: Vaseline  Secondary dressing apply: Gauze  Secure with: Kerlix  Dressing change frequency: Daily      Wound Goals  Wound Goals:   Healing Goals:   Fair healing potential secondary to moderate comorbid conditions  Wound depth will decrease by 25%   Patient will achieve full wound closure and return to full ADLs       Future Appointments    Date/Time Provider Specialty Site   01/19/2016 09:00 AM Bull Altman DPM Wound Care [place] [id number]     Neptali Liz 1:    Wound Nursing Care Plan   Impaired Tissue Integrity related to: right TMA, right heel   Risk for Infection related to open wound:   Goals   Patient will achieve 100% epithelialization:  Patient will maintain skin integrity:  Patient will demonstrate and verbalize knowledge of their disease process and management:  Patient will verbalize knowledge of and demonstrate adherence to interventions to achieve optimal nutrition required for wound healing:  Wound Nursing Care Interventions:   Provide moist wound healing:  Implement offloading measures (turn & reposition schedule/method, ortho shoes/boots, floating heels, crutches, specialty surfaces:  Teach and evaluate effectiveness of offloading measures:  Teach patient and/or family about disease process and management methods:  Teach patient and/or family about infection control measures (gloving, hand sanitation, MDROs, disposal of soiled dressings, antibiotic therapy): Jermaine Ignacio    Evaluate effectiveness of all above measures every 4 weeks with Patient Specific CQI:    Other:  Care plan initiated 1/12/16      Signatures   Electronically signed by : Temitope Mondragon DPM; Jan 12 2016 10:25AM EST                       (Author)    Electronically signed by : Temitope Mondragon DPM; Jan 13 2016  1:31PM EST                       (Author)

## 2018-01-18 NOTE — RESULT NOTES
Verified Results  Coumadin Flow Sheet [id number] 12:00AM [place]     Test Name Result Flag Reference   Diagnosis DVT     Managing Provider INR Goal Range 2-3     INR 2 3     Current Dose 3mg daily  sp/cma

## 2018-01-22 VITALS
TEMPERATURE: 96.3 F | HEIGHT: 73 IN | OXYGEN SATURATION: 96 % | HEART RATE: 68 BPM | SYSTOLIC BLOOD PRESSURE: 106 MMHG | DIASTOLIC BLOOD PRESSURE: 60 MMHG | WEIGHT: 315 LBS | BODY MASS INDEX: 41.75 KG/M2 | RESPIRATION RATE: 18 BRPM

## 2018-01-24 NOTE — MISCELLANEOUS
Assessment   1  CAD (coronary artery disease) (414 00) (I25 10)1   2  Orthostatic hypotension (458 0) (I95 1)1   3  NSTEMI (non-ST elevated myocardial infarction) (410 70) (I21 4)1   4  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 60) (E11 49)1      1 Amended By: Savana Wu ; Jul 13 2017 7:49 PM EST    Plan  Dizziness    · EKG/ECG- POC; Status:Complete;   Done: 64DMH2498 21:01CT7   Performed: In Office; Due:28Jzo5896; Ordered; For:Dizziness; Ordered By:Prem Serna   L1      1 Amended By: Savana Wu ; Jul 13 2017 7:47 PM EST    Discussion/Summary  Discussion Summary:   Suspect orthostatic hypotension as cause for his dizziness  He may need to restart midodrine but I would wait for cardiac evaluation given recent STEMI  Denies further cardiac symptoms otherwise  Reviewed DM labwork and DM is well controlled  Continue podiatry and wound care for foot ulcer  Follow up 1-2 months  1        1 Amended By: Savana Wu ; Jul 13 2017 7:47 PM EST    Chief Complaint  Chief Complaint Free Text Note Form: pt present for TCm  af/rma   1  Discharged from [facility] in [place] F/U cardio and renal  Medications reviewed  1        1 Amended By: Gaston Cifuentes; Jul 13 2017 3:48 PM EST    History of Present Illness  TCM Communication [facility]: The patient is being contacted for follow-up after hospitalization and [facility]  Hospital records were reviewed  He was hospitalized at [facility]  The dates of hospitalization:, date of admission: 06/29/17, date of discharge: 07/07/17  Diagnosis: M I, Low BP, Chest pain , Lighheadedness  He was discharged to home  Medications reviewed and updated today  He scheduled a follow up appointment  Follow-up appointments with other specialists: F/U Dr Juarez Nail and Renal    Symptoms: weakness and fatigue, but no anorexia, no nausea, no vomiting and no wound drainage  The patient is currently asymptomatic   States blood sugars are within range Counseling was provided to the patient  Topics counseled included patient and family education and importance of compliance with treatment  Communication performed and completed by Daisy Sandoval LPN   HPI: As per TCM  Woke with chest pain the middle of the night and went to the ER by estefaniad  Diagnosed with inferolateral NSTEMI  Had cath with stenting of LAD and RCA  Feels much better but continues to have intermittent dizzy episodes  Will get tunnel vision  graying out, feels lightheaded, will only occur after standing for a while  Previously on midodrine but this has been stopped  Denies further chest pain or dyspnea  Has upcoming appointment with cardiologist  Would like to start cardiac rehab but will wait for that appointment  1        1 Amended By: Bella Cardoso ; Jul 13 2017 7:42 PM EST    Review of Systems  Complete-Male:   Constitutional:1  No fever or chills, feels well, no tiredness, no recent weight gain or weight loss1   Cardiovascular: 1  No complaints of slow heart rate, no fast heart rate, no chest pain, no palpitations, no leg claudication, no lower extremity1   Respiratory:1  No complaints of shortness of breath, no wheezing, no cough, no SOB on exertion, no orthopnea or PND1   Neurological:1  dizziness1         1 Amended By: Bella Cardoso ; Jul 13 2017 7:45 PM EST    Active Problems   1  Abdominal pain, epigastric (789 06) (R10 13)  2  Adult BMI 45 0-49 9 kg/sq m (V85 42) (Z68 42)  3  Allergic rhinitis (477 9) (J30 9)  4  Anemia (285 9) (D64 9)  5  Benign essential hypertension (401 1) (I10)  6  Cancer, colon (153 9) (C18 9)  7  Cardiomyopathy (425 4) (I42 9)  8  Chronic foot ulcer, limited to breakdown of skin, right (707 15) (L97 511)  9  Chronic foot ulcer, right, with fat layer exposed (707 15) (L97 512)  10  Closed Fracture Of The Left Tibial Spine (823 00)  11  Controlled diabetes mellitus with foot ulcer (250 80,707 15) (E11 621,L97 509)  12   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 60) (E11 49)  13  Diabetic neuropathy (250 60,357 2) (E11 40)  14  DM (diabetes mellitus), type 2 with renal complications (503 65) (C45 45)  15  Dyslipidemia (272 4) (E78 5)  16  End stage renal disease (585 6) (N18 6)  17  Esophageal reflux (530 81) (K21 9)  18  Gout (274 9) (M10 9)  19  Hypercalcemia (275 42) (E83 52)  20  Hyperphosphatemia (275 3) (E83 39)  21  Hypocalcemia (275 41) (E83 51)  22  Hypothyroidism (244 9) (E03 9)  23  Joint pain (719 40) (M25 50)  24  Lower extremity deep venous thrombosis (453 40) (I82 409)  25  Lumbar radiculopathy (724 4) (M54 16)  26  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)  27  CHRISTOS (obstructive sleep apnea) (327 23) (G47 33)  28  Peritoneal Dialysis  29  Proteinuria (791 0) (R80 9)  30  Stage II pressure ulcer of right heel (042 86,943 76) (L89 612)  31  Status post transmetatarsal amputation of right foot (V49 73) (B10 942)    Past Medical History   1  History of Abscess of leg, right (682 6) (L02 415)  2  History of ESRD on dialysis (585 6,V45 11) (N18 6,Z99 2)  3  History of blood clots (V12 51) (Z86 718)  4  History of diabetes mellitus (V12 29) (Z86 39)  5  History of hypotension (V12 59) (Z86 79)  6  History of kidney disease (V13 09) (M62 153)    Surgical History   1  History of Arteriovenous Surgery Creation Of A-V Fistula  2  History of Colon Surgery  3  Peritoneal Dialysis  4  History of Surgery Foot Amputation Transmetatarsal  Surgical History Reviewed: The surgical history was reviewed and updated today  1        1 Amended By: Rolly Valles ; Jul 13 2017 7:45 PM EST    Family History  Family History   1  Family history of Colon Cancer (V16 0)  2  Family history of Coronary Artery Disease (V17 49)  Family History Reviewed: The family history was reviewed and updated today   1        1 Amended By: Rolly Valles ; Jul 13 2017 7:45 PM EST    Social History    · Denied: History of Alcohol Use (History)   · Daily Tea Consumption (___ Cups/Day)   · Former smoker (T70 48) (C12 378)   ·    · Never A Smoker   · No drug use    Social History Reviewed: The social history was reviewed and updated today  1  The social history was reviewed and is unchanged  1        1 Amended By: Corrinne Doheny ; Jul 13 2017 7:45 PM EST    Current Meds  1  Accu-Chek Multiclix Lancets Miscellaneous; testing five times a day; Therapy: 59AJA9535 to (Last Rx:59Huv3354)  Requested for: 20KHR0129 Ordered  2  Albuterol Sulfate NEBU; Therapy: (Recorded:28Cjy4577) to Recorded  3  Aspirin EC 81 MG Oral Tablet Delayed Release; Take 1 tablet daily; Therapy: 07TTY7316 to Recorded  4  [employer]; Test 5 times daily, insulin requiring DM; Therapy: 42RVE7969 to (Last Rx:18May2017)  Requested for: 64XGW1875 Ordered  5  Chavo Microlet Lancets Miscellaneous; Test twice daily, 250; Therapy: 96RLG4771 to (Last Rx:37Zev6724)  Requested for: 05YVV0249 Ordered  6  BD Pen Needle Janene U/F 32G X 4 MM Miscellaneous; as directed; Therapy: 91QFK8389 to (Last Rx:56Mlm6344)  Requested for: 82FSV6020 Ordered  7  Fosrenol 1000 MG Oral Tablet Chewable; CHEW AND SWALLOW 1 TABLET 3 TIMES   DAILY WITH MEALS; Therapy: 88WIF3813 to (Evaluate:13Jun2015)  Requested for: 00DKB0216 Recorded  8  Gabapentin 100 MG Oral Capsule; TAKE 1 CAPSULE TWICE DAILY; Therapy: 80TDP6047 to (Silvestre Guevara)  Requested for: 05AQQ7111 Recorded  9  Gentamicin Sulfate (Topical) 0 1 % OINT; use as dir; Therapy: (0660 303 88 06) to Recorded  10  Lantus SoloStar 100 UNIT/ML Subcutaneous Solution Pen-injector; 12 units daily     Requested for: 62HFL7143; Last Rx:18May2017 Ordered  11  Levothyroxine Sodium 50 MCG Oral Tablet; Take 1 tablet daily; Therapy: 96ZUS5046 to (Evaluate:10Jun2018)  Requested for: 80OGS4440; Last    Rx:15Jun2017 Ordered  12   Nitroglycerin 0 4 MG Sublingual Tablet Sublingual; PLACE 1 TABLET UNDER THE    TONGUE EVERY 5 MINUTES FOR UP TO 3 DOSES AS NEEDED FOR CHEST    PAIN  CALL 911 IF PAIN PERSISTS; Therapy: 19EAC0409 to Recorded  13  NovoLOG FlexPen 100 UNIT/ML Subcutaneous Solution Pen-injector; use as directed    sliding scale prior to meals, 1-7 units with meals depending upon readings; Therapy: 48OWJ9786 to (Last Rx:67Bqx4203)  Requested for: 79Dgt4516 Ordered  14  Pantoprazole Sodium 40 MG Oral Tablet Delayed Release; Take 1 tablet daily; Therapy: 09AUK5941 to (Rodrigo Coronado)  Requested for: 51OQC4875; Last    Rx:13Mar2017 Ordered  15  Pravachol 80 MG Oral Tablet; Therapy: (Recorded:41Fph5916) to Recorded  16  ProAir  (90 Base) MCG/ACT Inhalation Aerosol Solution; USE 1 PUFF BY MOUTH    TWICE DAILY AS NEEDED FOR COPD/SHORTNESS OF BREATH; Therapy: 99TNN8747 to (Evaluate:18Feb2016)  Requested for: 86WMS2888; Last    Rx:05Jge5691 Ordered  17  Ranexa 500 MG Oral Tablet Extended Release 12 Hour; TAKE 1 TABLET EVERY 12    HOURS; Therapy: (Recorded:38Kim0254) to Recorded  18  Renvela 800 MG Oral Tablet; TAKE 4 TABLET 3 times daily; Therapy: 86Xub9568 to Recorded  19  Ticagrelor 90 MG TABS; TAKE 1 TABLET TWICE DAILY; Therapy: (Recorded:29Tsu3396) to Recorded  20  Uloric 40 MG Oral Tablet; take 1 tablet by mouth every day; Therapy: 09UYH2478 to (Adarsh Koo)  Requested for: 44GZY4346; Last    Rx:61Qsw5635 Ordered  21  Valsartan 40 MG Oral Tablet; TAKE 1 TABLET DAILY; Therapy: (Recorded:82Jpn6744) to Recorded  Medication List Reviewed: The medication list was reviewed and updated today  1        1 Amended By: Temitope Pereyra ; Jul 13 2017 7:45 PM EST    Allergies   1  Dilaudid SOLN  2   Morphine Sulfate TABS    Vitals  Signs   Recorded: 80MTO1210 54:37JD    Systolic: 212, Standing 1    Diastolic: 60, Standing 1    BP Comments: recheck 1    Systolic: 682, Supine 1    Diastolic: 70, Supine 1   Recorded: 68SSY3696 03:26PM    Temperature: 96 3 F 1    Heart Rate: 68 1    Respiration: 18 1    Systolic: 352 1 Diastolic: 60 1    Height: 6 ft 1 in 1    Weight: 332 lb  1    BMI Calculated: 43 8 1    BSA Calculated: 2 67 1    O2 Saturation: 96 1      1 Amended By: Courtney Bolanos ; Jul 13 2017 7:43 PM EST    Physical Exam    Constitutional1    General appearance: Abnormal  1  obese  Ears, Nose, Mouth, and Throat1    Oropharynx: Normal with no erythema, edema, exudate or lesions  1    Pulmonary1    Respiratory effort: No increased work of breathing or signs of respiratory distress  1    Auscultation of lungs: Clear to auscultation, equal breath sounds bilaterally, no wheezes, no rales, no rhonci  1    Cardiovascular1    Auscultation of heart: Normal rate and rhythm, normal S1 and S2, without murmurs  1    Examination of extremities for edema and/or varicosities: Abnormal  1  bilateral pretibial 2+ pitting edema  Carotid pulses: Normal 1    Abdomen1    Abdomen: Non-tender, no masses  1    Lymphatic1    Palpation of lymph nodes in neck: No lymphadenopathy  1    Skin         1 Amended By: Courtney Bolanos ; Jul 13 2017 7:46 PM EST    Future Appointments    Date/Time Provider Specialty Site   07/18/2017 03:00 PM Yani Montana, [facility]   07/12/2017 03:15 PM TRUDI Boudreaux   [address] [place] [address]     Signatures   Electronically signed by : Angel Orosco, ; Jul 10 2017  2:06PM EST                       (Co-author)    Electronically signed by : TRUDI Justice ; Jul 10 2017  3:06PM EST                       (Author)    Electronically signed by : TRUDI Justice ; Jul 13 2017  7:50PM EST                       (Author)

## 2022-08-31 ENCOUNTER — TELEPHONE (OUTPATIENT)
Dept: FAMILY MEDICINE CLINIC | Facility: CLINIC | Age: 62
End: 2022-08-31

## (undated) DEVICE — NEEDLE 25G X 1 1/2

## (undated) DEVICE — ABDOMINAL PAD: Brand: DERMACEA

## (undated) DEVICE — SCD SEQUENTIAL COMPRESSION COMFORT SLEEVE MEDIUM KNEE LENGTH: Brand: KENDALL SCD

## (undated) DEVICE — CHLORAPREP HI-LITE 26ML ORANGE

## (undated) DEVICE — INTENDED FOR TISSUE SEPARATION, AND OTHER PROCEDURES THAT REQUIRE A SHARP SURGICAL BLADE TO PUNCTURE OR CUT.: Brand: BARD-PARKER ® CARBON RIB-BACK BLADES

## (undated) DEVICE — GLOVE INDICATOR PI UNDERGLOVE SZ 8.5 BLUE

## (undated) DEVICE — ASTOUND IMPERVIOUS SURGICAL GOWN: Brand: CONVERTORS

## (undated) DEVICE — SYRINGE 10ML LL CONTROL TOP

## (undated) DEVICE — GROUNDING PAD UNIVERSAL SLW

## (undated) DEVICE — BASIC DOUBLE BASIN 2-LF: Brand: MEDLINE INDUSTRIES, INC.

## (undated) DEVICE — PACK GENERAL LF

## (undated) DEVICE — GLOVE SRG BIOGEL 8

## (undated) DEVICE — CURITY IDOFORM PACKING STRIP: Brand: CURITY

## (undated) DEVICE — LABEL STERILE (RSC) (2-SENSOR CAINE 2-LIDOCAINE 2-HEPARIN)

## (undated) DEVICE — SPONGE GAUZE 4 X 8 12 PLY STRL LF